# Patient Record
Sex: FEMALE | Race: WHITE | NOT HISPANIC OR LATINO | Employment: OTHER | URBAN - METROPOLITAN AREA
[De-identification: names, ages, dates, MRNs, and addresses within clinical notes are randomized per-mention and may not be internally consistent; named-entity substitution may affect disease eponyms.]

---

## 2017-08-02 ENCOUNTER — APPOINTMENT (EMERGENCY)
Dept: NON INVASIVE DIAGNOSTICS | Facility: HOSPITAL | Age: 72
DRG: 085 | End: 2017-08-02
Payer: COMMERCIAL

## 2017-08-02 ENCOUNTER — APPOINTMENT (EMERGENCY)
Dept: RADIOLOGY | Facility: HOSPITAL | Age: 72
DRG: 085 | End: 2017-08-02
Payer: COMMERCIAL

## 2017-08-02 ENCOUNTER — HOSPITAL ENCOUNTER (EMERGENCY)
Facility: HOSPITAL | Age: 72
DRG: 064 | End: 2017-08-02
Payer: COMMERCIAL

## 2017-08-02 ENCOUNTER — HOSPITAL ENCOUNTER (EMERGENCY)
Facility: HOSPITAL | Age: 72
Discharge: LEFT BEFORE DISCHARGE | DRG: 085 | End: 2017-08-02
Payer: COMMERCIAL

## 2017-08-02 ENCOUNTER — APPOINTMENT (EMERGENCY)
Dept: RADIOLOGY | Facility: HOSPITAL | Age: 72
DRG: 064 | End: 2017-08-02
Payer: COMMERCIAL

## 2017-08-02 VITALS
RESPIRATION RATE: 18 BRPM | OXYGEN SATURATION: 96 % | DIASTOLIC BLOOD PRESSURE: 75 MMHG | WEIGHT: 140 LBS | HEART RATE: 75 BPM | SYSTOLIC BLOOD PRESSURE: 140 MMHG | BODY MASS INDEX: 24.03 KG/M2 | TEMPERATURE: 98.2 F

## 2017-08-02 VITALS
BODY MASS INDEX: 23.9 KG/M2 | DIASTOLIC BLOOD PRESSURE: 78 MMHG | RESPIRATION RATE: 21 BRPM | WEIGHT: 140 LBS | HEART RATE: 87 BPM | SYSTOLIC BLOOD PRESSURE: 135 MMHG | TEMPERATURE: 98 F | HEIGHT: 64 IN | OXYGEN SATURATION: 99 %

## 2017-08-02 DIAGNOSIS — S14.109A INJURY OF CERVICAL SPINE, INITIAL ENCOUNTER (HCC): ICD-10-CM
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
## 2017-08-02 DIAGNOSIS — R53.1 LEFT-SIDED WEAKNESS: Primary | ICD-10-CM

## 2017-08-02 DIAGNOSIS — R29.90 STROKE-LIKE SYMPTOMS: Primary | ICD-10-CM

## 2017-08-02 DIAGNOSIS — S09.90XA HEAD INJURY, INITIAL ENCOUNTER: ICD-10-CM

## 2017-08-02 LAB
ANION GAP BLD CALC-SCNC: 15 MMOL/L (ref 4–13)
ANION GAP SERPL CALCULATED.3IONS-SCNC: 11 MMOL/L (ref 4–13)
APTT PPP: 28 SECONDS (ref 23–35)
BASE EXCESS BLDA CALC-SCNC: 2 MMOL/L (ref -2–3)
BUN BLD-MCNC: 10 MG/DL (ref 5–25)
BUN SERPL-MCNC: 12 MG/DL (ref 5–25)
CA-I BLD-SCNC: 1.14 MMOL/L (ref 1.12–1.32)
CA-I BLD-SCNC: 1.21 MMOL/L (ref 1.12–1.32)
CALCIUM SERPL-MCNC: 9.3 MG/DL (ref 8.3–10.1)
CHLORIDE BLD-SCNC: 102 MMOL/L (ref 100–108)
CHLORIDE SERPL-SCNC: 103 MMOL/L (ref 100–108)
CO2 SERPL-SCNC: 25 MMOL/L (ref 21–32)
CREAT BLD-MCNC: 0.5 MG/DL (ref 0.6–1.3)
CREAT SERPL-MCNC: 0.64 MG/DL (ref 0.6–1.3)
ERYTHROCYTE [DISTWIDTH] IN BLOOD BY AUTOMATED COUNT: 14 % (ref 11.6–15.1)
GFR SERPL CREATININE-BSD FRML MDRD: 89 ML/MIN/1.73SQ M
GFR SERPL CREATININE-BSD FRML MDRD: 96 ML/MIN/1.73SQ M
GLUCOSE SERPL-MCNC: 91 MG/DL (ref 65–140)
GLUCOSE SERPL-MCNC: 94 MG/DL (ref 65–140)
GLUCOSE SERPL-MCNC: 97 MG/DL (ref 65–140)
HCO3 BLDA-SCNC: 25.2 MMOL/L (ref 24–30)
HCT VFR BLD AUTO: 41.5 % (ref 34.8–46.1)
HCT VFR BLD CALC: 40 % (ref 34.8–46.1)
HCT VFR BLD CALC: 42 % (ref 34.8–46.1)
HGB BLD-MCNC: 14 G/DL (ref 11.5–15.4)
HGB BLDA-MCNC: 13.6 G/DL (ref 11.5–15.4)
HGB BLDA-MCNC: 14.3 G/DL (ref 11.5–15.4)
HOLD SPECIMEN: NORMAL
INR PPP: 0.97 (ref 0.86–1.16)
MCH RBC QN AUTO: 30.4 PG (ref 26.8–34.3)
MCHC RBC AUTO-ENTMCNC: 33.7 G/DL (ref 31.4–37.4)
MCV RBC AUTO: 90 FL (ref 82–98)
PCO2 BLD: 25 MMOL/L (ref 21–32)
PCO2 BLD: 26 MMOL/L (ref 21–32)
PCO2 BLD: 35.4 MM HG (ref 42–50)
PH BLD: 7.46 [PH] (ref 7.3–7.4)
PLATELET # BLD AUTO: 273 THOUSANDS/UL (ref 149–390)
PMV BLD AUTO: 9.1 FL (ref 8.9–12.7)
PO2 BLD: 50 MM HG (ref 35–45)
POTASSIUM BLD-SCNC: 3.5 MMOL/L (ref 3.5–5.3)
POTASSIUM BLD-SCNC: 3.5 MMOL/L (ref 3.5–5.3)
POTASSIUM SERPL-SCNC: 3.6 MMOL/L (ref 3.5–5.3)
PROTHROMBIN TIME: 12.7 SECONDS (ref 12.1–14.4)
RBC # BLD AUTO: 4.6 MILLION/UL (ref 3.81–5.12)
SAO2 % BLD FROM PO2: 87 % (ref 95–98)
SODIUM BLD-SCNC: 135 MMOL/L (ref 136–145)
SODIUM BLD-SCNC: 137 MMOL/L (ref 136–145)
SODIUM SERPL-SCNC: 139 MMOL/L (ref 136–145)
SPECIMEN SOURCE: ABNORMAL
SPECIMEN SOURCE: ABNORMAL
TROPONIN I SERPL-MCNC: 0.16 NG/ML
WBC # BLD AUTO: 8.77 THOUSAND/UL (ref 4.31–10.16)

## 2017-08-02 PROCEDURE — 72128 CT CHEST SPINE W/O DYE: CPT

## 2017-08-02 PROCEDURE — 86850 RBC ANTIBODY SCREEN: CPT | Performed by: EMERGENCY MEDICINE

## 2017-08-02 PROCEDURE — 82330 ASSAY OF CALCIUM: CPT

## 2017-08-02 PROCEDURE — 84484 ASSAY OF TROPONIN QUANT: CPT | Performed by: EMERGENCY MEDICINE

## 2017-08-02 PROCEDURE — 80047 BASIC METABLC PNL IONIZED CA: CPT

## 2017-08-02 PROCEDURE — 93005 ELECTROCARDIOGRAM TRACING: CPT | Performed by: EMERGENCY MEDICINE

## 2017-08-02 PROCEDURE — 84295 ASSAY OF SERUM SODIUM: CPT

## 2017-08-02 PROCEDURE — 82947 ASSAY GLUCOSE BLOOD QUANT: CPT

## 2017-08-02 PROCEDURE — 85610 PROTHROMBIN TIME: CPT | Performed by: EMERGENCY MEDICINE

## 2017-08-02 PROCEDURE — 86901 BLOOD TYPING SEROLOGIC RH(D): CPT | Performed by: EMERGENCY MEDICINE

## 2017-08-02 PROCEDURE — 99285 EMERGENCY DEPT VISIT HI MDM: CPT

## 2017-08-02 PROCEDURE — 93005 ELECTROCARDIOGRAM TRACING: CPT | Performed by: PHYSICIAN ASSISTANT

## 2017-08-02 PROCEDURE — 80048 BASIC METABOLIC PNL TOTAL CA: CPT | Performed by: EMERGENCY MEDICINE

## 2017-08-02 PROCEDURE — 82803 BLOOD GASES ANY COMBINATION: CPT

## 2017-08-02 PROCEDURE — 71010 HB CHEST X-RAY 1 VIEW FRONTAL (PORTABLE): CPT

## 2017-08-02 PROCEDURE — 36415 COLL VENOUS BLD VENIPUNCTURE: CPT | Performed by: EMERGENCY MEDICINE

## 2017-08-02 PROCEDURE — 85014 HEMATOCRIT: CPT

## 2017-08-02 PROCEDURE — 72131 CT LUMBAR SPINE W/O DYE: CPT

## 2017-08-02 PROCEDURE — 85027 COMPLETE CBC AUTOMATED: CPT | Performed by: EMERGENCY MEDICINE

## 2017-08-02 PROCEDURE — 85730 THROMBOPLASTIN TIME PARTIAL: CPT | Performed by: EMERGENCY MEDICINE

## 2017-08-02 PROCEDURE — 70450 CT HEAD/BRAIN W/O DYE: CPT

## 2017-08-02 PROCEDURE — 72125 CT NECK SPINE W/O DYE: CPT

## 2017-08-02 PROCEDURE — 84132 ASSAY OF SERUM POTASSIUM: CPT

## 2017-08-02 PROCEDURE — 86900 BLOOD TYPING SEROLOGIC ABO: CPT | Performed by: EMERGENCY MEDICINE

## 2017-08-02 RX ORDER — BACLOFEN 20 MG
TABLET ORAL
Status: ON HOLD | COMMUNITY
Start: 2015-07-06 | End: 2017-08-04 | Stop reason: CLARIF

## 2017-08-02 RX ORDER — DIPHENOXYLATE HYDROCHLORIDE AND ATROPINE SULFATE 2.5; .025 MG/1; MG/1
1 TABLET ORAL DAILY
Status: ON HOLD | COMMUNITY
End: 2017-08-23 | Stop reason: CLARIF
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
## 2017-08-02 NOTE — TRAUMA DOCUMENTATION
Attempted to take patient to CT Scan  When power went out CT Scan never came back up  Provider at bedside
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
## 2017-08-03 LAB
ABO GROUP BLD: NORMAL
ATRIAL RATE: 76 BPM
BLD GP AB SCN SERPL QL: NEGATIVE
P AXIS: 73 DEGREES
PR INTERVAL: 176 MS
QRS AXIS: 16 DEGREES
QRSD INTERVAL: 72 MS
QT INTERVAL: 374 MS
QTC INTERVAL: 420 MS
RH BLD: NEGATIVE
SPECIMEN EXPIRATION DATE: NORMAL
T WAVE AXIS: 68 DEGREES
VENTRICULAR RATE: 76 BPM

## 2017-08-04 ENCOUNTER — APPOINTMENT (EMERGENCY)
Dept: CT IMAGING | Facility: HOSPITAL | Age: 72
DRG: 064 | End: 2017-08-04
Payer: COMMERCIAL

## 2017-08-04 ENCOUNTER — HOSPITAL ENCOUNTER (INPATIENT)
Facility: HOSPITAL | Age: 72
LOS: 6 days | DRG: 085 | End: 2017-08-10
Attending: SURGERY
Payer: COMMERCIAL

## 2017-08-04 ENCOUNTER — APPOINTMENT (INPATIENT)
Dept: MRI IMAGING | Facility: HOSPITAL | Age: 72
DRG: 064 | End: 2017-08-04
Payer: COMMERCIAL

## 2017-08-04 ENCOUNTER — HOSPITAL ENCOUNTER (INPATIENT)
Facility: HOSPITAL | Age: 72
LOS: 1 days | DRG: 064 | End: 2017-08-04
Attending: EMERGENCY MEDICINE | Admitting: INTERNAL MEDICINE
Payer: COMMERCIAL

## 2017-08-04 VITALS
WEIGHT: 136.69 LBS | HEART RATE: 81 BPM | DIASTOLIC BLOOD PRESSURE: 74 MMHG | SYSTOLIC BLOOD PRESSURE: 120 MMHG | HEIGHT: 67 IN | TEMPERATURE: 97.4 F | BODY MASS INDEX: 21.45 KG/M2 | OXYGEN SATURATION: 93 % | RESPIRATION RATE: 18 BRPM

## 2017-08-04 DIAGNOSIS — F32.A DEPRESSION: ICD-10-CM

## 2017-08-04 DIAGNOSIS — R29.898 WEAKNESS OF EXTREMITY: ICD-10-CM

## 2017-08-04 DIAGNOSIS — R47.1 DYSARTHRIA: ICD-10-CM

## 2017-08-04 DIAGNOSIS — I60.9 SAH (SUBARACHNOID HEMORRHAGE) (HCC): ICD-10-CM

## 2017-08-04 DIAGNOSIS — I63.9 STROKE (HCC): Primary | ICD-10-CM

## 2017-08-04 DIAGNOSIS — R53.1 LEFT-SIDED WEAKNESS: ICD-10-CM

## 2017-08-04 PROBLEM — E87.1 HYPONATREMIA: Status: ACTIVE | Noted: 2017-08-04

## 2017-08-04 LAB
ANION GAP SERPL CALCULATED.3IONS-SCNC: 8 MMOL/L (ref 4–13)
ATRIAL RATE: 69 BPM
ATRIAL RATE: 93 BPM
BASOPHILS # BLD AUTO: 0.03 THOUSANDS/ΜL (ref 0–0.1)
BASOPHILS NFR BLD AUTO: 0 % (ref 0–1)
BUN SERPL-MCNC: 13 MG/DL (ref 5–25)
CALCIUM SERPL-MCNC: 9 MG/DL (ref 8.3–10.1)
CHLORIDE SERPL-SCNC: 97 MMOL/L (ref 100–108)
CO2 SERPL-SCNC: 27 MMOL/L (ref 21–32)
CREAT SERPL-MCNC: 0.61 MG/DL (ref 0.6–1.3)
EOSINOPHIL # BLD AUTO: 0.11 THOUSAND/ΜL (ref 0–0.61)
EOSINOPHIL NFR BLD AUTO: 1 % (ref 0–6)
ERYTHROCYTE [DISTWIDTH] IN BLOOD BY AUTOMATED COUNT: 13.5 % (ref 11.6–15.1)
GFR SERPL CREATININE-BSD FRML MDRD: 90 ML/MIN/1.73SQ M
GLUCOSE SERPL-MCNC: 94 MG/DL (ref 65–140)
HCT VFR BLD AUTO: 40.6 % (ref 34.8–46.1)
HGB BLD-MCNC: 13.8 G/DL (ref 11.5–15.4)
LYMPHOCYTES # BLD AUTO: 1.43 THOUSANDS/ΜL (ref 0.6–4.47)
LYMPHOCYTES NFR BLD AUTO: 18 % (ref 14–44)
MCH RBC QN AUTO: 30.3 PG (ref 26.8–34.3)
MCHC RBC AUTO-ENTMCNC: 34 G/DL (ref 31.4–37.4)
MCV RBC AUTO: 89 FL (ref 82–98)
MONOCYTES # BLD AUTO: 0.82 THOUSAND/ΜL (ref 0.17–1.22)
MONOCYTES NFR BLD AUTO: 10 % (ref 4–12)
NEUTROPHILS # BLD AUTO: 5.67 THOUSANDS/ΜL (ref 1.85–7.62)
NEUTS SEG NFR BLD AUTO: 71 % (ref 43–75)
P AXIS: 81 DEGREES
P AXIS: 84 DEGREES
PLATELET # BLD AUTO: 264 THOUSANDS/UL (ref 149–390)
PMV BLD AUTO: 8.8 FL (ref 8.9–12.7)
POTASSIUM SERPL-SCNC: 4.6 MMOL/L (ref 3.5–5.3)
PR INTERVAL: 154 MS
PR INTERVAL: 168 MS
QRS AXIS: -25 DEGREES
QRS AXIS: -45 DEGREES
QRSD INTERVAL: 70 MS
QRSD INTERVAL: 70 MS
QT INTERVAL: 364 MS
QT INTERVAL: 412 MS
QTC INTERVAL: 441 MS
QTC INTERVAL: 452 MS
RBC # BLD AUTO: 4.55 MILLION/UL (ref 3.81–5.12)
SODIUM SERPL-SCNC: 132 MMOL/L (ref 136–145)
T WAVE AXIS: 73 DEGREES
T WAVE AXIS: 87 DEGREES
TROPONIN I SERPL-MCNC: 0.02 NG/ML
VENTRICULAR RATE: 69 BPM
VENTRICULAR RATE: 93 BPM
WBC # BLD AUTO: 8.06 THOUSAND/UL (ref 4.31–10.16)

## 2017-08-04 PROCEDURE — 74177 CT ABD & PELVIS W/CONTRAST: CPT

## 2017-08-04 PROCEDURE — 71260 CT THORAX DX C+: CPT

## 2017-08-04 PROCEDURE — 84484 ASSAY OF TROPONIN QUANT: CPT | Performed by: EMERGENCY MEDICINE

## 2017-08-04 PROCEDURE — 99285 EMERGENCY DEPT VISIT HI MDM: CPT

## 2017-08-04 PROCEDURE — 93005 ELECTROCARDIOGRAM TRACING: CPT | Performed by: EMERGENCY MEDICINE

## 2017-08-04 PROCEDURE — 85025 COMPLETE CBC W/AUTO DIFF WBC: CPT | Performed by: EMERGENCY MEDICINE

## 2017-08-04 PROCEDURE — 70551 MRI BRAIN STEM W/O DYE: CPT

## 2017-08-04 PROCEDURE — 36415 COLL VENOUS BLD VENIPUNCTURE: CPT | Performed by: EMERGENCY MEDICINE

## 2017-08-04 PROCEDURE — 80048 BASIC METABOLIC PNL TOTAL CA: CPT | Performed by: EMERGENCY MEDICINE

## 2017-08-04 RX ORDER — SODIUM CHLORIDE 9 MG/ML
75 INJECTION, SOLUTION INTRAVENOUS CONTINUOUS
Status: CANCELLED | OUTPATIENT
Start: 2017-08-04

## 2017-08-04 RX ORDER — SODIUM CHLORIDE, SODIUM GLUCONATE, SODIUM ACETATE, POTASSIUM CHLORIDE, MAGNESIUM CHLORIDE, SODIUM PHOSPHATE, DIBASIC, AND POTASSIUM PHOSPHATE .53; .5; .37; .037; .03; .012; .00082 G/100ML; G/100ML; G/100ML; G/100ML; G/100ML; G/100ML; G/100ML
75 INJECTION, SOLUTION INTRAVENOUS CONTINUOUS
Status: DISCONTINUED | OUTPATIENT
Start: 2017-08-04 | End: 2017-08-05

## 2017-08-04 RX ORDER — MULTIVIT-MIN/IRON FUM/FOLIC AC 7.5 MG-4
1 TABLET ORAL DAILY
Status: ON HOLD | COMMUNITY
End: 2017-08-23 | Stop reason: CLARIF

## 2017-08-04 RX ORDER — METHYLDOPA 250 MG
1 TABLET ORAL DAILY
Status: ON HOLD | COMMUNITY
End: 2017-08-23 | Stop reason: CLARIF

## 2017-08-04 RX ORDER — PSEUDOEPHEDRINE HYDROCHLORIDE 30 MG/1
30 TABLET ORAL DAILY
Status: ON HOLD | COMMUNITY
End: 2017-08-23 | Stop reason: CLARIF

## 2017-08-04 RX ORDER — VITAMIN E 268 MG
400 CAPSULE ORAL DAILY
Status: ON HOLD | COMMUNITY
End: 2017-08-23 | Stop reason: CLARIF

## 2017-08-04 RX ORDER — SODIUM CHLORIDE 9 MG/ML
75 INJECTION, SOLUTION INTRAVENOUS CONTINUOUS
Status: DISCONTINUED | OUTPATIENT
Start: 2017-08-04 | End: 2017-08-04 | Stop reason: HOSPADM

## 2017-08-04 RX ORDER — ACETAMINOPHEN 325 MG/1
650 TABLET ORAL EVERY 6 HOURS PRN
Status: CANCELLED | OUTPATIENT
Start: 2017-08-04

## 2017-08-04 RX ORDER — ONDANSETRON 2 MG/ML
4 INJECTION INTRAMUSCULAR; INTRAVENOUS EVERY 6 HOURS PRN
Status: CANCELLED | OUTPATIENT
Start: 2017-08-04

## 2017-08-04 RX ORDER — IBUPROFEN 200 MG
1 CAPSULE ORAL DAILY
Status: ON HOLD | COMMUNITY
End: 2017-08-23 | Stop reason: CLARIF

## 2017-08-04 RX ORDER — HEPARIN SODIUM 5000 [USP'U]/ML
5000 INJECTION, SOLUTION INTRAVENOUS; SUBCUTANEOUS EVERY 8 HOURS SCHEDULED
Status: DISCONTINUED | OUTPATIENT
Start: 2017-08-04 | End: 2017-08-04

## 2017-08-04 RX ORDER — ONDANSETRON 2 MG/ML
4 INJECTION INTRAMUSCULAR; INTRAVENOUS EVERY 6 HOURS PRN
Status: DISCONTINUED | OUTPATIENT
Start: 2017-08-04 | End: 2017-08-04 | Stop reason: HOSPADM

## 2017-08-04 RX ORDER — NICOTINE 21 MG/24HR
1 PATCH, TRANSDERMAL 24 HOURS TRANSDERMAL DAILY
Status: CANCELLED | OUTPATIENT
Start: 2017-08-05

## 2017-08-04 RX ORDER — ASPIRIN 325 MG
325 TABLET ORAL DAILY
Status: DISCONTINUED | OUTPATIENT
Start: 2017-08-04 | End: 2017-08-04

## 2017-08-04 RX ORDER — ACETAMINOPHEN 325 MG/1
650 TABLET ORAL EVERY 6 HOURS PRN
Status: DISCONTINUED | OUTPATIENT
Start: 2017-08-04 | End: 2017-08-04 | Stop reason: HOSPADM

## 2017-08-04 RX ORDER — NICOTINE 21 MG/24HR
1 PATCH, TRANSDERMAL 24 HOURS TRANSDERMAL DAILY
Status: DISCONTINUED | OUTPATIENT
Start: 2017-08-05 | End: 2017-08-04 | Stop reason: HOSPADM

## 2017-08-04 RX ORDER — ATORVASTATIN CALCIUM 40 MG/1
40 TABLET, FILM COATED ORAL EVERY EVENING
Status: DISCONTINUED | OUTPATIENT
Start: 2017-08-04 | End: 2017-08-04

## 2017-08-04 RX ORDER — MORPHINE SULFATE 2 MG/ML
1 INJECTION, SOLUTION INTRAMUSCULAR; INTRAVENOUS EVERY 4 HOURS PRN
Status: DISCONTINUED | OUTPATIENT
Start: 2017-08-04 | End: 2017-08-07

## 2017-08-04 RX ORDER — ARGININE 500 MG
500 TABLET ORAL DAILY
Status: ON HOLD | COMMUNITY
End: 2017-08-23 | Stop reason: CLARIF

## 2017-08-04 RX ADMIN — IOHEXOL 85 ML: 350 INJECTION, SOLUTION INTRAVENOUS at 14:24

## 2017-08-04 RX ADMIN — MORPHINE SULFATE 1 MG: 2 INJECTION, SOLUTION INTRAMUSCULAR; INTRAVENOUS at 22:19

## 2017-08-04 NOTE — ED PROVIDER NOTES
History  Chief Complaint   Patient presents with    Extremity Weakness     Patient was seen here on 8/2 after falling  Pt was transfered to [facility]  Son brought her back in today because she has weakness on the L side, slurred speech, and decreasd appetite       History provided by:  Patient   used: No    Extremity Weakness   Associated symptoms: no abdominal pain, no chest pain, no congestion, no cough, no diarrhea, no fever, no headaches, no nausea, no rash, no shortness of breath, no sore throat, no vomiting and no wheezing      Pt is a 66 y/o female presenting to ED with L sided weakness, unable to ambulate  Pt was evaluated at [facility] 2 days ago for same complaints, work up was concerning for stroke, pt left ama  Returns today because she is unable to ambulate  Denies cp, sob, back pain  Does complaint of right sided pain over the back  No new headache, dizziness, lightheadedness  No new trauma  Not on blood thinners  mdm stroke from 4 days ago, will check for traumatic findings in c/a/p with ct scan  Admit patient for stroke workup in the hospital       Prior to Admission Medications   Prescriptions Last Dose Informant Patient Reported? Taking?    Coenzyme Q10-Levocarnitine  MG CAPS   Yes No   Sig: Take by mouth   Multiple Vitamins-Minerals (MULTIVITAMIN WITH MINERALS) tablet   Yes Yes   Sig: Take 1 tablet by mouth daily   Phosphatidylserine 100 MG CAPS   Yes No   Sig: Take by mouth   calcium citrate (CALCITRATE) 950 MG tablet   Yes Yes   Sig: Take 1 tablet by mouth daily   co-enzyme Q-10 30 MG capsule   Yes Yes   Sig: Take 30 mg by mouth 3 (three) times a day   cyanocobalamin 500 MCG tablet   Yes Yes   Sig: Take 500 mcg by mouth daily   multivitamin (THERAGRAN) TABS  Self Yes No   Sig: Take 1 tablet by mouth daily   vitamin E, tocopherol, 400 units capsule   Yes Yes   Sig: Take 400 Units by mouth daily      Facility-Administered Medications: None History reviewed  No pertinent past medical history  Past Surgical History:   Procedure Laterality Date    ABDOMINAL SURGERY      CLOSED REDUCTION DISTAL FEMUR FRACTURE      HYSTERECTOMY         History reviewed  No pertinent family history  I have reviewed and agree with the history as documented  Social History   Substance Use Topics    Smoking status: Current Every Day Smoker     Packs/day: 1 00     Types: Cigarettes    Smokeless tobacco: Not on file    Alcohol use No        Review of Systems   Constitutional: Negative for chills, diaphoresis and fever  HENT: Negative for congestion and sore throat  Eyes: Negative for photophobia and visual disturbance  Respiratory: Negative for cough, shortness of breath, wheezing and stridor  Cardiovascular: Negative for chest pain, palpitations and leg swelling  Gastrointestinal: Negative for abdominal pain, blood in stool, diarrhea, nausea and vomiting  Genitourinary: Negative for dysuria, frequency and urgency  Musculoskeletal: Positive for extremity weakness  Negative for neck pain and neck stiffness  Skin: Negative for pallor and rash  Neurological: Positive for weakness  Negative for dizziness, syncope, light-headedness and headaches  All other systems reviewed and are negative  Physical Exam  ED Triage Vitals   Temperature Pulse Respirations Blood Pressure SpO2   08/04/17 1156 08/04/17 1156 08/04/17 1156 08/04/17 1156 08/04/17 1156   (!) 97 4 °F (36 3 °C) 78 20 129/72 95 %      Temp Source Heart Rate Source Patient Position BP Location FiO2 (%)   08/04/17 1617 08/04/17 1300 08/04/17 1300 08/04/17 1300 --   Oral Monitor Lying Right arm       Pain Score       08/04/17 1156       4           Physical Exam   Constitutional: She is oriented to person, place, and time  She appears well-developed and well-nourished  HENT:   Head: Normocephalic and atraumatic  Eyes: EOM are normal  Pupils are equal, round, and reactive to light  Neck: Normal range of motion  Neck supple  Cardiovascular: Normal rate, regular rhythm, normal heart sounds and intact distal pulses  Pulmonary/Chest: Effort normal and breath sounds normal  No respiratory distress  Abdominal: Soft  Bowel sounds are normal  There is no tenderness  Musculoskeletal: Normal range of motion  She exhibits no edema or tenderness  Neurological: She is alert and oriented to person, place, and time  LUE and LLE weakness, slurred speech, sensation intact   Skin: Skin is warm and dry  Capillary refill takes less than 2 seconds  No rash noted  No erythema  Vitals reviewed        ED Medications  Medications   iohexol (OMNIPAQUE) 350 MG/ML injection (MULTI-DOSE) 85 mL (85 mL Intravenous Given 8/4/17 1424)       Diagnostic Studies  Labs Reviewed   CBC AND DIFFERENTIAL - Abnormal        Result Value Ref Range Status    MPV 8 8 (*) 8 9 - 12 7 fL Final    WBC 8 06  4 31 - 10 16 Thousand/uL Final    RBC 4 55  3 81 - 5 12 Million/uL Final    Hemoglobin 13 8  11 5 - 15 4 g/dL Final    Hematocrit 40 6  34 8 - 46 1 % Final    MCV 89  82 - 98 fL Final    MCH 30 3  26 8 - 34 3 pg Final    MCHC 34 0  31 4 - 37 4 g/dL Final    RDW 13 5  11 6 - 15 1 % Final    Platelets 489  766 - 390 Thousands/uL Final    Neutrophils Relative 71  43 - 75 % Final    Lymphocytes Relative 18  14 - 44 % Final    Monocytes Relative 10  4 - 12 % Final    Eosinophils Relative 1  0 - 6 % Final    Basophils Relative 0  0 - 1 % Final    Neutrophils Absolute 5 67  1 85 - 7 62 Thousands/µL Final    Lymphocytes Absolute 1 43  0 60 - 4 47 Thousands/µL Final    Monocytes Absolute 0 82  0 17 - 1 22 Thousand/µL Final    Eosinophils Absolute 0 11  0 00 - 0 61 Thousand/µL Final    Basophils Absolute 0 03  0 00 - 0 10 Thousands/µL Final   BASIC METABOLIC PANEL - Abnormal     Sodium 132 (*) 136 - 145 mmol/L Final    Chloride 97 (*) 100 - 108 mmol/L Final    Potassium 4 6  3 5 - 5 3 mmol/L Final    CO2 27  21 - 32 mmol/L Final Anion Gap 8  4 - 13 mmol/L Final    BUN 13  5 - 25 mg/dL Final    Creatinine 0 61  0 60 - 1 30 mg/dL Final    Comment: Standardized to IDMS reference method    Glucose 94  65 - 140 mg/dL Final    Comment: If the patient is fasting, the ADA then defines impaired fasting glucose as > 100 mg/dL and diabetes as > or equal to 123 mg/dL  Calcium 9 0  8 3 - 10 1 mg/dL Final    eGFR 90  ml/min/1 73sq m Final    Narrative:     National Kidney Disease Education Program recommendations are as follows:  GFR calculation is accurate only with a steady state creatinine  Chronic Kidney disease less than 60 ml/min/1 73 sq  meters  Kidney failure less than 15 ml/min/1 73 sq  meters  TROPONIN I - Normal    Troponin I 0 02  <=0 04 ng/mL Final    Comment: 3Autovalidation override    Narrative:     [employer] Chemistry analyzer 99% cutoff is > 0 04 ng/mL in network labs    o cTnI 99% cutoff is useful only when applied to patients in the clinical setting of myocardial ischemia  o cTnI 99% cutoff should be interpreted in the context of clinical history, ECG findings and possibly cardiac imaging to establish correct diagnosis  o cTnI 99% cutoff may be suggestive but clearly not indicative of a coronary event without the clinical setting of myocardial ischemia  MRI brain wo contrast   Final Result   1  Small amount of subarachnoid hemorrhage along the right parietal convexity, new from the prior study  2   Acute lacunar infarction right basal ganglia, extending superiorly into the periventricular white matter of the right parietal lobe  3   Cerebral atrophy with chronic small vessel ischemic change  4   Areas of blooming artifact, most compatible with hemosiderin deposition within small vascular malformations such as developmental venous anomalies        ##phoslh##phoslh         ##cfslh   I personally discussed this result with Dr Elias Mcclelland on 8/4/2017 6:48 PM    ##         Workstation performed: [id number]         CT chest abdomen pelvis w contrast   Final Result      No acute traumatic injuries are seen  Bullous emphysema with tiny lung nodule  One-year follow-up recommended  Renal cysts  Hysterectomy  Scattered small hepatic hypodensities which are too small to characterize but are likely cysts  Small hiatal hernia         Workstation performed: [id number]         VAS carotid complete study    (Results Pending)       Procedures  Procedures      Phone Contacts  ED Phone Contact    ED Course  ED Course   Haven Amaral's Documentation   Comment Time   Ecg shows normal sinus, no signifcant st or t wave changes  Left axis deviation  Normal qrs  Normal rate 08/04 1357             NIH Stroke Scale    Flowsheet Row Most Recent Value   Level of Consciousness (1a )  0 Filed at: 08/04/2017 1900   LOC Questions (1b )  0 Filed at: 08/04/2017 1900   LOC Commands (1c )  0 Filed at: 08/04/2017 1900   Best Gaze (2 )  0 [right eye chronic abnormal gaze] Filed at: 08/04/2017 1900   Visual (3 )  0 Filed at: 08/04/2017 1900   Facial Palsy (4 )  1 [right eyelid chronic paralysis] Filed at: 08/04/2017 1900   Motor Arm, Left (5a )  2 Filed at: 08/04/2017 1900   Motor Arm, Right (5b )  0 Filed at: 08/04/2017 1900   Motor Leg, Left (6a )  2 Filed at: 08/04/2017 1900   Motor Leg, Right (6b )  0 Filed at: 08/04/2017 1900   Limb Ataxia (7 )  1 Filed at: 08/04/2017 1900   Sensory (8 )  0 Filed at: 08/04/2017 1900   Best Language (9 )  1 Filed at: 08/04/2017 1900   Dysarthria (10 )  1 Filed at: 08/04/2017 1900   Extinction and Inattention (11 ) (Formerly Neglect)  0 Filed at: 08/04/2017 1900   Total  8 Filed at: 08/04/2017 1900                        [facility]  CritCare Time    Disposition  Final diagnoses:   Stroke     ED Disposition     ED Disposition Condition Comment    Admit  Case was discussed with RIAZ and the patient's admission status was agreed to be Admission Status: inpatient status to the service of Dr Sandhya Thacker MD Documentation    Flowsheet Row Most Recent Value   Patient Condition  The patient has been stabilized such that within reasonable medical probability, no material deterioration of the patient condition or the condition of the unborn child(woody) is likely to result from the transfer   Reason for Transfer  Level of Care needed not available at this facility   Benefits of Transfer  Specialized equipment and/or services available at the receiving facility (Include comment)________________________   Risks of Transfer  Potential for delay in receiving treatment, Potential deterioration of medical condition   Accepting Physician  Dr Saint Ali Name, [address] [facility]    (Name & Tel number)  PACS   Transported by (Company and Unit #)  [facility]   Sending MD [address]   Provider Certification  General risk, such as traffic hazards, adverse weather conditions, rough terrain or turbulence, possible failure of equipment (including vehicle or aircraft), or consequences of actions of persons outside the control of the transport personnel, Unanticipated needs of medical equipment and personnel during transport, Risk of worsening condition      RN Documentation    Flowsheet Row Most [facility] Name, [address] [facility]    (Name & Tel number)  PACS   Transported by (Company and Unit #)  [employer]      Follow-up Information    None       Discharge Medication List as of 8/4/2017  8:37 PM      CONTINUE these medications which have NOT CHANGED    Details   calcium citrate (CALCITRATE) 950 MG tablet Take 1 tablet by mouth daily, Historical Med      co-enzyme Q-10 30 MG capsule Take 30 mg by mouth 3 (three) times a day, Historical Med      Coenzyme Q10-Levocarnitine  MG CAPS Take by mouth, Starting Mon 7/6/2015, Historical Med      cyanocobalamin 500 MCG tablet Take 500 mcg by mouth daily, Historical Med      Multiple Vitamins-Minerals (MULTIVITAMIN WITH MINERALS) tablet Take 1 tablet by mouth daily, Historical Med      multivitamin (THERAGRAN) TABS Take 1 tablet by mouth daily, Historical Med      Phosphatidylserine 100 MG CAPS Take by mouth, Starting Mon 7/6/2015, Historical Med      vitamin E, tocopherol, 400 units capsule Take 400 Units by mouth daily, Historical Med           No discharge procedures on file      ED Provider  Electronically Signed by       Abdoul Smart MD  08/04/17 1292

## 2017-08-04 NOTE — PLAN OF CARE
Activity Intolerance/Impaired Mobility     Mobility/activity is maintained at optimum level for patient Progressing        Communication Impairment     Ability to express needs and understand communication Progressing        Neurological Deficit     Neurological status is stable or improving Progressing        Nutrition     Nutrition/Hydration status is improving Progressing        Potential for Aspiration     Non-ventilated patient's risk of aspiration is minimized Progressing        Potential for Falls     Patient will remain free of falls Progressing        Prexisting or High Potential for Compromised Skin Integrity     Skin integrity is maintained or improved Progressing

## 2017-08-04 NOTE — PROGRESS NOTES
Called by a radiologist regarding patient's MRI of brain which showed small amount of subarachnoid hemorrhage along the right parietal convexity and also acute lacunar infarction right basal ganglia extending superiorly to the periventricular white matter of the right parietal lobe  Discussed the findings with patient and family and they are agreeable to discussing with neurosurgeon and if needed possible transfer to [facility]  Spoke to Neurosurgery Dr Marta Franklin and Dr Messi Jerry and they accepted the patient to be transferred to ED where trauma will evaluate and subsequently admit the patient  Discussed with patient and family were agreeable to transfer for specialty care and for the management of subarachnoid hemorrhage  Her aspirin and heparin subcu were discontinued  Continue [facility] for DVT prophylaxis
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
## 2017-08-04 NOTE — EMTALA/ACUTE CARE TRANSFER
[facility] [address]  UNIT  [facility] [address] [address]  Dept: [phone]      ACUTE CARE TRANSFER CONSENT    NAME Paula Simons                                         1945                              MRN [id number]    I have been informed of my rights regarding examination, treatment, and transfer   by Dr Louann Scheuermann, MD    Benefits: Specialized equipment and/or services available at the receiving facility (Include comment)________________________ Neurosurgery    Risks: Potential for delay in receiving treatment, Potential deterioration of medical condition      Transfer Request:  I acknowledge that my medical condition has been evaluated and explained to me by the treating physician or other qualified medical person and/or my attending physician who has recommended and offered to me further medical examination and treatment  I understand the Hospital's obligation with respect to the treatment and stabilization of my medical condition  I nevertheless request to be transferred  I release the Hospital, the doctor, and any other persons caring for me from all responsibility or liability for any injury or ill effects that may result from my transfer and agree to accept all responsibility for the consequences of my choice to transfer, rather than receive stabilizing treatment at the Hospital  I understand that because the transfer is my request, my insurance may not provide reimbursement for the services  The Hospital will assist and direct me and my family in how to make arrangements for transfer, but the hospital is not liable for any fees charged by the transport service  In spite of this understanding, I refuse to consent to further medical examination and treatment which has been offered to me, and request transfer to  [facility] [address], [address] : [address]   I authorize the performance of emergency medical procedures and treatments upon me in both transit and upon arrival at the receiving facility  Additionally, I authorize the release of any and all medical records to the receiving facility and request they be transported with me, if possible  I authorize the performance of emergency medical procedures and treatments upon me in both transit and upon arrival at the receiving facility  Additionally, I authorize the release of any and all medical records to the receiving facility and request they be transported with me, if possible  I understand that the safest mode of transportation during a medical emergency is an ambulance and that the Hospital advocates the use of this mode of transport  Risks of traveling to the receiving facility by car, including absence of medical control, life sustaining equipment, such as oxygen, and medical personnel has been explained to me and I fully understand them  (JASMIN CORRECT BOX BELOW)  [  ]  I consent to the stated transfer and to be transported by ambulance/helicopter  [  ]  I consent to the stated transfer, but refuse transportation by ambulance and accept full responsibility for my transportation by car  I understand the risks of non-ambulance transfers and I exonerate the Hospital and its staff from any deterioration in my condition that results from this refusal     X___________________________________________    DATE  17  TIME________  Signature of patient or legally responsible individual signing on patient behalf           RELATIONSHIP TO PATIENT_________________________          Provider Certification    NAME Aung Gutierrez                                         1945                              MRN [id number]    A medical screening exam was performed on the above named patient    Based on the examination:    Condition Necessitating Transfer  Subarachnoid Hemorrhage    Patient Condition: The patient has been stabilized such that within reasonable medical probability, no material deterioration of the patient condition or the condition of the unborn child(woody) is likely to result from the transfer    Reason for Transfer: Level of Care needed not available at this facility    Transfer Requirements: [address]   · Space available and qualified personnel available for treatment as acknowledged by PACS  · Agreed to accept transfer and to provide appropriate medical treatment as acknowledged by       Dr Chastity Aguilar  · Appropriate medical records of the examination and treatment of the patient are provided at the time of transfer   [address], [address] _______  · Transfer will be performed by qualified personnel from [facility]  and appropriate transfer equipment as required, including the use of necessary and appropriate life support measures  Provider Certification: I have examined the patient and explained the following risks and benefits of being transferred/refusing transfer to the patient/family:  General risk, such as traffic hazards, adverse weather conditions, rough terrain or turbulence, possible failure of equipment (including vehicle or aircraft), or consequences of actions of persons outside the control of the transport personnel, Unanticipated needs of medical equipment and personnel during transport, Risk of worsening condition      Based on these reasonable risks and benefits to the patient and/or the unborn child(wodoy), and based upon the information available at the time of the patients examination, I certify that the medical benefits reasonably to be expected from the provision of appropriate medical treatments at another medical facility outweigh the increasing risks, if any, to the individuals medical condition, and in the case of labor to the unborn child, from effecting the transfer      X____________________________________________ DATE 08/04/17        TIME_______      ORIGINAL - SEND TO MEDICAL RECORDS   COPY - SEND WITH PATIENT DURING TRANSFER

## 2017-08-05 ENCOUNTER — APPOINTMENT (INPATIENT)
Dept: RADIOLOGY | Facility: HOSPITAL | Age: 72
DRG: 085 | End: 2017-08-05
Payer: COMMERCIAL

## 2017-08-05 PROBLEM — I63.81 LACUNAR INFARCT, ACUTE (HCC): Status: ACTIVE | Noted: 2017-08-05

## 2017-08-05 PROBLEM — I60.9 SAH (SUBARACHNOID HEMORRHAGE) (HCC): Status: ACTIVE | Noted: 2017-08-05

## 2017-08-05 PROBLEM — R53.1 LEFT-SIDED WEAKNESS: Status: ACTIVE | Noted: 2017-08-05

## 2017-08-05 PROBLEM — R47.1 DYSARTHRIA: Status: ACTIVE | Noted: 2017-08-05

## 2017-08-05 LAB
ALBUMIN SERPL BCP-MCNC: 3 G/DL (ref 3.5–5)
ALP SERPL-CCNC: 75 U/L (ref 46–116)
ALT SERPL W P-5'-P-CCNC: 25 U/L (ref 12–78)
ANION GAP SERPL CALCULATED.3IONS-SCNC: 8 MMOL/L (ref 4–13)
AST SERPL W P-5'-P-CCNC: 24 U/L (ref 5–45)
BILIRUB SERPL-MCNC: 0.64 MG/DL (ref 0.2–1)
BUN SERPL-MCNC: 11 MG/DL (ref 5–25)
CALCIUM SERPL-MCNC: 8.5 MG/DL (ref 8.3–10.1)
CHLORIDE SERPL-SCNC: 100 MMOL/L (ref 100–108)
CO2 SERPL-SCNC: 25 MMOL/L (ref 21–32)
CREAT SERPL-MCNC: 0.42 MG/DL (ref 0.6–1.3)
ERYTHROCYTE [DISTWIDTH] IN BLOOD BY AUTOMATED COUNT: 13.2 % (ref 11.6–15.1)
GFR SERPL CREATININE-BSD FRML MDRD: 102 ML/MIN/1.73SQ M
GLUCOSE SERPL-MCNC: 86 MG/DL (ref 65–140)
HCT VFR BLD AUTO: 38.3 % (ref 34.8–46.1)
HGB BLD-MCNC: 13.5 G/DL (ref 11.5–15.4)
INR PPP: 1.05 (ref 0.86–1.16)
MCH RBC QN AUTO: 31 PG (ref 26.8–34.3)
MCHC RBC AUTO-ENTMCNC: 35.2 G/DL (ref 31.4–37.4)
MCV RBC AUTO: 88 FL (ref 82–98)
PLATELET # BLD AUTO: 252 THOUSANDS/UL (ref 149–390)
PMV BLD AUTO: 9.7 FL (ref 8.9–12.7)
POTASSIUM SERPL-SCNC: 3.7 MMOL/L (ref 3.5–5.3)
PROT SERPL-MCNC: 6.3 G/DL (ref 6.4–8.2)
PROTHROMBIN TIME: 13.7 SECONDS (ref 12.1–14.4)
RBC # BLD AUTO: 4.35 MILLION/UL (ref 3.81–5.12)
SODIUM SERPL-SCNC: 133 MMOL/L (ref 136–145)
WBC # BLD AUTO: 9.35 THOUSAND/UL (ref 4.31–10.16)

## 2017-08-05 PROCEDURE — G8979 MOBILITY GOAL STATUS: HCPCS

## 2017-08-05 PROCEDURE — G8988 SELF CARE GOAL STATUS: HCPCS

## 2017-08-05 PROCEDURE — 80053 COMPREHEN METABOLIC PANEL: CPT | Performed by: EMERGENCY MEDICINE

## 2017-08-05 PROCEDURE — 85027 COMPLETE CBC AUTOMATED: CPT | Performed by: EMERGENCY MEDICINE

## 2017-08-05 PROCEDURE — G8978 MOBILITY CURRENT STATUS: HCPCS

## 2017-08-05 PROCEDURE — G8987 SELF CARE CURRENT STATUS: HCPCS

## 2017-08-05 PROCEDURE — 97163 PT EVAL HIGH COMPLEX 45 MIN: CPT

## 2017-08-05 PROCEDURE — 92610 EVALUATE SWALLOWING FUNCTION: CPT

## 2017-08-05 PROCEDURE — 85610 PROTHROMBIN TIME: CPT | Performed by: EMERGENCY MEDICINE

## 2017-08-05 PROCEDURE — 97167 OT EVAL HIGH COMPLEX 60 MIN: CPT

## 2017-08-05 PROCEDURE — 70450 CT HEAD/BRAIN W/O DYE: CPT

## 2017-08-05 RX ORDER — MORPHINE SULFATE 2 MG/ML
2 INJECTION, SOLUTION INTRAMUSCULAR; INTRAVENOUS EVERY 4 HOURS PRN
Status: DISCONTINUED | OUTPATIENT
Start: 2017-08-05 | End: 2017-08-07

## 2017-08-05 RX ORDER — SODIUM CHLORIDE 9 MG/ML
75 INJECTION, SOLUTION INTRAVENOUS CONTINUOUS
Status: DISCONTINUED | OUTPATIENT
Start: 2017-08-05 | End: 2017-08-05

## 2017-08-05 RX ORDER — MORPHINE SULFATE 2 MG/ML
2 INJECTION, SOLUTION INTRAMUSCULAR; INTRAVENOUS ONCE
Status: COMPLETED | OUTPATIENT
Start: 2017-08-05 | End: 2017-08-05

## 2017-08-05 RX ADMIN — MORPHINE SULFATE 2 MG: 2 INJECTION, SOLUTION INTRAMUSCULAR; INTRAVENOUS at 08:25

## 2017-08-05 RX ADMIN — SODIUM CHLORIDE, SODIUM GLUCONATE, SODIUM ACETATE, POTASSIUM CHLORIDE, MAGNESIUM CHLORIDE, SODIUM PHOSPHATE, DIBASIC, AND POTASSIUM PHOSPHATE 75 ML/HR: .53; .5; .37; .037; .03; .012; .00082 INJECTION, SOLUTION INTRAVENOUS at 01:20

## 2017-08-05 RX ADMIN — MORPHINE SULFATE 2 MG: 2 INJECTION, SOLUTION INTRAMUSCULAR; INTRAVENOUS at 00:55

## 2017-08-05 RX ADMIN — SODIUM CHLORIDE 75 ML/HR: 0.9 INJECTION, SOLUTION INTRAVENOUS at 08:26

## 2017-08-05 RX ADMIN — MORPHINE SULFATE 2 MG: 2 INJECTION, SOLUTION INTRAMUSCULAR; INTRAVENOUS at 19:03

## 2017-08-05 RX ADMIN — MORPHINE SULFATE 1 MG: 2 INJECTION, SOLUTION INTRAMUSCULAR; INTRAVENOUS at 04:22
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
## 2017-08-05 NOTE — PROGRESS NOTES
UA not obtained prior to transfer to [facility]  Report given to nurse David Oglesby at ext  [phone]
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
## 2017-08-06 RX ORDER — ATORVASTATIN CALCIUM 40 MG/1
40 TABLET, FILM COATED ORAL
Status: DISCONTINUED | OUTPATIENT
Start: 2017-08-06 | End: 2017-08-10 | Stop reason: HOSPADM

## 2017-08-06 RX ADMIN — ATORVASTATIN CALCIUM 40 MG: 40 TABLET, FILM COATED ORAL at 19:03

## 2017-08-06 RX ADMIN — MORPHINE SULFATE 2 MG: 2 INJECTION, SOLUTION INTRAMUSCULAR; INTRAVENOUS at 09:53

## 2017-08-06 RX ADMIN — MORPHINE SULFATE 2 MG: 2 INJECTION, SOLUTION INTRAMUSCULAR; INTRAVENOUS at 19:08

## 2017-08-06 RX ADMIN — MORPHINE SULFATE 1 MG: 2 INJECTION, SOLUTION INTRAMUSCULAR; INTRAVENOUS at 04:05

## 2017-08-07 ENCOUNTER — APPOINTMENT (INPATIENT)
Dept: NON INVASIVE DIAGNOSTICS | Facility: HOSPITAL | Age: 72
DRG: 085 | End: 2017-08-07
Payer: COMMERCIAL

## 2017-08-07 ENCOUNTER — APPOINTMENT (INPATIENT)
Dept: OCCUPATIONAL THERAPY | Facility: HOSPITAL | Age: 72
DRG: 085 | End: 2017-08-07
Payer: COMMERCIAL

## 2017-08-07 LAB
ANION GAP SERPL CALCULATED.3IONS-SCNC: 6 MMOL/L (ref 4–13)
BASOPHILS # BLD AUTO: 0.04 THOUSANDS/ΜL (ref 0–0.1)
BASOPHILS NFR BLD AUTO: 1 % (ref 0–1)
BUN SERPL-MCNC: 15 MG/DL (ref 5–25)
CALCIUM SERPL-MCNC: 8.6 MG/DL (ref 8.3–10.1)
CHLORIDE SERPL-SCNC: 106 MMOL/L (ref 100–108)
CO2 SERPL-SCNC: 26 MMOL/L (ref 21–32)
CREAT SERPL-MCNC: 0.57 MG/DL (ref 0.6–1.3)
EOSINOPHIL # BLD AUTO: 0.26 THOUSAND/ΜL (ref 0–0.61)
EOSINOPHIL NFR BLD AUTO: 3 % (ref 0–6)
ERYTHROCYTE [DISTWIDTH] IN BLOOD BY AUTOMATED COUNT: 13.9 % (ref 11.6–15.1)
GFR SERPL CREATININE-BSD FRML MDRD: 92 ML/MIN/1.73SQ M
GLUCOSE SERPL-MCNC: 79 MG/DL (ref 65–140)
HCT VFR BLD AUTO: 37.8 % (ref 34.8–46.1)
HGB BLD-MCNC: 13 G/DL (ref 11.5–15.4)
LYMPHOCYTES # BLD AUTO: 2.69 THOUSANDS/ΜL (ref 0.6–4.47)
LYMPHOCYTES NFR BLD AUTO: 31 % (ref 14–44)
MCH RBC QN AUTO: 30.9 PG (ref 26.8–34.3)
MCHC RBC AUTO-ENTMCNC: 34.4 G/DL (ref 31.4–37.4)
MCV RBC AUTO: 90 FL (ref 82–98)
MONOCYTES # BLD AUTO: 0.87 THOUSAND/ΜL (ref 0.17–1.22)
MONOCYTES NFR BLD AUTO: 10 % (ref 4–12)
NEUTROPHILS # BLD AUTO: 4.87 THOUSANDS/ΜL (ref 1.85–7.62)
NEUTS SEG NFR BLD AUTO: 55 % (ref 43–75)
NRBC BLD AUTO-RTO: 0 /100 WBCS
PLATELET # BLD AUTO: 263 THOUSANDS/UL (ref 149–390)
PMV BLD AUTO: 9.2 FL (ref 8.9–12.7)
POTASSIUM SERPL-SCNC: 4.3 MMOL/L (ref 3.5–5.3)
RBC # BLD AUTO: 4.21 MILLION/UL (ref 3.81–5.12)
SODIUM SERPL-SCNC: 138 MMOL/L (ref 136–145)
WBC # BLD AUTO: 8.75 THOUSAND/UL (ref 4.31–10.16)

## 2017-08-07 PROCEDURE — 80048 BASIC METABOLIC PNL TOTAL CA: CPT | Performed by: EMERGENCY MEDICINE

## 2017-08-07 PROCEDURE — 97530 THERAPEUTIC ACTIVITIES: CPT

## 2017-08-07 PROCEDURE — 85025 COMPLETE CBC W/AUTO DIFF WBC: CPT | Performed by: EMERGENCY MEDICINE

## 2017-08-07 PROCEDURE — 92526 ORAL FUNCTION THERAPY: CPT

## 2017-08-07 PROCEDURE — 97535 SELF CARE MNGMENT TRAINING: CPT

## 2017-08-07 PROCEDURE — 97116 GAIT TRAINING THERAPY: CPT

## 2017-08-07 PROCEDURE — 93799 UNLISTED CV SVC/PROCEDURE: CPT

## 2017-08-07 PROCEDURE — 93880 EXTRACRANIAL BILAT STUDY: CPT

## 2017-08-07 PROCEDURE — 93306 TTE W/DOPPLER COMPLETE: CPT

## 2017-08-07 RX ORDER — OXYCODONE HYDROCHLORIDE 5 MG/1
2.5 TABLET ORAL EVERY 4 HOURS PRN
Status: DISCONTINUED | OUTPATIENT
Start: 2017-08-07 | End: 2017-08-10 | Stop reason: HOSPADM

## 2017-08-07 RX ORDER — CALCIUM CARBONATE 500(1250)
1 TABLET ORAL 2 TIMES DAILY WITH MEALS
Status: DISCONTINUED | OUTPATIENT
Start: 2017-08-07 | End: 2017-08-10 | Stop reason: HOSPADM

## 2017-08-07 RX ORDER — ACETAMINOPHEN 325 MG/1
650 TABLET ORAL EVERY 6 HOURS PRN
Status: DISCONTINUED | OUTPATIENT
Start: 2017-08-07 | End: 2017-08-08

## 2017-08-07 RX ORDER — MELATONIN
1000 DAILY
Status: DISCONTINUED | OUTPATIENT
Start: 2017-08-07 | End: 2017-08-10 | Stop reason: HOSPADM

## 2017-08-07 RX ORDER — SENNOSIDES 8.6 MG
1 TABLET ORAL
Status: DISCONTINUED | OUTPATIENT
Start: 2017-08-07 | End: 2017-08-10 | Stop reason: HOSPADM

## 2017-08-07 RX ORDER — DOCUSATE SODIUM 100 MG/1
100 CAPSULE, LIQUID FILLED ORAL 2 TIMES DAILY
Status: DISCONTINUED | OUTPATIENT
Start: 2017-08-07 | End: 2017-08-10 | Stop reason: HOSPADM

## 2017-08-07 RX ADMIN — MORPHINE SULFATE 2 MG: 2 INJECTION, SOLUTION INTRAMUSCULAR; INTRAVENOUS at 09:04

## 2017-08-07 RX ADMIN — Medication 1 TABLET: at 15:45

## 2017-08-07 RX ADMIN — OXYCODONE HYDROCHLORIDE 2.5 MG: 5 TABLET ORAL at 23:39

## 2017-08-07 RX ADMIN — MORPHINE SULFATE 2 MG: 2 INJECTION, SOLUTION INTRAMUSCULAR; INTRAVENOUS at 04:47

## 2017-08-07 RX ADMIN — OXYCODONE HYDROCHLORIDE 2.5 MG: 5 TABLET ORAL at 15:45

## 2017-08-07 RX ADMIN — ATORVASTATIN CALCIUM 40 MG: 40 TABLET, FILM COATED ORAL at 15:45

## 2017-08-07 RX ADMIN — VITAMIN D, TAB 1000IU (100/BT) 1000 UNITS: 25 TAB at 11:57

## 2017-08-07 RX ADMIN — MORPHINE SULFATE 2 MG: 2 INJECTION, SOLUTION INTRAMUSCULAR; INTRAVENOUS at 00:19

## 2017-08-08 ENCOUNTER — APPOINTMENT (INPATIENT)
Dept: RADIOLOGY | Facility: HOSPITAL | Age: 72
DRG: 085 | End: 2017-08-08
Payer: COMMERCIAL

## 2017-08-08 ENCOUNTER — APPOINTMENT (INPATIENT)
Dept: OCCUPATIONAL THERAPY | Facility: HOSPITAL | Age: 72
DRG: 085 | End: 2017-08-08
Payer: COMMERCIAL

## 2017-08-08 LAB
FOLATE SERPL-MCNC: 15.3 NG/ML (ref 3.1–17.5)
TSH SERPL DL<=0.05 MIU/L-ACNC: 1.65 UIU/ML (ref 0.36–3.74)
VIT B12 SERPL-MCNC: 2915 PG/ML (ref 100–900)

## 2017-08-08 PROCEDURE — 97535 SELF CARE MNGMENT TRAINING: CPT

## 2017-08-08 PROCEDURE — 84443 ASSAY THYROID STIM HORMONE: CPT | Performed by: FAMILY MEDICINE

## 2017-08-08 PROCEDURE — 82746 ASSAY OF FOLIC ACID SERUM: CPT | Performed by: FAMILY MEDICINE

## 2017-08-08 PROCEDURE — 70450 CT HEAD/BRAIN W/O DYE: CPT

## 2017-08-08 PROCEDURE — 82607 VITAMIN B-12: CPT | Performed by: FAMILY MEDICINE

## 2017-08-08 RX ORDER — ACETAMINOPHEN 325 MG/1
975 TABLET ORAL EVERY 8 HOURS SCHEDULED
Status: DISCONTINUED | OUTPATIENT
Start: 2017-08-08 | End: 2017-08-10 | Stop reason: HOSPADM

## 2017-08-08 RX ADMIN — OXYCODONE HYDROCHLORIDE 2.5 MG: 5 TABLET ORAL at 08:35

## 2017-08-08 RX ADMIN — OXYCODONE HYDROCHLORIDE 2.5 MG: 5 TABLET ORAL at 14:26

## 2017-08-08 RX ADMIN — ACETAMINOPHEN 975 MG: 325 TABLET, FILM COATED ORAL at 22:33

## 2017-08-08 RX ADMIN — DOCUSATE SODIUM 100 MG: 100 CAPSULE, LIQUID FILLED ORAL at 18:48

## 2017-08-08 RX ADMIN — VITAMIN D, TAB 1000IU (100/BT) 1000 UNITS: 25 TAB at 08:35

## 2017-08-08 RX ADMIN — Medication 1 TABLET: at 08:39

## 2017-08-08 RX ADMIN — Medication 1 TABLET: at 18:48

## 2017-08-08 RX ADMIN — ACETAMINOPHEN 975 MG: 325 TABLET, FILM COATED ORAL at 14:20

## 2017-08-08 RX ADMIN — OXYCODONE HYDROCHLORIDE 2.5 MG: 5 TABLET ORAL at 20:20

## 2017-08-08 RX ADMIN — DOCUSATE SODIUM 100 MG: 100 CAPSULE, LIQUID FILLED ORAL at 08:36

## 2017-08-08 RX ADMIN — ATORVASTATIN CALCIUM 40 MG: 40 TABLET, FILM COATED ORAL at 18:47

## 2017-08-09 ENCOUNTER — APPOINTMENT (INPATIENT)
Dept: PHYSICAL THERAPY | Facility: HOSPITAL | Age: 72
DRG: 085 | End: 2017-08-09
Payer: COMMERCIAL

## 2017-08-09 ENCOUNTER — GENERIC CONVERSION - ENCOUNTER (OUTPATIENT)
Dept: OTHER | Facility: OTHER | Age: 72
End: 2017-08-09

## 2017-08-09 PROBLEM — R53.81 DEBILITY: Status: ACTIVE | Noted: 2017-08-09

## 2017-08-09 PROCEDURE — 97116 GAIT TRAINING THERAPY: CPT

## 2017-08-09 PROCEDURE — 97530 THERAPEUTIC ACTIVITIES: CPT

## 2017-08-09 PROCEDURE — 97110 THERAPEUTIC EXERCISES: CPT

## 2017-08-09 PROCEDURE — 97535 SELF CARE MNGMENT TRAINING: CPT

## 2017-08-09 PROCEDURE — 97112 NEUROMUSCULAR REEDUCATION: CPT

## 2017-08-09 RX ORDER — ASPIRIN 81 MG/1
81 TABLET ORAL DAILY
Status: DISCONTINUED | OUTPATIENT
Start: 2017-08-09 | End: 2017-08-10 | Stop reason: HOSPADM

## 2017-08-09 RX ADMIN — Medication 1 TABLET: at 08:31

## 2017-08-09 RX ADMIN — ACETAMINOPHEN 975 MG: 325 TABLET, FILM COATED ORAL at 22:39

## 2017-08-09 RX ADMIN — Medication 1 TABLET: at 17:24

## 2017-08-09 RX ADMIN — VITAMIN D, TAB 1000IU (100/BT) 1000 UNITS: 25 TAB at 08:31

## 2017-08-09 RX ADMIN — ACETAMINOPHEN 975 MG: 325 TABLET, FILM COATED ORAL at 05:13

## 2017-08-09 RX ADMIN — DOCUSATE SODIUM 100 MG: 100 CAPSULE, LIQUID FILLED ORAL at 08:31

## 2017-08-09 RX ADMIN — ACETAMINOPHEN 975 MG: 325 TABLET, FILM COATED ORAL at 13:45

## 2017-08-09 RX ADMIN — ATORVASTATIN CALCIUM 40 MG: 40 TABLET, FILM COATED ORAL at 17:24

## 2017-08-09 RX ADMIN — ASPIRIN 81 MG: 81 TABLET, COATED ORAL at 13:45

## 2017-08-10 ENCOUNTER — HOSPITAL ENCOUNTER (INPATIENT)
Facility: HOSPITAL | Age: 72
LOS: 14 days | Discharge: HOME HEALTH | DRG: 057 | End: 2017-08-24
Attending: PHYSICAL MEDICINE & REHABILITATION | Admitting: PHYSICAL MEDICINE & REHABILITATION
Payer: COMMERCIAL

## 2017-08-10 ENCOUNTER — APPOINTMENT (INPATIENT)
Dept: OCCUPATIONAL THERAPY | Facility: HOSPITAL | Age: 72
DRG: 085 | End: 2017-08-10
Payer: COMMERCIAL

## 2017-08-10 VITALS
TEMPERATURE: 98.9 F | BODY MASS INDEX: 23.07 KG/M2 | HEIGHT: 66 IN | HEART RATE: 70 BPM | RESPIRATION RATE: 18 BRPM | SYSTOLIC BLOOD PRESSURE: 126 MMHG | WEIGHT: 143.52 LBS | DIASTOLIC BLOOD PRESSURE: 63 MMHG | OXYGEN SATURATION: 97 %

## 2017-08-10 DIAGNOSIS — E87.1 HYPONATREMIA: ICD-10-CM

## 2017-08-10 DIAGNOSIS — D18.1 HYGROMA: ICD-10-CM

## 2017-08-10 DIAGNOSIS — I63.9 CVA (CEREBRAL VASCULAR ACCIDENT) (HCC): Primary | ICD-10-CM

## 2017-08-10 DIAGNOSIS — L60.9 NAIL ABNORMALITY: ICD-10-CM

## 2017-08-10 DIAGNOSIS — Z98.811 DENTAL FILLING STATUS: ICD-10-CM

## 2017-08-10 DIAGNOSIS — R53.81 DEBILITY: ICD-10-CM

## 2017-08-10 RX ORDER — ACETAMINOPHEN 325 MG/1
975 TABLET ORAL EVERY 8 HOURS SCHEDULED
Status: DISCONTINUED | OUTPATIENT
Start: 2017-08-10 | End: 2017-08-10

## 2017-08-10 RX ORDER — SENNOSIDES 8.6 MG
1 TABLET ORAL
Status: CANCELLED | OUTPATIENT
Start: 2017-08-10

## 2017-08-10 RX ORDER — OXYCODONE HYDROCHLORIDE 5 MG/1
2.5 TABLET ORAL EVERY 4 HOURS PRN
Status: CANCELLED | OUTPATIENT
Start: 2017-08-10

## 2017-08-10 RX ORDER — POLYETHYLENE GLYCOL 3350 17 G/17G
17 POWDER, FOR SOLUTION ORAL DAILY PRN
Status: DISCONTINUED | OUTPATIENT
Start: 2017-08-10 | End: 2017-08-23

## 2017-08-10 RX ORDER — HEPARIN SODIUM 5000 [USP'U]/ML
5000 INJECTION, SOLUTION INTRAVENOUS; SUBCUTANEOUS EVERY 8 HOURS SCHEDULED
Status: DISCONTINUED | OUTPATIENT
Start: 2017-08-10 | End: 2017-08-11

## 2017-08-10 RX ORDER — ATORVASTATIN CALCIUM 40 MG/1
40 TABLET, FILM COATED ORAL
Status: DISCONTINUED | OUTPATIENT
Start: 2017-08-10 | End: 2017-08-11

## 2017-08-10 RX ORDER — CALCIUM CARBONATE 500(1250)
1 TABLET ORAL 2 TIMES DAILY WITH MEALS
Status: CANCELLED | OUTPATIENT
Start: 2017-08-10

## 2017-08-10 RX ORDER — MELATONIN
1000 DAILY
Status: DISCONTINUED | OUTPATIENT
Start: 2017-08-11 | End: 2017-08-24 | Stop reason: HOSPADM

## 2017-08-10 RX ORDER — ACETAMINOPHEN 325 MG/1
975 TABLET ORAL EVERY 8 HOURS SCHEDULED
Status: CANCELLED | OUTPATIENT
Start: 2017-08-10

## 2017-08-10 RX ORDER — ATORVASTATIN CALCIUM 40 MG/1
40 TABLET, FILM COATED ORAL
Status: CANCELLED | OUTPATIENT
Start: 2017-08-10

## 2017-08-10 RX ORDER — ACETAMINOPHEN 325 MG/1
650 TABLET ORAL EVERY 6 HOURS PRN
Status: DISCONTINUED | OUTPATIENT
Start: 2017-08-10 | End: 2017-08-10

## 2017-08-10 RX ORDER — MELATONIN
1000 DAILY
Status: CANCELLED | OUTPATIENT
Start: 2017-08-11

## 2017-08-10 RX ORDER — SENNOSIDES 8.6 MG
1 TABLET ORAL
Status: DISCONTINUED | OUTPATIENT
Start: 2017-08-10 | End: 2017-08-23

## 2017-08-10 RX ORDER — BISACODYL 10 MG
10 SUPPOSITORY, RECTAL RECTAL DAILY PRN
Status: DISCONTINUED | OUTPATIENT
Start: 2017-08-10 | End: 2017-08-23

## 2017-08-10 RX ORDER — ACETAMINOPHEN 325 MG/1
650 TABLET ORAL EVERY 6 HOURS PRN
Status: DISCONTINUED | OUTPATIENT
Start: 2017-08-10 | End: 2017-08-23

## 2017-08-10 RX ORDER — DOCUSATE SODIUM 100 MG/1
100 CAPSULE, LIQUID FILLED ORAL 2 TIMES DAILY
Status: DISCONTINUED | OUTPATIENT
Start: 2017-08-10 | End: 2017-08-23

## 2017-08-10 RX ORDER — DOCUSATE SODIUM 100 MG/1
100 CAPSULE, LIQUID FILLED ORAL 2 TIMES DAILY
Status: CANCELLED | OUTPATIENT
Start: 2017-08-10

## 2017-08-10 RX ORDER — OXYCODONE HYDROCHLORIDE 5 MG/1
2.5 TABLET ORAL EVERY 4 HOURS PRN
Status: DISCONTINUED | OUTPATIENT
Start: 2017-08-10 | End: 2017-08-24 | Stop reason: HOSPADM

## 2017-08-10 RX ORDER — ONDANSETRON 4 MG/1
4 TABLET, ORALLY DISINTEGRATING ORAL EVERY 6 HOURS PRN
Status: DISCONTINUED | OUTPATIENT
Start: 2017-08-10 | End: 2017-08-23

## 2017-08-10 RX ORDER — CALCIUM CARBONATE 500(1250)
1 TABLET ORAL 2 TIMES DAILY WITH MEALS
Status: DISCONTINUED | OUTPATIENT
Start: 2017-08-10 | End: 2017-08-24 | Stop reason: HOSPADM

## 2017-08-10 RX ORDER — ASPIRIN 81 MG/1
81 TABLET ORAL DAILY
Status: CANCELLED | OUTPATIENT
Start: 2017-08-11

## 2017-08-10 RX ORDER — ASPIRIN 81 MG/1
81 TABLET ORAL DAILY
Status: DISCONTINUED | OUTPATIENT
Start: 2017-08-11 | End: 2017-08-24 | Stop reason: HOSPADM

## 2017-08-10 RX ORDER — ECHINACEA PURPUREA EXTRACT 125 MG
1 TABLET ORAL
Status: DISCONTINUED | OUTPATIENT
Start: 2017-08-10 | End: 2017-08-23

## 2017-08-10 RX ADMIN — HEPARIN SODIUM 5000 UNITS: 5000 INJECTION, SOLUTION INTRAVENOUS; SUBCUTANEOUS at 22:08

## 2017-08-10 RX ADMIN — OXYCODONE HYDROCHLORIDE 2.5 MG: 5 TABLET ORAL at 22:07

## 2017-08-10 RX ADMIN — Medication 1 TABLET: at 17:22

## 2017-08-10 RX ADMIN — DOCUSATE SODIUM 100 MG: 100 CAPSULE, LIQUID FILLED ORAL at 17:22

## 2017-08-10 RX ADMIN — HEPARIN SODIUM 5000 UNITS: 5000 INJECTION, SOLUTION INTRAVENOUS; SUBCUTANEOUS at 15:54

## 2017-08-10 RX ADMIN — ACETAMINOPHEN 975 MG: 325 TABLET, FILM COATED ORAL at 05:09

## 2017-08-10 RX ADMIN — ATORVASTATIN CALCIUM 40 MG: 40 TABLET, FILM COATED ORAL at 17:22

## 2017-08-10 RX ADMIN — ACETAMINOPHEN 975 MG: 325 TABLET, FILM COATED ORAL at 13:08

## 2017-08-10 RX ADMIN — ASPIRIN 81 MG: 81 TABLET, COATED ORAL at 09:12

## 2017-08-10 RX ADMIN — VITAMIN D, TAB 1000IU (100/BT) 1000 UNITS: 25 TAB at 09:12

## 2017-08-10 RX ADMIN — Medication 1 TABLET: at 07:35

## 2017-08-11 LAB
CHOLEST SERPL-MCNC: 96 MG/DL (ref 50–200)
EST. AVERAGE GLUCOSE BLD GHB EST-MCNC: 114 MG/DL
HBA1C MFR BLD: 5.6 % (ref 4.2–6.3)
HDLC SERPL-MCNC: 43 MG/DL (ref 40–60)
LDLC SERPL CALC-MCNC: 28 MG/DL (ref 0–100)
TRIGL SERPL-MCNC: 124 MG/DL

## 2017-08-11 PROCEDURE — 97162 PT EVAL MOD COMPLEX 30 MIN: CPT

## 2017-08-11 PROCEDURE — 97166 OT EVAL MOD COMPLEX 45 MIN: CPT

## 2017-08-11 PROCEDURE — 92610 EVALUATE SWALLOWING FUNCTION: CPT

## 2017-08-11 PROCEDURE — 97530 THERAPEUTIC ACTIVITIES: CPT

## 2017-08-11 PROCEDURE — 83036 HEMOGLOBIN GLYCOSYLATED A1C: CPT | Performed by: PHYSICIAN ASSISTANT

## 2017-08-11 PROCEDURE — 97535 SELF CARE MNGMENT TRAINING: CPT

## 2017-08-11 PROCEDURE — 97532 HB COGNITIVE SKILLS DEVELOPMENT: CPT

## 2017-08-11 PROCEDURE — 80061 LIPID PANEL: CPT | Performed by: PHYSICIAN ASSISTANT

## 2017-08-11 PROCEDURE — 92523 SPEECH SOUND LANG COMPREHEN: CPT

## 2017-08-11 PROCEDURE — 97110 THERAPEUTIC EXERCISES: CPT

## 2017-08-11 RX ORDER — ACETAMINOPHEN 325 MG/1
650 TABLET ORAL EVERY 6 HOURS SCHEDULED
Status: DISCONTINUED | OUTPATIENT
Start: 2017-08-11 | End: 2017-08-24 | Stop reason: HOSPADM

## 2017-08-11 RX ORDER — HEPARIN SODIUM 5000 [USP'U]/ML
5000 INJECTION, SOLUTION INTRAVENOUS; SUBCUTANEOUS EVERY 8 HOURS SCHEDULED
Status: DISCONTINUED | OUTPATIENT
Start: 2017-08-11 | End: 2017-08-21

## 2017-08-11 RX ORDER — ATORVASTATIN CALCIUM 10 MG/1
10 TABLET, FILM COATED ORAL
Status: DISCONTINUED | OUTPATIENT
Start: 2017-08-11 | End: 2017-08-24 | Stop reason: HOSPADM

## 2017-08-11 RX ADMIN — VITAMIN D, TAB 1000IU (100/BT) 1000 UNITS: 25 TAB at 09:51

## 2017-08-11 RX ADMIN — ASPIRIN 81 MG: 81 TABLET, COATED ORAL at 09:51

## 2017-08-11 RX ADMIN — ACETAMINOPHEN 650 MG: 325 TABLET ORAL at 12:30

## 2017-08-11 RX ADMIN — Medication 1 TABLET: at 09:52

## 2017-08-11 RX ADMIN — OXYCODONE HYDROCHLORIDE 2.5 MG: 5 TABLET ORAL at 03:54

## 2017-08-11 RX ADMIN — HEPARIN SODIUM 5000 UNITS: 5000 INJECTION, SOLUTION INTRAVENOUS; SUBCUTANEOUS at 15:40

## 2017-08-11 RX ADMIN — DOCUSATE SODIUM 100 MG: 100 CAPSULE, LIQUID FILLED ORAL at 17:58

## 2017-08-11 RX ADMIN — ATORVASTATIN CALCIUM 10 MG: 10 TABLET, FILM COATED ORAL at 15:41

## 2017-08-11 RX ADMIN — HEPARIN SODIUM 5000 UNITS: 5000 INJECTION, SOLUTION INTRAVENOUS; SUBCUTANEOUS at 22:21

## 2017-08-11 RX ADMIN — Medication 1 TABLET: at 15:40

## 2017-08-11 RX ADMIN — DOCUSATE SODIUM 100 MG: 100 CAPSULE, LIQUID FILLED ORAL at 09:52

## 2017-08-11 RX ADMIN — OXYCODONE HYDROCHLORIDE 2.5 MG: 5 TABLET ORAL at 10:10

## 2017-08-11 RX ADMIN — ACETAMINOPHEN 650 MG: 325 TABLET ORAL at 17:57

## 2017-08-11 RX ADMIN — HEPARIN SODIUM 5000 UNITS: 5000 INJECTION, SOLUTION INTRAVENOUS; SUBCUTANEOUS at 09:51

## 2017-08-11 RX ADMIN — OXYCODONE HYDROCHLORIDE 2.5 MG: 5 TABLET ORAL at 20:03

## 2017-08-12 PROCEDURE — 97116 GAIT TRAINING THERAPY: CPT

## 2017-08-12 PROCEDURE — 97530 THERAPEUTIC ACTIVITIES: CPT

## 2017-08-12 PROCEDURE — 92526 ORAL FUNCTION THERAPY: CPT

## 2017-08-12 RX ADMIN — DOCUSATE SODIUM 100 MG: 100 CAPSULE, LIQUID FILLED ORAL at 18:07

## 2017-08-12 RX ADMIN — Medication 1 TABLET: at 08:59

## 2017-08-12 RX ADMIN — Medication 1 TABLET: at 18:08

## 2017-08-12 RX ADMIN — OXYCODONE HYDROCHLORIDE 2.5 MG: 5 TABLET ORAL at 21:00

## 2017-08-12 RX ADMIN — DOCUSATE SODIUM 100 MG: 100 CAPSULE, LIQUID FILLED ORAL at 08:58

## 2017-08-12 RX ADMIN — ACETAMINOPHEN 650 MG: 325 TABLET ORAL at 18:07

## 2017-08-12 RX ADMIN — HEPARIN SODIUM 5000 UNITS: 5000 INJECTION, SOLUTION INTRAVENOUS; SUBCUTANEOUS at 13:53

## 2017-08-12 RX ADMIN — Medication 1 SPRAY: at 09:01

## 2017-08-12 RX ADMIN — VITAMIN D, TAB 1000IU (100/BT) 1000 UNITS: 25 TAB at 08:58

## 2017-08-12 RX ADMIN — ACETAMINOPHEN 650 MG: 325 TABLET ORAL at 05:23

## 2017-08-12 RX ADMIN — ACETAMINOPHEN 650 MG: 325 TABLET ORAL at 11:15

## 2017-08-12 RX ADMIN — ASPIRIN 81 MG: 81 TABLET, COATED ORAL at 08:58

## 2017-08-12 RX ADMIN — HEPARIN SODIUM 5000 UNITS: 5000 INJECTION, SOLUTION INTRAVENOUS; SUBCUTANEOUS at 21:07

## 2017-08-12 RX ADMIN — ATORVASTATIN CALCIUM 10 MG: 10 TABLET, FILM COATED ORAL at 18:07

## 2017-08-12 RX ADMIN — HEPARIN SODIUM 5000 UNITS: 5000 INJECTION, SOLUTION INTRAVENOUS; SUBCUTANEOUS at 05:23

## 2017-08-12 RX ADMIN — ACETAMINOPHEN 650 MG: 325 TABLET ORAL at 00:09

## 2017-08-12 RX ADMIN — OXYCODONE HYDROCHLORIDE 2.5 MG: 5 TABLET ORAL at 09:06

## 2017-08-13 PROCEDURE — 97530 THERAPEUTIC ACTIVITIES: CPT

## 2017-08-13 PROCEDURE — 97116 GAIT TRAINING THERAPY: CPT

## 2017-08-13 RX ADMIN — ACETAMINOPHEN 650 MG: 325 TABLET ORAL at 17:34

## 2017-08-13 RX ADMIN — Medication 1 SPRAY: at 17:35

## 2017-08-13 RX ADMIN — ASPIRIN 81 MG: 81 TABLET, COATED ORAL at 08:55

## 2017-08-13 RX ADMIN — Medication 1 TABLET: at 08:57

## 2017-08-13 RX ADMIN — ACETAMINOPHEN 650 MG: 325 TABLET ORAL at 23:21

## 2017-08-13 RX ADMIN — DOCUSATE SODIUM 100 MG: 100 CAPSULE, LIQUID FILLED ORAL at 17:34

## 2017-08-13 RX ADMIN — ACETAMINOPHEN 650 MG: 325 TABLET ORAL at 05:40

## 2017-08-13 RX ADMIN — ACETAMINOPHEN 650 MG: 325 TABLET ORAL at 11:44

## 2017-08-13 RX ADMIN — OXYCODONE HYDROCHLORIDE 2.5 MG: 5 TABLET ORAL at 08:55

## 2017-08-13 RX ADMIN — HEPARIN SODIUM 5000 UNITS: 5000 INJECTION, SOLUTION INTRAVENOUS; SUBCUTANEOUS at 13:10

## 2017-08-13 RX ADMIN — ACETAMINOPHEN 650 MG: 325 TABLET ORAL at 00:34

## 2017-08-13 RX ADMIN — HEPARIN SODIUM 5000 UNITS: 5000 INJECTION, SOLUTION INTRAVENOUS; SUBCUTANEOUS at 05:39

## 2017-08-13 RX ADMIN — DOCUSATE SODIUM 100 MG: 100 CAPSULE, LIQUID FILLED ORAL at 08:55

## 2017-08-13 RX ADMIN — ATORVASTATIN CALCIUM 10 MG: 10 TABLET, FILM COATED ORAL at 17:34

## 2017-08-13 RX ADMIN — VITAMIN D, TAB 1000IU (100/BT) 1000 UNITS: 25 TAB at 08:55

## 2017-08-13 RX ADMIN — OXYCODONE HYDROCHLORIDE 2.5 MG: 5 TABLET ORAL at 20:56

## 2017-08-13 RX ADMIN — Medication 1 TABLET: at 17:35

## 2017-08-13 RX ADMIN — HEPARIN SODIUM 5000 UNITS: 5000 INJECTION, SOLUTION INTRAVENOUS; SUBCUTANEOUS at 23:21

## 2017-08-14 LAB
ANION GAP SERPL CALCULATED.3IONS-SCNC: 7 MMOL/L (ref 4–13)
BASOPHILS # BLD AUTO: 0.03 THOUSANDS/ΜL (ref 0–0.1)
BASOPHILS NFR BLD AUTO: 0 % (ref 0–1)
BUN SERPL-MCNC: 15 MG/DL (ref 5–25)
CALCIUM SERPL-MCNC: 9 MG/DL (ref 8.3–10.1)
CHLORIDE SERPL-SCNC: 102 MMOL/L (ref 100–108)
CO2 SERPL-SCNC: 26 MMOL/L (ref 21–32)
CREAT SERPL-MCNC: 0.56 MG/DL (ref 0.6–1.3)
EOSINOPHIL # BLD AUTO: 0.16 THOUSAND/ΜL (ref 0–0.61)
EOSINOPHIL NFR BLD AUTO: 2 % (ref 0–6)
ERYTHROCYTE [DISTWIDTH] IN BLOOD BY AUTOMATED COUNT: 13.7 % (ref 11.6–15.1)
GFR SERPL CREATININE-BSD FRML MDRD: 93 ML/MIN/1.73SQ M
GLUCOSE P FAST SERPL-MCNC: 71 MG/DL (ref 65–99)
GLUCOSE SERPL-MCNC: 71 MG/DL (ref 65–140)
HCT VFR BLD AUTO: 35 % (ref 34.8–46.1)
HGB BLD-MCNC: 11.8 G/DL (ref 11.5–15.4)
LYMPHOCYTES # BLD AUTO: 2.61 THOUSANDS/ΜL (ref 0.6–4.47)
LYMPHOCYTES NFR BLD AUTO: 37 % (ref 14–44)
MCH RBC QN AUTO: 30.7 PG (ref 26.8–34.3)
MCHC RBC AUTO-ENTMCNC: 33.7 G/DL (ref 31.4–37.4)
MCV RBC AUTO: 91 FL (ref 82–98)
MONOCYTES # BLD AUTO: 0.7 THOUSAND/ΜL (ref 0.17–1.22)
MONOCYTES NFR BLD AUTO: 10 % (ref 4–12)
NEUTROPHILS # BLD AUTO: 3.54 THOUSANDS/ΜL (ref 1.85–7.62)
NEUTS SEG NFR BLD AUTO: 51 % (ref 43–75)
NRBC BLD AUTO-RTO: 0 /100 WBCS
PLATELET # BLD AUTO: 332 THOUSANDS/UL (ref 149–390)
PMV BLD AUTO: 9.1 FL (ref 8.9–12.7)
POTASSIUM SERPL-SCNC: 4.2 MMOL/L (ref 3.5–5.3)
RBC # BLD AUTO: 3.84 MILLION/UL (ref 3.81–5.12)
SODIUM SERPL-SCNC: 135 MMOL/L (ref 136–145)
WBC # BLD AUTO: 7.07 THOUSAND/UL (ref 4.31–10.16)

## 2017-08-14 PROCEDURE — 85025 COMPLETE CBC W/AUTO DIFF WBC: CPT | Performed by: PHYSICIAN ASSISTANT

## 2017-08-14 PROCEDURE — 97112 NEUROMUSCULAR REEDUCATION: CPT

## 2017-08-14 PROCEDURE — 97110 THERAPEUTIC EXERCISES: CPT

## 2017-08-14 PROCEDURE — 80048 BASIC METABOLIC PNL TOTAL CA: CPT | Performed by: PHYSICIAN ASSISTANT

## 2017-08-14 PROCEDURE — 97530 THERAPEUTIC ACTIVITIES: CPT

## 2017-08-14 PROCEDURE — 97535 SELF CARE MNGMENT TRAINING: CPT

## 2017-08-14 PROCEDURE — 92508 TX SP LANG VOICE COMM GROUP: CPT

## 2017-08-14 PROCEDURE — 97116 GAIT TRAINING THERAPY: CPT

## 2017-08-14 RX ADMIN — HEPARIN SODIUM 5000 UNITS: 5000 INJECTION, SOLUTION INTRAVENOUS; SUBCUTANEOUS at 14:31

## 2017-08-14 RX ADMIN — HEPARIN SODIUM 5000 UNITS: 5000 INJECTION, SOLUTION INTRAVENOUS; SUBCUTANEOUS at 05:14

## 2017-08-14 RX ADMIN — ATORVASTATIN CALCIUM 10 MG: 10 TABLET, FILM COATED ORAL at 17:55

## 2017-08-14 RX ADMIN — Medication 1 TABLET: at 17:57

## 2017-08-14 RX ADMIN — OXYCODONE HYDROCHLORIDE 2.5 MG: 5 TABLET ORAL at 10:28

## 2017-08-14 RX ADMIN — VITAMIN D, TAB 1000IU (100/BT) 1000 UNITS: 25 TAB at 10:16

## 2017-08-14 RX ADMIN — HEPARIN SODIUM 5000 UNITS: 5000 INJECTION, SOLUTION INTRAVENOUS; SUBCUTANEOUS at 21:23

## 2017-08-14 RX ADMIN — ACETAMINOPHEN 650 MG: 325 TABLET ORAL at 11:57

## 2017-08-14 RX ADMIN — DOCUSATE SODIUM 100 MG: 100 CAPSULE, LIQUID FILLED ORAL at 17:55

## 2017-08-14 RX ADMIN — Medication 1 TABLET: at 10:17

## 2017-08-14 RX ADMIN — DOCUSATE SODIUM 100 MG: 100 CAPSULE, LIQUID FILLED ORAL at 10:16

## 2017-08-14 RX ADMIN — ASPIRIN 81 MG: 81 TABLET, COATED ORAL at 10:16

## 2017-08-14 RX ADMIN — ACETAMINOPHEN 650 MG: 325 TABLET ORAL at 17:55

## 2017-08-14 RX ADMIN — ACETAMINOPHEN 650 MG: 325 TABLET ORAL at 05:14

## 2017-08-15 PROCEDURE — 97116 GAIT TRAINING THERAPY: CPT

## 2017-08-15 PROCEDURE — 97535 SELF CARE MNGMENT TRAINING: CPT | Performed by: STUDENT IN AN ORGANIZED HEALTH CARE EDUCATION/TRAINING PROGRAM

## 2017-08-15 PROCEDURE — 97530 THERAPEUTIC ACTIVITIES: CPT

## 2017-08-15 PROCEDURE — 97535 SELF CARE MNGMENT TRAINING: CPT

## 2017-08-15 PROCEDURE — 92526 ORAL FUNCTION THERAPY: CPT

## 2017-08-15 RX ADMIN — ATORVASTATIN CALCIUM 10 MG: 10 TABLET, FILM COATED ORAL at 17:25

## 2017-08-15 RX ADMIN — HEPARIN SODIUM 5000 UNITS: 5000 INJECTION, SOLUTION INTRAVENOUS; SUBCUTANEOUS at 15:11

## 2017-08-15 RX ADMIN — ASPIRIN 81 MG: 81 TABLET, COATED ORAL at 09:21

## 2017-08-15 RX ADMIN — ACETAMINOPHEN 650 MG: 325 TABLET ORAL at 23:20

## 2017-08-15 RX ADMIN — HEPARIN SODIUM 5000 UNITS: 5000 INJECTION, SOLUTION INTRAVENOUS; SUBCUTANEOUS at 06:00

## 2017-08-15 RX ADMIN — DOCUSATE SODIUM 100 MG: 100 CAPSULE, LIQUID FILLED ORAL at 09:21

## 2017-08-15 RX ADMIN — VITAMIN D, TAB 1000IU (100/BT) 1000 UNITS: 25 TAB at 09:21

## 2017-08-15 RX ADMIN — DOCUSATE SODIUM 100 MG: 100 CAPSULE, LIQUID FILLED ORAL at 17:26

## 2017-08-15 RX ADMIN — Medication 1 TABLET: at 09:21

## 2017-08-15 RX ADMIN — ACETAMINOPHEN 650 MG: 325 TABLET ORAL at 00:04

## 2017-08-15 RX ADMIN — ACETAMINOPHEN 650 MG: 325 TABLET ORAL at 12:29

## 2017-08-15 RX ADMIN — OXYCODONE HYDROCHLORIDE 2.5 MG: 5 TABLET ORAL at 20:08

## 2017-08-15 RX ADMIN — ACETAMINOPHEN 650 MG: 325 TABLET ORAL at 17:25

## 2017-08-15 RX ADMIN — HEPARIN SODIUM 5000 UNITS: 5000 INJECTION, SOLUTION INTRAVENOUS; SUBCUTANEOUS at 21:40

## 2017-08-15 RX ADMIN — ACETAMINOPHEN 650 MG: 325 TABLET ORAL at 06:00

## 2017-08-15 RX ADMIN — OXYCODONE HYDROCHLORIDE 2.5 MG: 5 TABLET ORAL at 09:27

## 2017-08-16 PROCEDURE — 97116 GAIT TRAINING THERAPY: CPT

## 2017-08-16 PROCEDURE — 97110 THERAPEUTIC EXERCISES: CPT

## 2017-08-16 PROCEDURE — 92526 ORAL FUNCTION THERAPY: CPT

## 2017-08-16 PROCEDURE — 97535 SELF CARE MNGMENT TRAINING: CPT

## 2017-08-16 PROCEDURE — 97530 THERAPEUTIC ACTIVITIES: CPT

## 2017-08-16 PROCEDURE — 92508 TX SP LANG VOICE COMM GROUP: CPT

## 2017-08-16 RX ORDER — LIDOCAINE 50 MG/G
2 PATCH TOPICAL DAILY
Status: DISCONTINUED | OUTPATIENT
Start: 2017-08-16 | End: 2017-08-17

## 2017-08-16 RX ADMIN — DOCUSATE SODIUM 100 MG: 100 CAPSULE, LIQUID FILLED ORAL at 08:10

## 2017-08-16 RX ADMIN — ATORVASTATIN CALCIUM 10 MG: 10 TABLET, FILM COATED ORAL at 16:19

## 2017-08-16 RX ADMIN — ACETAMINOPHEN 650 MG: 325 TABLET ORAL at 12:34

## 2017-08-16 RX ADMIN — ASPIRIN 81 MG: 81 TABLET, COATED ORAL at 08:10

## 2017-08-16 RX ADMIN — HEPARIN SODIUM 5000 UNITS: 5000 INJECTION, SOLUTION INTRAVENOUS; SUBCUTANEOUS at 21:29

## 2017-08-16 RX ADMIN — HEPARIN SODIUM 5000 UNITS: 5000 INJECTION, SOLUTION INTRAVENOUS; SUBCUTANEOUS at 15:30

## 2017-08-16 RX ADMIN — POLYETHYLENE GLYCOL 3350 17 G: 17 POWDER, FOR SOLUTION ORAL at 08:08

## 2017-08-16 RX ADMIN — Medication 1 TABLET: at 08:10

## 2017-08-16 RX ADMIN — ACETAMINOPHEN 650 MG: 325 TABLET ORAL at 05:05

## 2017-08-16 RX ADMIN — ACETAMINOPHEN 650 MG: 325 TABLET ORAL at 18:23

## 2017-08-16 RX ADMIN — Medication 1 TABLET: at 16:20

## 2017-08-16 RX ADMIN — VITAMIN D, TAB 1000IU (100/BT) 1000 UNITS: 25 TAB at 08:10

## 2017-08-16 RX ADMIN — LIDOCAINE 2 PATCH: 50 PATCH CUTANEOUS at 12:32

## 2017-08-16 RX ADMIN — HEPARIN SODIUM 5000 UNITS: 5000 INJECTION, SOLUTION INTRAVENOUS; SUBCUTANEOUS at 05:05

## 2017-08-16 RX ADMIN — DOCUSATE SODIUM 100 MG: 100 CAPSULE, LIQUID FILLED ORAL at 18:23

## 2017-08-17 LAB
ANION GAP SERPL CALCULATED.3IONS-SCNC: 6 MMOL/L (ref 4–13)
BASOPHILS # BLD AUTO: 0.05 THOUSANDS/ΜL (ref 0–0.1)
BASOPHILS NFR BLD AUTO: 1 % (ref 0–1)
BUN SERPL-MCNC: 13 MG/DL (ref 5–25)
CALCIUM SERPL-MCNC: 8.8 MG/DL (ref 8.3–10.1)
CHLORIDE SERPL-SCNC: 102 MMOL/L (ref 100–108)
CO2 SERPL-SCNC: 27 MMOL/L (ref 21–32)
CREAT SERPL-MCNC: 0.55 MG/DL (ref 0.6–1.3)
EOSINOPHIL # BLD AUTO: 0.17 THOUSAND/ΜL (ref 0–0.61)
EOSINOPHIL NFR BLD AUTO: 2 % (ref 0–6)
ERYTHROCYTE [DISTWIDTH] IN BLOOD BY AUTOMATED COUNT: 13.9 % (ref 11.6–15.1)
GFR SERPL CREATININE-BSD FRML MDRD: 93 ML/MIN/1.73SQ M
GLUCOSE SERPL-MCNC: 82 MG/DL (ref 65–140)
HCT VFR BLD AUTO: 36.4 % (ref 34.8–46.1)
HGB BLD-MCNC: 12.4 G/DL (ref 11.5–15.4)
LYMPHOCYTES # BLD AUTO: 2.87 THOUSANDS/ΜL (ref 0.6–4.47)
LYMPHOCYTES NFR BLD AUTO: 33 % (ref 14–44)
MCH RBC QN AUTO: 30.8 PG (ref 26.8–34.3)
MCHC RBC AUTO-ENTMCNC: 34.1 G/DL (ref 31.4–37.4)
MCV RBC AUTO: 90 FL (ref 82–98)
MONOCYTES # BLD AUTO: 0.79 THOUSAND/ΜL (ref 0.17–1.22)
MONOCYTES NFR BLD AUTO: 9 % (ref 4–12)
NEUTROPHILS # BLD AUTO: 4.7 THOUSANDS/ΜL (ref 1.85–7.62)
NEUTS SEG NFR BLD AUTO: 55 % (ref 43–75)
NRBC BLD AUTO-RTO: 0 /100 WBCS
PLATELET # BLD AUTO: 366 THOUSANDS/UL (ref 149–390)
PMV BLD AUTO: 8.7 FL (ref 8.9–12.7)
POTASSIUM SERPL-SCNC: 4.1 MMOL/L (ref 3.5–5.3)
RBC # BLD AUTO: 4.03 MILLION/UL (ref 3.81–5.12)
SODIUM SERPL-SCNC: 135 MMOL/L (ref 136–145)
WBC # BLD AUTO: 8.61 THOUSAND/UL (ref 4.31–10.16)

## 2017-08-17 PROCEDURE — 92526 ORAL FUNCTION THERAPY: CPT

## 2017-08-17 PROCEDURE — 97116 GAIT TRAINING THERAPY: CPT

## 2017-08-17 PROCEDURE — 80048 BASIC METABOLIC PNL TOTAL CA: CPT | Performed by: PHYSICIAN ASSISTANT

## 2017-08-17 PROCEDURE — 97112 NEUROMUSCULAR REEDUCATION: CPT

## 2017-08-17 PROCEDURE — 97530 THERAPEUTIC ACTIVITIES: CPT

## 2017-08-17 PROCEDURE — 97110 THERAPEUTIC EXERCISES: CPT

## 2017-08-17 PROCEDURE — 0HBRXZZ EXCISION OF TOE NAIL, EXTERNAL APPROACH: ICD-10-PCS | Performed by: PODIATRIST

## 2017-08-17 PROCEDURE — 85025 COMPLETE CBC W/AUTO DIFF WBC: CPT | Performed by: PHYSICIAN ASSISTANT

## 2017-08-17 PROCEDURE — 97535 SELF CARE MNGMENT TRAINING: CPT

## 2017-08-17 RX ORDER — LIDOCAINE 50 MG/G
2 PATCH TOPICAL DAILY
Status: DISCONTINUED | OUTPATIENT
Start: 2017-08-17 | End: 2017-08-19

## 2017-08-17 RX ADMIN — ACETAMINOPHEN 650 MG: 325 TABLET ORAL at 05:45

## 2017-08-17 RX ADMIN — DOCUSATE SODIUM 100 MG: 100 CAPSULE, LIQUID FILLED ORAL at 17:58

## 2017-08-17 RX ADMIN — HEPARIN SODIUM 5000 UNITS: 5000 INJECTION, SOLUTION INTRAVENOUS; SUBCUTANEOUS at 21:37

## 2017-08-17 RX ADMIN — Medication 1 TABLET: at 16:36

## 2017-08-17 RX ADMIN — DOCUSATE SODIUM 100 MG: 100 CAPSULE, LIQUID FILLED ORAL at 09:46

## 2017-08-17 RX ADMIN — LIDOCAINE 2 PATCH: 50 PATCH CUTANEOUS at 19:36

## 2017-08-17 RX ADMIN — HEPARIN SODIUM 5000 UNITS: 5000 INJECTION, SOLUTION INTRAVENOUS; SUBCUTANEOUS at 13:37

## 2017-08-17 RX ADMIN — ACETAMINOPHEN 650 MG: 325 TABLET ORAL at 00:32

## 2017-08-17 RX ADMIN — Medication 1 TABLET: at 09:46

## 2017-08-17 RX ADMIN — ATORVASTATIN CALCIUM 10 MG: 10 TABLET, FILM COATED ORAL at 16:36

## 2017-08-17 RX ADMIN — ACETAMINOPHEN 650 MG: 325 TABLET ORAL at 11:59

## 2017-08-17 RX ADMIN — HEPARIN SODIUM 5000 UNITS: 5000 INJECTION, SOLUTION INTRAVENOUS; SUBCUTANEOUS at 05:45

## 2017-08-17 RX ADMIN — ACETAMINOPHEN 650 MG: 325 TABLET ORAL at 17:58

## 2017-08-17 RX ADMIN — ASPIRIN 81 MG: 81 TABLET, COATED ORAL at 09:46

## 2017-08-17 RX ADMIN — VITAMIN D, TAB 1000IU (100/BT) 1000 UNITS: 25 TAB at 09:46

## 2017-08-18 PROCEDURE — 97530 THERAPEUTIC ACTIVITIES: CPT

## 2017-08-18 PROCEDURE — 97535 SELF CARE MNGMENT TRAINING: CPT

## 2017-08-18 PROCEDURE — 97112 NEUROMUSCULAR REEDUCATION: CPT

## 2017-08-18 RX ADMIN — ASPIRIN 81 MG: 81 TABLET, COATED ORAL at 08:47

## 2017-08-18 RX ADMIN — LIDOCAINE 2 PATCH: 50 PATCH CUTANEOUS at 20:31

## 2017-08-18 RX ADMIN — ACETAMINOPHEN 650 MG: 325 TABLET ORAL at 18:07

## 2017-08-18 RX ADMIN — ATORVASTATIN CALCIUM 10 MG: 10 TABLET, FILM COATED ORAL at 16:37

## 2017-08-18 RX ADMIN — ACETAMINOPHEN 650 MG: 325 TABLET ORAL at 12:07

## 2017-08-18 RX ADMIN — Medication 1 TABLET: at 16:37

## 2017-08-18 RX ADMIN — HEPARIN SODIUM 5000 UNITS: 5000 INJECTION, SOLUTION INTRAVENOUS; SUBCUTANEOUS at 13:59

## 2017-08-18 RX ADMIN — Medication 1 TABLET: at 08:46

## 2017-08-18 RX ADMIN — VITAMIN D, TAB 1000IU (100/BT) 1000 UNITS: 25 TAB at 08:46

## 2017-08-18 RX ADMIN — DOCUSATE SODIUM 100 MG: 100 CAPSULE, LIQUID FILLED ORAL at 18:07

## 2017-08-18 RX ADMIN — ACETAMINOPHEN 650 MG: 325 TABLET ORAL at 00:16

## 2017-08-18 RX ADMIN — DOCUSATE SODIUM 100 MG: 100 CAPSULE, LIQUID FILLED ORAL at 08:47

## 2017-08-18 RX ADMIN — ACETAMINOPHEN 650 MG: 325 TABLET ORAL at 23:13

## 2017-08-18 RX ADMIN — ACETAMINOPHEN 650 MG: 325 TABLET ORAL at 05:54

## 2017-08-18 RX ADMIN — HEPARIN SODIUM 5000 UNITS: 5000 INJECTION, SOLUTION INTRAVENOUS; SUBCUTANEOUS at 21:26

## 2017-08-18 RX ADMIN — HEPARIN SODIUM 5000 UNITS: 5000 INJECTION, SOLUTION INTRAVENOUS; SUBCUTANEOUS at 05:53

## 2017-08-19 PROCEDURE — 97112 NEUROMUSCULAR REEDUCATION: CPT

## 2017-08-19 PROCEDURE — 97110 THERAPEUTIC EXERCISES: CPT

## 2017-08-19 PROCEDURE — 97530 THERAPEUTIC ACTIVITIES: CPT

## 2017-08-19 RX ADMIN — HEPARIN SODIUM 5000 UNITS: 5000 INJECTION, SOLUTION INTRAVENOUS; SUBCUTANEOUS at 05:39

## 2017-08-19 RX ADMIN — ACETAMINOPHEN 650 MG: 325 TABLET ORAL at 05:39

## 2017-08-19 RX ADMIN — Medication 1 TABLET: at 17:07

## 2017-08-19 RX ADMIN — ACETAMINOPHEN 650 MG: 325 TABLET ORAL at 11:13

## 2017-08-19 RX ADMIN — DOCUSATE SODIUM 100 MG: 100 CAPSULE, LIQUID FILLED ORAL at 08:07

## 2017-08-19 RX ADMIN — ACETAMINOPHEN 650 MG: 325 TABLET ORAL at 17:07

## 2017-08-19 RX ADMIN — Medication 1 TABLET: at 07:54

## 2017-08-19 RX ADMIN — VITAMIN D, TAB 1000IU (100/BT) 1000 UNITS: 25 TAB at 08:07

## 2017-08-19 RX ADMIN — ASPIRIN 81 MG: 81 TABLET, COATED ORAL at 08:07

## 2017-08-19 RX ADMIN — ATORVASTATIN CALCIUM 10 MG: 10 TABLET, FILM COATED ORAL at 17:07

## 2017-08-19 RX ADMIN — OXYCODONE HYDROCHLORIDE 2.5 MG: 5 TABLET ORAL at 07:54

## 2017-08-19 RX ADMIN — DOCUSATE SODIUM 100 MG: 100 CAPSULE, LIQUID FILLED ORAL at 17:07

## 2017-08-19 RX ADMIN — HEPARIN SODIUM 5000 UNITS: 5000 INJECTION, SOLUTION INTRAVENOUS; SUBCUTANEOUS at 21:29

## 2017-08-19 RX ADMIN — HEPARIN SODIUM 5000 UNITS: 5000 INJECTION, SOLUTION INTRAVENOUS; SUBCUTANEOUS at 13:43

## 2017-08-20 PROCEDURE — 97110 THERAPEUTIC EXERCISES: CPT

## 2017-08-20 PROCEDURE — 97112 NEUROMUSCULAR REEDUCATION: CPT

## 2017-08-20 PROCEDURE — 97535 SELF CARE MNGMENT TRAINING: CPT

## 2017-08-20 PROCEDURE — 97530 THERAPEUTIC ACTIVITIES: CPT

## 2017-08-20 PROCEDURE — 97532 HB COGNITIVE SKILLS DEVELOPMENT: CPT

## 2017-08-20 RX ADMIN — Medication 1 TABLET: at 16:54

## 2017-08-20 RX ADMIN — OXYCODONE HYDROCHLORIDE 2.5 MG: 5 TABLET ORAL at 22:24

## 2017-08-20 RX ADMIN — ACETAMINOPHEN 650 MG: 325 TABLET ORAL at 17:59

## 2017-08-20 RX ADMIN — DOCUSATE SODIUM 100 MG: 100 CAPSULE, LIQUID FILLED ORAL at 17:58

## 2017-08-20 RX ADMIN — VITAMIN D, TAB 1000IU (100/BT) 1000 UNITS: 25 TAB at 08:16

## 2017-08-20 RX ADMIN — ASPIRIN 81 MG: 81 TABLET, COATED ORAL at 08:16

## 2017-08-20 RX ADMIN — HEPARIN SODIUM 5000 UNITS: 5000 INJECTION, SOLUTION INTRAVENOUS; SUBCUTANEOUS at 21:42

## 2017-08-20 RX ADMIN — ACETAMINOPHEN 650 MG: 325 TABLET ORAL at 00:53

## 2017-08-20 RX ADMIN — Medication 1 TABLET: at 08:16

## 2017-08-20 RX ADMIN — HEPARIN SODIUM 5000 UNITS: 5000 INJECTION, SOLUTION INTRAVENOUS; SUBCUTANEOUS at 05:13

## 2017-08-20 RX ADMIN — HEPARIN SODIUM 5000 UNITS: 5000 INJECTION, SOLUTION INTRAVENOUS; SUBCUTANEOUS at 13:36

## 2017-08-20 RX ADMIN — ACETAMINOPHEN 650 MG: 325 TABLET ORAL at 05:14

## 2017-08-20 RX ADMIN — ATORVASTATIN CALCIUM 10 MG: 10 TABLET, FILM COATED ORAL at 16:54

## 2017-08-20 RX ADMIN — ACETAMINOPHEN 650 MG: 325 TABLET ORAL at 11:40

## 2017-08-21 LAB
ANION GAP SERPL CALCULATED.3IONS-SCNC: 8 MMOL/L (ref 4–13)
BASOPHILS # BLD AUTO: 0.05 THOUSANDS/ΜL (ref 0–0.1)
BASOPHILS NFR BLD AUTO: 1 % (ref 0–1)
BUN SERPL-MCNC: 18 MG/DL (ref 5–25)
CALCIUM SERPL-MCNC: 8.8 MG/DL (ref 8.3–10.1)
CHLORIDE SERPL-SCNC: 102 MMOL/L (ref 100–108)
CO2 SERPL-SCNC: 25 MMOL/L (ref 21–32)
CREAT SERPL-MCNC: 0.56 MG/DL (ref 0.6–1.3)
EOSINOPHIL # BLD AUTO: 0.18 THOUSAND/ΜL (ref 0–0.61)
EOSINOPHIL NFR BLD AUTO: 2 % (ref 0–6)
ERYTHROCYTE [DISTWIDTH] IN BLOOD BY AUTOMATED COUNT: 14.3 % (ref 11.6–15.1)
GFR SERPL CREATININE-BSD FRML MDRD: 93 ML/MIN/1.73SQ M
GLUCOSE P FAST SERPL-MCNC: 84 MG/DL (ref 65–99)
GLUCOSE SERPL-MCNC: 84 MG/DL (ref 65–140)
HCT VFR BLD AUTO: 34.6 % (ref 34.8–46.1)
HGB BLD-MCNC: 11.7 G/DL (ref 11.5–15.4)
LYMPHOCYTES # BLD AUTO: 2.33 THOUSANDS/ΜL (ref 0.6–4.47)
LYMPHOCYTES NFR BLD AUTO: 30 % (ref 14–44)
MCH RBC QN AUTO: 30.6 PG (ref 26.8–34.3)
MCHC RBC AUTO-ENTMCNC: 33.8 G/DL (ref 31.4–37.4)
MCV RBC AUTO: 91 FL (ref 82–98)
MONOCYTES # BLD AUTO: 0.74 THOUSAND/ΜL (ref 0.17–1.22)
MONOCYTES NFR BLD AUTO: 9 % (ref 4–12)
NEUTROPHILS # BLD AUTO: 4.57 THOUSANDS/ΜL (ref 1.85–7.62)
NEUTS SEG NFR BLD AUTO: 58 % (ref 43–75)
NRBC BLD AUTO-RTO: 0 /100 WBCS
PLATELET # BLD AUTO: 352 THOUSANDS/UL (ref 149–390)
PMV BLD AUTO: 8.4 FL (ref 8.9–12.7)
POTASSIUM SERPL-SCNC: 4.1 MMOL/L (ref 3.5–5.3)
RBC # BLD AUTO: 3.82 MILLION/UL (ref 3.81–5.12)
SODIUM SERPL-SCNC: 135 MMOL/L (ref 136–145)
WBC # BLD AUTO: 7.9 THOUSAND/UL (ref 4.31–10.16)

## 2017-08-21 PROCEDURE — 85025 COMPLETE CBC W/AUTO DIFF WBC: CPT | Performed by: PHYSICIAN ASSISTANT

## 2017-08-21 PROCEDURE — 97530 THERAPEUTIC ACTIVITIES: CPT

## 2017-08-21 PROCEDURE — 97535 SELF CARE MNGMENT TRAINING: CPT

## 2017-08-21 PROCEDURE — 97532 HB COGNITIVE SKILLS DEVELOPMENT: CPT

## 2017-08-21 PROCEDURE — 80048 BASIC METABOLIC PNL TOTAL CA: CPT | Performed by: PHYSICIAN ASSISTANT

## 2017-08-21 RX ORDER — PANTOPRAZOLE SODIUM 40 MG/1
40 TABLET, DELAYED RELEASE ORAL
Status: DISCONTINUED | OUTPATIENT
Start: 2017-08-21 | End: 2017-08-24 | Stop reason: HOSPADM

## 2017-08-21 RX ADMIN — ACETAMINOPHEN 650 MG: 325 TABLET ORAL at 18:00

## 2017-08-21 RX ADMIN — ASPIRIN 81 MG: 81 TABLET, COATED ORAL at 08:25

## 2017-08-21 RX ADMIN — ACETAMINOPHEN 650 MG: 325 TABLET ORAL at 11:33

## 2017-08-21 RX ADMIN — HEPARIN SODIUM 5000 UNITS: 5000 INJECTION, SOLUTION INTRAVENOUS; SUBCUTANEOUS at 05:14

## 2017-08-21 RX ADMIN — ACETAMINOPHEN 650 MG: 325 TABLET ORAL at 05:14

## 2017-08-21 RX ADMIN — DOCUSATE SODIUM 100 MG: 100 CAPSULE, LIQUID FILLED ORAL at 18:00

## 2017-08-21 RX ADMIN — VITAMIN D, TAB 1000IU (100/BT) 1000 UNITS: 25 TAB at 08:25

## 2017-08-21 RX ADMIN — Medication 1 TABLET: at 17:42

## 2017-08-21 RX ADMIN — ATORVASTATIN CALCIUM 10 MG: 10 TABLET, FILM COATED ORAL at 17:42

## 2017-08-21 RX ADMIN — HEPARIN SODIUM 5000 UNITS: 5000 INJECTION, SOLUTION INTRAVENOUS; SUBCUTANEOUS at 13:09

## 2017-08-21 RX ADMIN — PANTOPRAZOLE SODIUM 40 MG: 40 TABLET, DELAYED RELEASE ORAL at 11:33

## 2017-08-21 RX ADMIN — Medication 1 TABLET: at 08:25

## 2017-08-22 PROCEDURE — 97535 SELF CARE MNGMENT TRAINING: CPT

## 2017-08-22 PROCEDURE — 97112 NEUROMUSCULAR REEDUCATION: CPT

## 2017-08-22 PROCEDURE — 97530 THERAPEUTIC ACTIVITIES: CPT

## 2017-08-22 RX ADMIN — ACETAMINOPHEN 650 MG: 325 TABLET ORAL at 19:35

## 2017-08-22 RX ADMIN — OXYCODONE HYDROCHLORIDE 2.5 MG: 5 TABLET ORAL at 08:37

## 2017-08-22 RX ADMIN — ACETAMINOPHEN 650 MG: 325 TABLET ORAL at 00:47

## 2017-08-22 RX ADMIN — OXYCODONE HYDROCHLORIDE 2.5 MG: 5 TABLET ORAL at 02:35

## 2017-08-22 RX ADMIN — ASPIRIN 81 MG: 81 TABLET, COATED ORAL at 08:27

## 2017-08-22 RX ADMIN — Medication 1 TABLET: at 18:21

## 2017-08-22 RX ADMIN — ACETAMINOPHEN 650 MG: 325 TABLET ORAL at 05:37

## 2017-08-22 RX ADMIN — PANTOPRAZOLE SODIUM 40 MG: 40 TABLET, DELAYED RELEASE ORAL at 05:33

## 2017-08-22 RX ADMIN — VITAMIN D, TAB 1000IU (100/BT) 1000 UNITS: 25 TAB at 08:27

## 2017-08-22 RX ADMIN — ATORVASTATIN CALCIUM 10 MG: 10 TABLET, FILM COATED ORAL at 18:22

## 2017-08-22 RX ADMIN — Medication 1 TABLET: at 08:27

## 2017-08-22 RX ADMIN — ACETAMINOPHEN 650 MG: 325 TABLET ORAL at 14:18

## 2017-08-23 ENCOUNTER — APPOINTMENT (INPATIENT)
Dept: RADIOLOGY | Facility: HOSPITAL | Age: 72
DRG: 057 | End: 2017-08-23
Payer: COMMERCIAL

## 2017-08-23 PROCEDURE — 97110 THERAPEUTIC EXERCISES: CPT

## 2017-08-23 PROCEDURE — 97535 SELF CARE MNGMENT TRAINING: CPT

## 2017-08-23 PROCEDURE — 97116 GAIT TRAINING THERAPY: CPT

## 2017-08-23 PROCEDURE — 70450 CT HEAD/BRAIN W/O DYE: CPT

## 2017-08-23 PROCEDURE — 97530 THERAPEUTIC ACTIVITIES: CPT

## 2017-08-23 RX ORDER — ACETAMINOPHEN 325 MG/1
TABLET ORAL
Status: DISPENSED
Start: 2017-08-23 | End: 2017-08-23

## 2017-08-23 RX ADMIN — OXYCODONE HYDROCHLORIDE 2.5 MG: 5 TABLET ORAL at 19:20

## 2017-08-23 RX ADMIN — ACETAMINOPHEN 650 MG: 325 TABLET ORAL at 17:22

## 2017-08-23 RX ADMIN — ACETAMINOPHEN 650 MG: 325 TABLET ORAL at 11:41

## 2017-08-23 RX ADMIN — VITAMIN D, TAB 1000IU (100/BT) 1000 UNITS: 25 TAB at 08:33

## 2017-08-23 RX ADMIN — DOCUSATE SODIUM 100 MG: 100 CAPSULE, LIQUID FILLED ORAL at 08:33

## 2017-08-23 RX ADMIN — ATORVASTATIN CALCIUM 10 MG: 10 TABLET, FILM COATED ORAL at 16:46

## 2017-08-23 RX ADMIN — PANTOPRAZOLE SODIUM 40 MG: 40 TABLET, DELAYED RELEASE ORAL at 05:34

## 2017-08-23 RX ADMIN — ASPIRIN 81 MG: 81 TABLET, COATED ORAL at 08:33

## 2017-08-23 RX ADMIN — ACETAMINOPHEN 650 MG: 325 TABLET ORAL at 05:33

## 2017-08-23 RX ADMIN — ACETAMINOPHEN 650 MG: 325 TABLET ORAL at 00:10

## 2017-08-23 RX ADMIN — Medication 1 TABLET: at 16:46

## 2017-08-23 RX ADMIN — Medication 1 TABLET: at 08:33

## 2017-08-24 VITALS
HEIGHT: 66 IN | OXYGEN SATURATION: 98 % | DIASTOLIC BLOOD PRESSURE: 56 MMHG | SYSTOLIC BLOOD PRESSURE: 114 MMHG | RESPIRATION RATE: 18 BRPM | WEIGHT: 135.8 LBS | HEART RATE: 70 BPM | BODY MASS INDEX: 21.83 KG/M2 | TEMPERATURE: 97.7 F

## 2017-08-24 RX ORDER — CALCIUM CARBONATE 500(1250)
1 TABLET ORAL 2 TIMES DAILY WITH MEALS
Qty: 60 TABLET | Refills: 0 | Status: SHIPPED | OUTPATIENT
Start: 2017-08-24 | End: 2017-12-14

## 2017-08-24 RX ORDER — PANTOPRAZOLE SODIUM 40 MG/1
40 TABLET, DELAYED RELEASE ORAL
Qty: 30 TABLET | Refills: 0 | Status: SHIPPED | OUTPATIENT
Start: 2017-08-25 | End: 2017-12-14

## 2017-08-24 RX ORDER — ASPIRIN 81 MG/1
81 TABLET ORAL DAILY
Qty: 30 TABLET | Refills: 0 | Status: SHIPPED | OUTPATIENT
Start: 2017-08-25 | End: 2017-12-14

## 2017-08-24 RX ORDER — ATORVASTATIN CALCIUM 10 MG/1
10 TABLET, FILM COATED ORAL
Qty: 30 TABLET | Refills: 0 | Status: SHIPPED | OUTPATIENT
Start: 2017-08-24 | End: 2017-12-14

## 2017-08-24 RX ADMIN — ACETAMINOPHEN 650 MG: 325 TABLET ORAL at 11:53

## 2017-08-24 RX ADMIN — Medication 1 TABLET: at 10:12

## 2017-08-24 RX ADMIN — VITAMIN D, TAB 1000IU (100/BT) 1000 UNITS: 25 TAB at 10:12

## 2017-08-24 RX ADMIN — ASPIRIN 81 MG: 81 TABLET, COATED ORAL at 10:12

## 2017-08-24 RX ADMIN — ACETAMINOPHEN 650 MG: 325 TABLET ORAL at 00:08

## 2017-08-24 RX ADMIN — ACETAMINOPHEN 650 MG: 325 TABLET ORAL at 05:56

## 2017-08-24 RX ADMIN — PANTOPRAZOLE SODIUM 40 MG: 40 TABLET, DELAYED RELEASE ORAL at 05:56

## 2017-08-28 ENCOUNTER — LAB REQUISITION (OUTPATIENT)
Dept: LAB | Facility: HOSPITAL | Age: 72
End: 2017-08-28
Payer: COMMERCIAL

## 2017-08-28 DIAGNOSIS — E87.1 HYPO-OSMOLALITY AND HYPONATREMIA: ICD-10-CM

## 2017-08-28 DIAGNOSIS — R53.81 OTHER MALAISE: ICD-10-CM

## 2017-08-28 LAB
25(OH)D3 SERPL-MCNC: 31.5 NG/ML (ref 30–100)
ANION GAP SERPL CALCULATED.3IONS-SCNC: 6 MMOL/L (ref 4–13)
BUN SERPL-MCNC: 11 MG/DL (ref 5–25)
CALCIUM SERPL-MCNC: 8.7 MG/DL (ref 8.3–10.1)
CHLORIDE SERPL-SCNC: 101 MMOL/L (ref 100–108)
CO2 SERPL-SCNC: 25 MMOL/L (ref 21–32)
CREAT SERPL-MCNC: 0.52 MG/DL (ref 0.6–1.3)
GFR SERPL CREATININE-BSD FRML MDRD: 95 ML/MIN/1.73SQ M
GLUCOSE P FAST SERPL-MCNC: 105 MG/DL (ref 65–99)
POTASSIUM SERPL-SCNC: 3.8 MMOL/L (ref 3.5–5.3)
SODIUM SERPL-SCNC: 132 MMOL/L (ref 136–145)

## 2017-08-28 PROCEDURE — 80048 BASIC METABOLIC PNL TOTAL CA: CPT | Performed by: PHYSICAL MEDICINE & REHABILITATION

## 2017-08-28 PROCEDURE — 82306 VITAMIN D 25 HYDROXY: CPT | Performed by: PHYSICAL MEDICINE & REHABILITATION

## 2017-08-31 ENCOUNTER — GENERIC CONVERSION - ENCOUNTER (OUTPATIENT)
Dept: OTHER | Facility: OTHER | Age: 72
End: 2017-08-31

## 2017-09-26 ENCOUNTER — APPOINTMENT (EMERGENCY)
Dept: CT IMAGING | Facility: HOSPITAL | Age: 72
End: 2017-09-26
Payer: COMMERCIAL

## 2017-09-26 ENCOUNTER — APPOINTMENT (EMERGENCY)
Dept: RADIOLOGY | Facility: HOSPITAL | Age: 72
End: 2017-09-26
Payer: COMMERCIAL

## 2017-09-26 ENCOUNTER — HOSPITAL ENCOUNTER (EMERGENCY)
Facility: HOSPITAL | Age: 72
Discharge: LEFT BEFORE DISCHARGE | End: 2017-09-26
Attending: EMERGENCY MEDICINE
Payer: COMMERCIAL

## 2017-09-26 VITALS
BODY MASS INDEX: 21.68 KG/M2 | SYSTOLIC BLOOD PRESSURE: 133 MMHG | HEART RATE: 83 BPM | DIASTOLIC BLOOD PRESSURE: 96 MMHG | WEIGHT: 134.92 LBS | OXYGEN SATURATION: 96 % | HEIGHT: 66 IN | RESPIRATION RATE: 21 BRPM | TEMPERATURE: 98.6 F

## 2017-09-26 DIAGNOSIS — G96.08 SUBDURAL HYGROMA: Primary | ICD-10-CM

## 2017-09-26 DIAGNOSIS — Z53.29 LEFT AGAINST MEDICAL ADVICE: ICD-10-CM

## 2017-09-26 PROCEDURE — 73502 X-RAY EXAM HIP UNI 2-3 VIEWS: CPT

## 2017-09-26 PROCEDURE — 99284 EMERGENCY DEPT VISIT MOD MDM: CPT

## 2017-09-26 PROCEDURE — 93005 ELECTROCARDIOGRAM TRACING: CPT | Performed by: EMERGENCY MEDICINE

## 2017-09-26 PROCEDURE — 72125 CT NECK SPINE W/O DYE: CPT

## 2017-09-26 PROCEDURE — 72131 CT LUMBAR SPINE W/O DYE: CPT

## 2017-09-26 PROCEDURE — 72170 X-RAY EXAM OF PELVIS: CPT

## 2017-09-26 PROCEDURE — 70450 CT HEAD/BRAIN W/O DYE: CPT

## 2017-09-26 PROCEDURE — 96374 THER/PROPH/DIAG INJ IV PUSH: CPT

## 2017-09-26 PROCEDURE — 72128 CT CHEST SPINE W/O DYE: CPT

## 2017-09-26 RX ORDER — KETOROLAC TROMETHAMINE 30 MG/ML
15 INJECTION, SOLUTION INTRAMUSCULAR; INTRAVENOUS ONCE
Status: COMPLETED | OUTPATIENT
Start: 2017-09-26 | End: 2017-09-26

## 2017-09-26 RX ADMIN — KETOROLAC TROMETHAMINE 15 MG: 30 INJECTION, SOLUTION INTRAMUSCULAR at 15:28

## 2017-09-28 LAB
ATRIAL RATE: 93 BPM
P AXIS: 84 DEGREES
PR INTERVAL: 158 MS
QRS AXIS: -19 DEGREES
QRSD INTERVAL: 82 MS
QT INTERVAL: 358 MS
QTC INTERVAL: 445 MS
T WAVE AXIS: 72 DEGREES
VENTRICULAR RATE: 93 BPM

## 2017-12-14 ENCOUNTER — HOSPITAL ENCOUNTER (INPATIENT)
Facility: HOSPITAL | Age: 72
LOS: 5 days | Discharge: NURSING, REHAB OR LONG-TERM CARE | DRG: 025 | End: 2017-12-20
Attending: SURGERY | Admitting: SURGERY
Payer: COMMERCIAL

## 2017-12-14 ENCOUNTER — APPOINTMENT (EMERGENCY)
Dept: CT IMAGING | Facility: HOSPITAL | Age: 72
End: 2017-12-14
Payer: COMMERCIAL

## 2017-12-14 ENCOUNTER — HOSPITAL ENCOUNTER (EMERGENCY)
Facility: HOSPITAL | Age: 72
End: 2017-12-14
Attending: EMERGENCY MEDICINE | Admitting: EMERGENCY MEDICINE
Payer: COMMERCIAL

## 2017-12-14 ENCOUNTER — APPOINTMENT (EMERGENCY)
Dept: RADIOLOGY | Facility: HOSPITAL | Age: 72
End: 2017-12-14
Payer: COMMERCIAL

## 2017-12-14 VITALS
DIASTOLIC BLOOD PRESSURE: 72 MMHG | RESPIRATION RATE: 18 BRPM | TEMPERATURE: 97.7 F | BODY MASS INDEX: 20.98 KG/M2 | OXYGEN SATURATION: 96 % | SYSTOLIC BLOOD PRESSURE: 154 MMHG | HEART RATE: 83 BPM | WEIGHT: 130 LBS

## 2017-12-14 DIAGNOSIS — I62.01 ACUTE ON CHRONIC INTRACRANIAL SUBDURAL HEMATOMA (HCC): Primary | ICD-10-CM

## 2017-12-14 DIAGNOSIS — I62.03 ACUTE ON CHRONIC INTRACRANIAL SUBDURAL HEMATOMA (HCC): Primary | ICD-10-CM

## 2017-12-14 DIAGNOSIS — R29.898 WEAKNESS OF EXTREMITY: ICD-10-CM

## 2017-12-14 DIAGNOSIS — I60.9 SAH (SUBARACHNOID HEMORRHAGE) (HCC): Primary | ICD-10-CM

## 2017-12-14 DIAGNOSIS — S06.5X9A SUBDURAL HEMATOMA (HCC): ICD-10-CM
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
## 2017-12-14 DIAGNOSIS — M79.18 ACUTE BUTTOCK PAIN: ICD-10-CM

## 2017-12-14 DIAGNOSIS — R47.1 DYSARTHRIA: ICD-10-CM

## 2017-12-14 DIAGNOSIS — R41.0 DELIRIUM: ICD-10-CM

## 2017-12-14 LAB
ABO GROUP BLD: NORMAL
ANION GAP BLD CALC-SCNC: 17 MMOL/L (ref 4–13)
ANION GAP SERPL CALCULATED.3IONS-SCNC: 8 MMOL/L (ref 4–13)
APTT PPP: 29 SECONDS (ref 23–35)
BILIRUB UR QL STRIP: NEGATIVE
BLD GP AB SCN SERPL QL: NEGATIVE
BUN BLD-MCNC: 12 MG/DL (ref 5–25)
BUN SERPL-MCNC: 12 MG/DL (ref 5–25)
CA-I BLD-SCNC: 1.18 MMOL/L (ref 1.12–1.32)
CALCIUM SERPL-MCNC: 8.9 MG/DL (ref 8.3–10.1)
CHLORIDE BLD-SCNC: 101 MMOL/L (ref 100–108)
CHLORIDE SERPL-SCNC: 103 MMOL/L (ref 100–108)
CLARITY UR: CLEAR
CO2 SERPL-SCNC: 28 MMOL/L (ref 21–32)
COLOR UR: YELLOW
CREAT BLD-MCNC: 0.6 MG/DL (ref 0.6–1.3)
CREAT SERPL-MCNC: 0.72 MG/DL (ref 0.6–1.3)
ERYTHROCYTE [DISTWIDTH] IN BLOOD BY AUTOMATED COUNT: 13.1 % (ref 11.6–15.1)
GFR SERPL CREATININE-BSD FRML MDRD: 83 ML/MIN/1.73SQ M
GFR SERPL CREATININE-BSD FRML MDRD: 91 ML/MIN/1.73SQ M
GLUCOSE SERPL-MCNC: 86 MG/DL (ref 65–140)
GLUCOSE SERPL-MCNC: 90 MG/DL (ref 65–140)
GLUCOSE UR STRIP-MCNC: NEGATIVE MG/DL
HCT VFR BLD AUTO: 38.2 % (ref 34.8–46.1)
HCT VFR BLD CALC: 37 % (ref 34.8–46.1)
HGB BLD-MCNC: 12.9 G/DL (ref 11.5–15.4)
HGB BLDA-MCNC: 12.6 G/DL (ref 11.5–15.4)
HGB UR QL STRIP.AUTO: NEGATIVE
INR PPP: 1.01 (ref 0.86–1.16)
KETONES UR STRIP-MCNC: ABNORMAL MG/DL
LEUKOCYTE ESTERASE UR QL STRIP: NEGATIVE
MCH RBC QN AUTO: 30.4 PG (ref 26.8–34.3)
MCHC RBC AUTO-ENTMCNC: 33.8 G/DL (ref 31.4–37.4)
MCV RBC AUTO: 90 FL (ref 82–98)
NITRITE UR QL STRIP: NEGATIVE
PCO2 BLD: 25 MMOL/L (ref 21–32)
PH UR STRIP.AUTO: 6 [PH] (ref 4.5–8)
PLATELET # BLD AUTO: 383 THOUSANDS/UL (ref 149–390)
PMV BLD AUTO: 9 FL (ref 8.9–12.7)
POTASSIUM BLD-SCNC: 3.9 MMOL/L (ref 3.5–5.3)
POTASSIUM SERPL-SCNC: 3.9 MMOL/L (ref 3.5–5.3)
PROT UR STRIP-MCNC: NEGATIVE MG/DL
PROTHROMBIN TIME: 13.1 SECONDS (ref 12.1–14.4)
RBC # BLD AUTO: 4.24 MILLION/UL (ref 3.81–5.12)
RH BLD: NEGATIVE
SODIUM BLD-SCNC: 138 MMOL/L (ref 136–145)
SODIUM SERPL-SCNC: 139 MMOL/L (ref 136–145)
SP GR UR STRIP.AUTO: 1.01 (ref 1–1.03)
SPECIMEN EXPIRATION DATE: NORMAL
SPECIMEN SOURCE: ABNORMAL
TROPONIN I SERPL-MCNC: <0.02 NG/ML
UROBILINOGEN UR QL STRIP.AUTO: 0.2 E.U./DL
WBC # BLD AUTO: 7.63 THOUSAND/UL (ref 4.31–10.16)

## 2017-12-14 PROCEDURE — 96360 HYDRATION IV INFUSION INIT: CPT

## 2017-12-14 PROCEDURE — 86900 BLOOD TYPING SEROLOGIC ABO: CPT | Performed by: EMERGENCY MEDICINE

## 2017-12-14 PROCEDURE — 36415 COLL VENOUS BLD VENIPUNCTURE: CPT | Performed by: EMERGENCY MEDICINE

## 2017-12-14 PROCEDURE — 81003 URINALYSIS AUTO W/O SCOPE: CPT | Performed by: EMERGENCY MEDICINE

## 2017-12-14 PROCEDURE — 86850 RBC ANTIBODY SCREEN: CPT | Performed by: EMERGENCY MEDICINE

## 2017-12-14 PROCEDURE — 84484 ASSAY OF TROPONIN QUANT: CPT | Performed by: EMERGENCY MEDICINE

## 2017-12-14 PROCEDURE — 80048 BASIC METABOLIC PNL TOTAL CA: CPT | Performed by: EMERGENCY MEDICINE

## 2017-12-14 PROCEDURE — 70450 CT HEAD/BRAIN W/O DYE: CPT

## 2017-12-14 PROCEDURE — 93005 ELECTROCARDIOGRAM TRACING: CPT

## 2017-12-14 PROCEDURE — 93005 ELECTROCARDIOGRAM TRACING: CPT | Performed by: EMERGENCY MEDICINE

## 2017-12-14 PROCEDURE — 71010 HB CHEST X-RAY 1 VIEW FRONTAL (PORTABLE): CPT

## 2017-12-14 PROCEDURE — 99285 EMERGENCY DEPT VISIT HI MDM: CPT

## 2017-12-14 PROCEDURE — 86901 BLOOD TYPING SEROLOGIC RH(D): CPT | Performed by: EMERGENCY MEDICINE

## 2017-12-14 PROCEDURE — 85014 HEMATOCRIT: CPT

## 2017-12-14 PROCEDURE — 85610 PROTHROMBIN TIME: CPT | Performed by: EMERGENCY MEDICINE

## 2017-12-14 PROCEDURE — 72131 CT LUMBAR SPINE W/O DYE: CPT

## 2017-12-14 PROCEDURE — 85730 THROMBOPLASTIN TIME PARTIAL: CPT | Performed by: EMERGENCY MEDICINE

## 2017-12-14 PROCEDURE — 80047 BASIC METABLC PNL IONIZED CA: CPT

## 2017-12-14 PROCEDURE — 85027 COMPLETE CBC AUTOMATED: CPT | Performed by: EMERGENCY MEDICINE

## 2017-12-14 RX ORDER — ASCORBIC ACID 125 MG
TABLET,CHEWABLE ORAL DAILY
COMMUNITY
End: 2017-12-20 | Stop reason: HOSPADM

## 2017-12-14 RX ORDER — PSEUDOEPHEDRINE HYDROCHLORIDE 30 MG/1
60 TABLET ORAL EVERY 4 HOURS PRN
COMMUNITY
End: 2017-12-20 | Stop reason: HOSPADM

## 2017-12-14 RX ADMIN — SODIUM CHLORIDE 1000 ML: 0.9 INJECTION, SOLUTION INTRAVENOUS at 21:19

## 2017-12-15 ENCOUNTER — ANESTHESIA EVENT (INPATIENT)
Dept: PERIOP | Facility: HOSPITAL | Age: 72
DRG: 025 | End: 2017-12-15
Payer: COMMERCIAL

## 2017-12-15 ENCOUNTER — APPOINTMENT (INPATIENT)
Dept: RADIOLOGY | Facility: HOSPITAL | Age: 72
DRG: 025 | End: 2017-12-15
Payer: COMMERCIAL

## 2017-12-15 ENCOUNTER — ANESTHESIA (INPATIENT)
Dept: PERIOP | Facility: HOSPITAL | Age: 72
DRG: 025 | End: 2017-12-15
Payer: COMMERCIAL

## 2017-12-15 PROBLEM — S06.5X9A SUBDURAL HEMATOMA (HCC): Status: ACTIVE | Noted: 2017-12-15

## 2017-12-15 PROBLEM — K21.9 GERD (GASTROESOPHAGEAL REFLUX DISEASE): Chronic | Status: ACTIVE | Noted: 2017-12-15

## 2017-12-15 LAB
ABO GROUP BLD: NORMAL
ANION GAP SERPL CALCULATED.3IONS-SCNC: 7 MMOL/L (ref 4–13)
APTT PPP: 29 SECONDS (ref 23–35)
ATRIAL RATE: 76 BPM
BASOPHILS # BLD AUTO: 0.04 THOUSANDS/ΜL (ref 0–0.1)
BASOPHILS NFR BLD AUTO: 1 % (ref 0–1)
BLD GP AB SCN SERPL QL: NEGATIVE
BUN SERPL-MCNC: 9 MG/DL (ref 5–25)
CALCIUM SERPL-MCNC: 6.7 MG/DL (ref 8.3–10.1)
CHLORIDE SERPL-SCNC: 116 MMOL/L (ref 100–108)
CO2 SERPL-SCNC: 21 MMOL/L (ref 21–32)
CREAT SERPL-MCNC: 0.31 MG/DL (ref 0.6–1.3)
EOSINOPHIL # BLD AUTO: 0.16 THOUSAND/ΜL (ref 0–0.61)
EOSINOPHIL NFR BLD AUTO: 2 % (ref 0–6)
ERYTHROCYTE [DISTWIDTH] IN BLOOD BY AUTOMATED COUNT: 13.1 % (ref 11.6–15.1)
EST. AVERAGE GLUCOSE BLD GHB EST-MCNC: 108 MG/DL
GFR SERPL CREATININE-BSD FRML MDRD: 113 ML/MIN/1.73SQ M
GLUCOSE SERPL-MCNC: 103 MG/DL (ref 65–140)
GLUCOSE SERPL-MCNC: 68 MG/DL (ref 65–140)
GLUCOSE SERPL-MCNC: 75 MG/DL (ref 65–140)
HBA1C MFR BLD: 5.4 % (ref 4.2–6.3)
HCT VFR BLD AUTO: 30.8 % (ref 34.8–46.1)
HCT VFR BLD AUTO: 35.8 % (ref 34.8–46.1)
HGB BLD-MCNC: 10.6 G/DL (ref 11.5–15.4)
HGB BLD-MCNC: 12.4 G/DL (ref 11.5–15.4)
INR PPP: 1.05 (ref 0.86–1.16)
INR PPP: 1.2 (ref 0.86–1.16)
LYMPHOCYTES # BLD AUTO: 1.83 THOUSANDS/ΜL (ref 0.6–4.47)
LYMPHOCYTES NFR BLD AUTO: 27 % (ref 14–44)
MCH RBC QN AUTO: 31 PG (ref 26.8–34.3)
MCHC RBC AUTO-ENTMCNC: 34.4 G/DL (ref 31.4–37.4)
MCV RBC AUTO: 90 FL (ref 82–98)
MONOCYTES # BLD AUTO: 0.6 THOUSAND/ΜL (ref 0.17–1.22)
MONOCYTES NFR BLD AUTO: 9 % (ref 4–12)
NEUTROPHILS # BLD AUTO: 4.18 THOUSANDS/ΜL (ref 1.85–7.62)
NEUTS SEG NFR BLD AUTO: 61 % (ref 43–75)
NRBC BLD AUTO-RTO: 0 /100 WBCS
P AXIS: 81 DEGREES
PLATELET # BLD AUTO: 284 THOUSANDS/UL (ref 149–390)
PMV BLD AUTO: 8.7 FL (ref 8.9–12.7)
POTASSIUM SERPL-SCNC: 3.5 MMOL/L (ref 3.5–5.3)
PR INTERVAL: 154 MS
PROTHROMBIN TIME: 13.7 SECONDS (ref 12.1–14.4)
PROTHROMBIN TIME: 15.3 SECONDS (ref 12.1–14.4)
QRS AXIS: -39 DEGREES
QRSD INTERVAL: 76 MS
QT INTERVAL: 394 MS
QTC INTERVAL: 443 MS
RBC # BLD AUTO: 3.42 MILLION/UL (ref 3.81–5.12)
RH BLD: NEGATIVE
SODIUM SERPL-SCNC: 144 MMOL/L (ref 136–145)
SPECIMEN EXPIRATION DATE: NORMAL
T WAVE AXIS: 72 DEGREES
VENTRICULAR RATE: 76 BPM
WBC # BLD AUTO: 6.83 THOUSAND/UL (ref 4.31–10.16)

## 2017-12-15 PROCEDURE — 86900 BLOOD TYPING SEROLOGIC ABO: CPT | Performed by: NEUROLOGICAL SURGERY

## 2017-12-15 PROCEDURE — 86923 COMPATIBILITY TEST ELECTRIC: CPT

## 2017-12-15 PROCEDURE — 30233K1 TRANSFUSION OF NONAUTOLOGOUS FROZEN PLASMA INTO PERIPHERAL VEIN, PERCUTANEOUS APPROACH: ICD-10-PCS | Performed by: EMERGENCY MEDICINE

## 2017-12-15 PROCEDURE — C1713 ANCHOR/SCREW BN/BN,TIS/BN: HCPCS | Performed by: NEUROLOGICAL SURGERY

## 2017-12-15 PROCEDURE — 99285 EMERGENCY DEPT VISIT HI MDM: CPT

## 2017-12-15 PROCEDURE — 87081 CULTURE SCREEN ONLY: CPT | Performed by: PHYSICIAN ASSISTANT

## 2017-12-15 PROCEDURE — 80048 BASIC METABOLIC PNL TOTAL CA: CPT | Performed by: EMERGENCY MEDICINE

## 2017-12-15 PROCEDURE — 80048 BASIC METABOLIC PNL TOTAL CA: CPT | Performed by: NEUROLOGICAL SURGERY

## 2017-12-15 PROCEDURE — 85610 PROTHROMBIN TIME: CPT | Performed by: EMERGENCY MEDICINE

## 2017-12-15 PROCEDURE — P9017 PLASMA 1 DONOR FRZ W/IN 8 HR: HCPCS

## 2017-12-15 PROCEDURE — 85610 PROTHROMBIN TIME: CPT | Performed by: PHYSICIAN ASSISTANT

## 2017-12-15 PROCEDURE — 82948 REAGENT STRIP/BLOOD GLUCOSE: CPT

## 2017-12-15 PROCEDURE — 00C40ZZ EXTIRPATION OF MATTER FROM INTRACRANIAL SUBDURAL SPACE, OPEN APPROACH: ICD-10-PCS | Performed by: NEUROLOGICAL SURGERY

## 2017-12-15 PROCEDURE — 00D10ZZ EXTRACTION OF CEREBRAL MENINGES, OPEN APPROACH: ICD-10-PCS | Performed by: NEUROLOGICAL SURGERY

## 2017-12-15 PROCEDURE — 88304 TISSUE EXAM BY PATHOLOGIST: CPT | Performed by: NEUROLOGICAL SURGERY

## 2017-12-15 PROCEDURE — 86901 BLOOD TYPING SEROLOGIC RH(D): CPT | Performed by: NEUROLOGICAL SURGERY

## 2017-12-15 PROCEDURE — C1765 ADHESION BARRIER: HCPCS | Performed by: NEUROLOGICAL SURGERY

## 2017-12-15 PROCEDURE — 70450 CT HEAD/BRAIN W/O DYE: CPT

## 2017-12-15 PROCEDURE — 85014 HEMATOCRIT: CPT | Performed by: PHYSICIAN ASSISTANT

## 2017-12-15 PROCEDURE — 85025 COMPLETE CBC W/AUTO DIFF WBC: CPT | Performed by: EMERGENCY MEDICINE

## 2017-12-15 PROCEDURE — 83036 HEMOGLOBIN GLYCOSYLATED A1C: CPT | Performed by: PHYSICIAN ASSISTANT

## 2017-12-15 PROCEDURE — 85018 HEMOGLOBIN: CPT | Performed by: PHYSICIAN ASSISTANT

## 2017-12-15 PROCEDURE — 85730 THROMBOPLASTIN TIME PARTIAL: CPT | Performed by: PHYSICIAN ASSISTANT

## 2017-12-15 PROCEDURE — 86850 RBC ANTIBODY SCREEN: CPT | Performed by: NEUROLOGICAL SURGERY

## 2017-12-15 PROCEDURE — 86927 PLASMA FRESH FROZEN: CPT

## 2017-12-15 DEVICE — IMPLANTABLE DEVICE
Type: IMPLANTABLE DEVICE | Site: FRONTAL LOBE | Status: FUNCTIONAL
Brand: THINFLAP

## 2017-12-15 DEVICE — ICP CATH BACTISEAL EVD LRG 1.9MM X 35CM: Type: IMPLANTABLE DEVICE | Site: FRONTAL LOBE | Status: FUNCTIONAL

## 2017-12-15 DEVICE — IMPLANTABLE DEVICE
Type: IMPLANTABLE DEVICE | Site: FRONTAL LOBE | Status: NON-FUNCTIONAL
Brand: THINFLAP SYSTEM
Removed: 2018-01-24

## 2017-12-15 RX ORDER — SODIUM CHLORIDE 9 MG/ML
75 INJECTION, SOLUTION INTRAVENOUS CONTINUOUS
Status: DISCONTINUED | OUTPATIENT
Start: 2017-12-15 | End: 2017-12-16

## 2017-12-15 RX ORDER — ALBUTEROL SULFATE 2.5 MG/3ML
2.5 SOLUTION RESPIRATORY (INHALATION) ONCE AS NEEDED
Status: COMPLETED | OUTPATIENT
Start: 2017-12-15 | End: 2017-12-15

## 2017-12-15 RX ORDER — BISACODYL 10 MG
10 SUPPOSITORY, RECTAL RECTAL DAILY PRN
Status: DISCONTINUED | OUTPATIENT
Start: 2017-12-15 | End: 2017-12-20 | Stop reason: HOSPADM

## 2017-12-15 RX ORDER — MAGNESIUM HYDROXIDE 1200 MG/15ML
LIQUID ORAL AS NEEDED
Status: DISCONTINUED | OUTPATIENT
Start: 2017-12-15 | End: 2017-12-15 | Stop reason: HOSPADM

## 2017-12-15 RX ORDER — LEVOTHYROXINE SODIUM 0.03 MG/1
25 TABLET ORAL
Status: DISCONTINUED | OUTPATIENT
Start: 2017-12-16 | End: 2017-12-20 | Stop reason: HOSPADM

## 2017-12-15 RX ORDER — FENTANYL CITRATE/PF 50 MCG/ML
12.5 SYRINGE (ML) INJECTION
Status: DISCONTINUED | OUTPATIENT
Start: 2017-12-15 | End: 2017-12-15 | Stop reason: HOSPADM

## 2017-12-15 RX ORDER — HALOPERIDOL 5 MG/ML
2 INJECTION INTRAMUSCULAR ONCE
Status: COMPLETED | OUTPATIENT
Start: 2017-12-15 | End: 2017-12-15

## 2017-12-15 RX ORDER — GLYCOPYRROLATE 0.2 MG/ML
INJECTION INTRAMUSCULAR; INTRAVENOUS AS NEEDED
Status: DISCONTINUED | OUTPATIENT
Start: 2017-12-15 | End: 2017-12-15 | Stop reason: SURG

## 2017-12-15 RX ORDER — ONDANSETRON 2 MG/ML
4 INJECTION INTRAMUSCULAR; INTRAVENOUS EVERY 4 HOURS PRN
Status: DISCONTINUED | OUTPATIENT
Start: 2017-12-15 | End: 2017-12-18

## 2017-12-15 RX ORDER — SUCCINYLCHOLINE CHLORIDE 20 MG/ML
INJECTION INTRAMUSCULAR; INTRAVENOUS AS NEEDED
Status: DISCONTINUED | OUTPATIENT
Start: 2017-12-15 | End: 2017-12-15 | Stop reason: SURG

## 2017-12-15 RX ORDER — LEVETIRACETAM 500 MG/1
500 TABLET ORAL EVERY 12 HOURS SCHEDULED
Status: DISCONTINUED | OUTPATIENT
Start: 2017-12-15 | End: 2017-12-15

## 2017-12-15 RX ORDER — ONDANSETRON 2 MG/ML
4 INJECTION INTRAMUSCULAR; INTRAVENOUS ONCE AS NEEDED
Status: DISCONTINUED | OUTPATIENT
Start: 2017-12-15 | End: 2017-12-15 | Stop reason: HOSPADM

## 2017-12-15 RX ORDER — EPHEDRINE SULFATE 50 MG/ML
INJECTION, SOLUTION INTRAVENOUS AS NEEDED
Status: DISCONTINUED | OUTPATIENT
Start: 2017-12-15 | End: 2017-12-15 | Stop reason: SURG

## 2017-12-15 RX ORDER — CHLORHEXIDINE GLUCONATE 0.12 MG/ML
15 RINSE ORAL EVERY 12 HOURS SCHEDULED
Status: DISCONTINUED | OUTPATIENT
Start: 2017-12-15 | End: 2017-12-16

## 2017-12-15 RX ORDER — PROPOFOL 10 MG/ML
INJECTION, EMULSION INTRAVENOUS AS NEEDED
Status: DISCONTINUED | OUTPATIENT
Start: 2017-12-15 | End: 2017-12-15 | Stop reason: SURG

## 2017-12-15 RX ORDER — ACETAMINOPHEN 325 MG/1
650 TABLET ORAL EVERY 4 HOURS PRN
Status: DISCONTINUED | OUTPATIENT
Start: 2017-12-15 | End: 2017-12-16

## 2017-12-15 RX ORDER — OXYCODONE HYDROCHLORIDE 10 MG/1
10 TABLET ORAL EVERY 4 HOURS PRN
Status: DISCONTINUED | OUTPATIENT
Start: 2017-12-15 | End: 2017-12-16

## 2017-12-15 RX ORDER — PROPOFOL 10 MG/ML
INJECTION, EMULSION INTRAVENOUS CONTINUOUS PRN
Status: DISCONTINUED | OUTPATIENT
Start: 2017-12-15 | End: 2017-12-15 | Stop reason: SURG

## 2017-12-15 RX ORDER — ONDANSETRON 2 MG/ML
INJECTION INTRAMUSCULAR; INTRAVENOUS AS NEEDED
Status: DISCONTINUED | OUTPATIENT
Start: 2017-12-15 | End: 2017-12-15 | Stop reason: SURG

## 2017-12-15 RX ORDER — SODIUM CHLORIDE 9 MG/ML
125 INJECTION, SOLUTION INTRAVENOUS CONTINUOUS
Status: DISCONTINUED | OUTPATIENT
Start: 2017-12-15 | End: 2017-12-16

## 2017-12-15 RX ORDER — FENTANYL CITRATE 50 UG/ML
INJECTION, SOLUTION INTRAMUSCULAR; INTRAVENOUS AS NEEDED
Status: DISCONTINUED | OUTPATIENT
Start: 2017-12-15 | End: 2017-12-15 | Stop reason: SURG

## 2017-12-15 RX ORDER — POTASSIUM CHLORIDE 14.9 MG/ML
INJECTION INTRAVENOUS CONTINUOUS PRN
Status: DISCONTINUED | OUTPATIENT
Start: 2017-12-15 | End: 2017-12-15 | Stop reason: SURG

## 2017-12-15 RX ORDER — VANCOMYCIN HYDROCHLORIDE 1 G/200ML
1000 INJECTION, SOLUTION INTRAVENOUS ONCE
Status: COMPLETED | OUTPATIENT
Start: 2017-12-15 | End: 2017-12-15

## 2017-12-15 RX ORDER — BUPIVACAINE HYDROCHLORIDE AND EPINEPHRINE 5; 5 MG/ML; UG/ML
INJECTION, SOLUTION EPIDURAL; INTRACAUDAL; PERINEURAL AS NEEDED
Status: DISCONTINUED | OUTPATIENT
Start: 2017-12-15 | End: 2017-12-15 | Stop reason: HOSPADM

## 2017-12-15 RX ORDER — VANCOMYCIN HYDROCHLORIDE 1 G/200ML
1000 INJECTION, SOLUTION INTRAVENOUS EVERY 12 HOURS
Status: COMPLETED | OUTPATIENT
Start: 2017-12-16 | End: 2017-12-17

## 2017-12-15 RX ORDER — DOCUSATE SODIUM 100 MG/1
100 CAPSULE, LIQUID FILLED ORAL 2 TIMES DAILY
Status: DISCONTINUED | OUTPATIENT
Start: 2017-12-15 | End: 2017-12-20 | Stop reason: HOSPADM

## 2017-12-15 RX ORDER — FENTANYL CITRATE 50 UG/ML
25 INJECTION, SOLUTION INTRAMUSCULAR; INTRAVENOUS
Status: DISCONTINUED | OUTPATIENT
Start: 2017-12-15 | End: 2017-12-16

## 2017-12-15 RX ORDER — SODIUM CHLORIDE 9 MG/ML
INJECTION, SOLUTION INTRAVENOUS CONTINUOUS PRN
Status: DISCONTINUED | OUTPATIENT
Start: 2017-12-15 | End: 2017-12-15 | Stop reason: SURG

## 2017-12-15 RX ORDER — PHYTONADIONE 5 MG/1
10 TABLET ORAL ONCE
Status: DISCONTINUED | OUTPATIENT
Start: 2017-12-15 | End: 2017-12-15

## 2017-12-15 RX ORDER — LABETALOL HYDROCHLORIDE 5 MG/ML
5 INJECTION, SOLUTION INTRAVENOUS AS NEEDED
Status: DISCONTINUED | OUTPATIENT
Start: 2017-12-15 | End: 2017-12-15 | Stop reason: HOSPADM

## 2017-12-15 RX ORDER — LIDOCAINE HYDROCHLORIDE AND EPINEPHRINE 10; 10 MG/ML; UG/ML
INJECTION, SOLUTION INFILTRATION; PERINEURAL AS NEEDED
Status: DISCONTINUED | OUTPATIENT
Start: 2017-12-15 | End: 2017-12-15 | Stop reason: HOSPADM

## 2017-12-15 RX ORDER — NICOTINE 21 MG/24HR
1 PATCH, TRANSDERMAL 24 HOURS TRANSDERMAL DAILY
Status: DISCONTINUED | OUTPATIENT
Start: 2017-12-15 | End: 2017-12-20 | Stop reason: HOSPADM

## 2017-12-15 RX ORDER — SODIUM CHLORIDE, SODIUM GLUCONATE, SODIUM ACETATE, POTASSIUM CHLORIDE, MAGNESIUM CHLORIDE, SODIUM PHOSPHATE, DIBASIC, AND POTASSIUM PHOSPHATE .53; .5; .37; .037; .03; .012; .00082 G/100ML; G/100ML; G/100ML; G/100ML; G/100ML; G/100ML; G/100ML
75 INJECTION, SOLUTION INTRAVENOUS CONTINUOUS
Status: DISCONTINUED | OUTPATIENT
Start: 2017-12-15 | End: 2017-12-16

## 2017-12-15 RX ADMIN — PROPOFOL 40 MCG/KG/MIN: 10 INJECTION, EMULSION INTRAVENOUS at 16:50

## 2017-12-15 RX ADMIN — SODIUM CHLORIDE, SODIUM GLUCONATE, SODIUM ACETATE, POTASSIUM CHLORIDE, MAGNESIUM CHLORIDE, SODIUM PHOSPHATE, DIBASIC, AND POTASSIUM PHOSPHATE 100 ML: .53; .5; .37; .037; .03; .012; .00082 INJECTION, SOLUTION INTRAVENOUS at 18:15

## 2017-12-15 RX ADMIN — SODIUM CHLORIDE, SODIUM GLUCONATE, SODIUM ACETATE, POTASSIUM CHLORIDE, MAGNESIUM CHLORIDE, SODIUM PHOSPHATE, DIBASIC, AND POTASSIUM PHOSPHATE 100 ML: .53; .5; .37; .037; .03; .012; .00082 INJECTION, SOLUTION INTRAVENOUS at 17:34

## 2017-12-15 RX ADMIN — DEXAMETHASONE SODIUM PHOSPHATE 10 MG: 10 INJECTION INTRAMUSCULAR; INTRAVENOUS at 17:00

## 2017-12-15 RX ADMIN — Medication 500 MG: at 17:00

## 2017-12-15 RX ADMIN — VANCOMYCIN HYDROCHLORIDE 1000 MG: 1 INJECTION, SOLUTION INTRAVENOUS at 16:52

## 2017-12-15 RX ADMIN — FAMOTIDINE 20 MG: 10 INJECTION, SOLUTION INTRAVENOUS at 08:33

## 2017-12-15 RX ADMIN — HALOPERIDOL LACTATE 2 MG: 5 INJECTION, SOLUTION INTRAMUSCULAR at 13:29

## 2017-12-15 RX ADMIN — ALBUTEROL SULFATE 2.5 MG: 2.5 SOLUTION RESPIRATORY (INHALATION) at 18:30

## 2017-12-15 RX ADMIN — PROPOFOL 100 MG: 10 INJECTION, EMULSION INTRAVENOUS at 16:22

## 2017-12-15 RX ADMIN — SODIUM CHLORIDE, SODIUM GLUCONATE, SODIUM ACETATE, POTASSIUM CHLORIDE, MAGNESIUM CHLORIDE, SODIUM PHOSPHATE, DIBASIC, AND POTASSIUM PHOSPHATE 75 ML/HR: .53; .5; .37; .037; .03; .012; .00082 INJECTION, SOLUTION INTRAVENOUS at 07:36

## 2017-12-15 RX ADMIN — SODIUM CHLORIDE: 0.9 INJECTION, SOLUTION INTRAVENOUS at 16:31

## 2017-12-15 RX ADMIN — SODIUM CHLORIDE, SODIUM GLUCONATE, SODIUM ACETATE, POTASSIUM CHLORIDE, MAGNESIUM CHLORIDE, SODIUM PHOSPHATE, DIBASIC, AND POTASSIUM PHOSPHATE 200 ML: .53; .5; .37; .037; .03; .012; .00082 INJECTION, SOLUTION INTRAVENOUS at 16:30

## 2017-12-15 RX ADMIN — PHYTONADIONE 10 MG: 10 INJECTION, EMULSION INTRAMUSCULAR; INTRAVENOUS; SUBCUTANEOUS at 14:17

## 2017-12-15 RX ADMIN — SODIUM CHLORIDE, SODIUM GLUCONATE, SODIUM ACETATE, POTASSIUM CHLORIDE, MAGNESIUM CHLORIDE, SODIUM PHOSPHATE, DIBASIC, AND POTASSIUM PHOSPHATE 100 ML: .53; .5; .37; .037; .03; .012; .00082 INJECTION, SOLUTION INTRAVENOUS at 17:00

## 2017-12-15 RX ADMIN — PHENYLEPHRINE HYDROCHLORIDE 40 MCG/MIN: 10 INJECTION INTRAVENOUS at 16:52

## 2017-12-15 RX ADMIN — SODIUM CHLORIDE, SODIUM GLUCONATE, SODIUM ACETATE, POTASSIUM CHLORIDE, MAGNESIUM CHLORIDE, SODIUM PHOSPHATE, DIBASIC, AND POTASSIUM PHOSPHATE 100 ML: .53; .5; .37; .037; .03; .012; .00082 INJECTION, SOLUTION INTRAVENOUS at 17:40

## 2017-12-15 RX ADMIN — SODIUM CHLORIDE, SODIUM GLUCONATE, SODIUM ACETATE, POTASSIUM CHLORIDE, MAGNESIUM CHLORIDE, SODIUM PHOSPHATE, DIBASIC, AND POTASSIUM PHOSPHATE 100 ML: .53; .5; .37; .037; .03; .012; .00082 INJECTION, SOLUTION INTRAVENOUS at 17:15

## 2017-12-15 RX ADMIN — SODIUM CHLORIDE, SODIUM GLUCONATE, SODIUM ACETATE, POTASSIUM CHLORIDE, MAGNESIUM CHLORIDE, SODIUM PHOSPHATE, DIBASIC, AND POTASSIUM PHOSPHATE 100 ML: .53; .5; .37; .037; .03; .012; .00082 INJECTION, SOLUTION INTRAVENOUS at 16:11

## 2017-12-15 RX ADMIN — FENTANYL CITRATE 100 MCG: 50 INJECTION, SOLUTION INTRAMUSCULAR; INTRAVENOUS at 16:22

## 2017-12-15 RX ADMIN — Medication 0.15 MCG/KG/MIN: at 16:38

## 2017-12-15 RX ADMIN — ONDANSETRON 4 MG: 2 INJECTION INTRAMUSCULAR; INTRAVENOUS at 18:04

## 2017-12-15 RX ADMIN — GLYCOPYRROLATE 0.2 MG: 0.2 INJECTION, SOLUTION INTRAMUSCULAR; INTRAVENOUS at 16:43

## 2017-12-15 RX ADMIN — NICOTINE 1 PATCH: 14 PATCH, EXTENDED RELEASE TRANSDERMAL at 08:32

## 2017-12-15 RX ADMIN — POTASSIUM CHLORIDE: 200 INJECTION, SOLUTION INTRAVENOUS at 17:23

## 2017-12-15 RX ADMIN — CALCIUM GLUCONATE 2 G: 94 INJECTION, SOLUTION INTRAVENOUS at 10:10

## 2017-12-15 RX ADMIN — SUCCINYLCHOLINE CHLORIDE 80 MG: 20 INJECTION, SOLUTION INTRAMUSCULAR; INTRAVENOUS at 16:22

## 2017-12-15 RX ADMIN — EPHEDRINE SULFATE 10 MG: 50 INJECTION, SOLUTION INTRAMUSCULAR; INTRAVENOUS; SUBCUTANEOUS at 16:34

## 2017-12-15 NOTE — ED PROVIDER NOTES
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 Taking? Bioflavonoid Products (ASTRID C PO)   Yes Yes   Sig: Take 500 mg by mouth   Cyanocobalamin (B-12) 5000 MCG CAPS   Yes Yes   Sig: Take by mouth daily   Thyroid (LEVOTHYROXINE-LIOTHYRONINE) 30 MG TABS   Yes Yes   Sig: Take 30 mg by mouth 2 (two) times a day   calcium-vitamin D 250-100 MG-UNIT per tablet   Yes Yes   Sig: Take 1 tablet by mouth 2 (two) times a day   pseudoephedrine (SUDAFED) 30 mg tablet   Yes Yes   Sig: Take 60 mg by mouth every 4 (four) hours as needed for congestion      Facility-Administered Medications: None       Past Medical History:   Diagnosis Date    Chronic sinusitis     GERD (gastroesophageal reflux disease)     Hypotension     Seizures (HCC)        Past Surgical History:   Procedure Laterality Date    ABDOMINAL SURGERY      CLOSED REDUCTION DISTAL FEMUR FRACTURE      CRANIOTOMY      HYSTERECTOMY      HYSTERECTOMY      LUNG LOBECTOMY Right     OOPHORECTOMY      SPLENECTOMY         Family History   Problem Relation Age of Onset    Heart disease Mother     Diverticulitis Father      I have reviewed and agree with the history as documented      Social History   Substance Use Topics    Smoking status: Current Every Day Smoker     Packs/day: 1 00     Types: Cigarettes    Smokeless tobacco: Never Used    Alcohol use No        Review of Systems   Unable to perform ROS: Acuity of condition       Physical Exam  ED Triage Vitals [12/14/17 1920]   Temperature Pulse Respirations Blood Pressure SpO2   97 7 °F (36 5 °C) 73 18 121/88 96 %      Temp Source Heart Rate Source Patient Position - Orthostatic VS BP Location FiO2 (%)   Tympanic Monitor Lying Left arm --      Pain Score       No Pain           Orthostatic Vital Signs  Vitals:    12/14/17 2045 12/14/17 2115 12/14/17 2130 12/14/17 2145   BP: 150/68 (!) 171/79  154/72   Pulse: 69 71 70 83   Patient Position - Orthostatic VS:           Physical Exam   Constitutional: She is oriented to person, place, and time  She appears well-developed and well-nourished  HENT:   Mouth/Throat: Oropharynx is clear and moist    Eyes: Conjunctivae are normal  Pupils are equal, round, and reactive to light  Right eye exhibits abnormal extraocular motion  Left eye exhibits normal extraocular motion  Right eye lateral deviation   Neck: Normal range of motion  Neck supple  No spinous process tenderness present  Cardiovascular: Normal rate, regular rhythm, normal heart sounds and intact distal pulses  Pulmonary/Chest: Effort normal and breath sounds normal  No respiratory distress  She has no wheezes  Abdominal: Soft  Bowel sounds are normal  She exhibits no distension  There is no tenderness  Musculoskeletal: Normal range of motion  Lumbar back: She exhibits tenderness, bony tenderness and pain  Back:    Neurological: She is alert and oriented to person, place, and time  She has normal strength  No cranial nerve deficit or sensory deficit  Coordination abnormal  GCS eye subscore is 4  GCS verbal subscore is 4  GCS motor subscore is 6  Patient has poor control of right arm and leg    She is unable to perform cerebella exam of bilateral lower extremities    Cerebellar intact finger-to-nose bilateral    Gait not tested    Slurred speech and expressive aphasia   Skin: Skin is warm and dry  Psychiatric: Her mood appears anxious  Nursing note and vitals reviewed  ED Medications  Medications   sodium chloride 0 9 % bolus 1,000 mL (1,000 mL Intravenous New Bag 12/14/17 2119)       Diagnostic Studies  Results Reviewed     Procedure Component Value Units Date/Time    UA w Reflex to Microscopic w Reflex to Culture [[id number]]  (Abnormal) Collected:  12/14/17 2031    Lab Status:  Final result Specimen:  Urine from Urine, Straight Cath Updated:  12/14/17 2042     Color, UA Yellow     Clarity, UA Clear     Specific Gravity, UA 1 015     pH, UA 6 0     Leukocytes, UA Negative     Nitrite, UA Negative     Protein, UA Negative mg/dl      Glucose, UA Negative mg/dl      Ketones, UA 15 (1+) (A) mg/dl      Urobilinogen, UA 0 2 E U /dl      Bilirubin, UA Negative     Blood, UA Negative    Troponin I [[id number]]  (Normal) Collected:  12/14/17 1954    Lab Status:  Final result Specimen:  Blood from Arm, Right Updated:  12/14/17 2024     Troponin I <0 02 ng/mL     Narrative:         [employer] Chemistry analyzer 99% cutoff is > 0 04 ng/mL in network labs    o cTnI 99% cutoff is useful only when applied to patients in the clinical setting of myocardial ischemia  o cTnI 99% cutoff should be interpreted in the context of clinical history, ECG findings and possibly cardiac imaging to establish correct diagnosis  o cTnI 99% cutoff may be suggestive but clearly not indicative of a coronary event without the clinical setting of myocardial ischemia      Basic metabolic panel [[id number]] Collected:  12/14/17 1954    Lab Status:  Final result Specimen:  Blood from Arm, Right Updated:  12/14/17 2021     Sodium 139 mmol/L      Potassium 3 9 mmol/L      Chloride 103 mmol/L      CO2 28 mmol/L      Anion Gap 8 mmol/L      BUN 12 mg/dL      Creatinine 0 72 mg/dL      Glucose 90 mg/dL Calcium 8 9 mg/dL      eGFR 83 ml/min/1 73sq m     Narrative:         National Kidney Disease Education Program recommendations are as follows:  GFR calculation is accurate only with a steady state creatinine  Chronic Kidney disease less than 60 ml/min/1 73 sq  meters  Kidney failure less than 15 ml/min/1 73 sq  meters  APTT [[id number]]  (Normal) Collected:  12/14/17 1954    Lab Status:  Final result Specimen:  Blood from Arm, Right Updated:  12/14/17 2017     PTT 29 seconds     Narrative: Therapeutic Heparin Range = 60-90 seconds    Protime-INR [[id number]]  (Normal) Collected:  12/14/17 1954    Lab Status:  Final result Specimen:  Blood from Arm, Right Updated:  12/14/17 2017     Protime 13 1 seconds      INR 1 01    POCT Chem 8+ [[id number]]  (Abnormal) Collected:  12/14/17 2009    Lab Status:  Final result Updated:  12/14/17 2014     SODIUM, I-STAT 138 mmol/l      Potassium, i-STAT 3 9 mmol/L      Chloride, istat 101 mmol/L      CO2, i-STAT 25 mmol/L      Anion Gap, Istat 17 (H) mmol/L      Calcium, Ionized i-STAT 1 18 mmol/L      BUN, I-STAT 12 mg/dl      Creatinine, i-STAT 0 6 mg/dl      eGFR 91 ml/min/1 73sq m      Glucose, i-STAT 86 mg/dl      Hct, i-STAT 37 %      Hgb, i-STAT 12 6 g/dl      Specimen Type VENOUS    CBC [[id number]]  (Normal) Collected:  12/14/17 1954    Lab Status:  Final result Specimen:  Blood from Arm, Right Updated:  12/14/17 2006     WBC 7 63 Thousand/uL      RBC 4 24 Million/uL      Hemoglobin 12 9 g/dL      Hematocrit 38 2 %      MCV 90 fL      MCH 30 4 pg      MCHC 33 8 g/dL      RDW 13 1 %      Platelets 497 Thousands/uL      MPV 9 0 fL                  X-ray chest 1 view portable   ED Interpretation by Tam Mccarthy DO (12/14 2053)   No acute abnl, ribs missing on right      CT spine lumbar without contrast   Final Result by Jackson Fairbanks MD (12/14 2035)         1  No evidence of fracture or paraspinal mass     2  Mild, chronic disc and facet degenerative change in the lower lumbar spine            Workstation performed: [id number]         CT head without contrast   Final Result by Everardo Moore MD (12/14 2026)         1  Large left hemispheric mixed attenuation subdural hematoma with predominantly isodense elements and strandy hyperdense elements indicative of acute on subacute subdural hematoma  Local mass effect  There was a large chronic subdural hygroma in the    left hemisphere previously  Findings indicate interval hemorrhage into the previously present hygroma/chronic subdural hematoma  2   Mild, chronic microangiopathy  I personally discussed this study with Bharat Rosenbaum on 12/14/2017 8:26 PM          Workstation performed: [id number]                    Procedures  ECG 12 Lead Documentation  Date/Time: 12/14/2017 7:20 PM  Performed by: Betty Rene by: Vitaly Diaz     ECG reviewed by me, the ED Provider: yes    Patient location:  ED  Previous ECG:     Previous ECG:  Unavailable  Interpretation:     Interpretation: normal    Rate:     ECG rate:  76    ECG rate assessment: normal    Rhythm:     Rhythm: sinus rhythm    Ectopy:     Ectopy: none    QRS:     QRS axis:  Left    QRS intervals:  Normal  Conduction:     Conduction: normal    ST segments:     ST segments:  Normal  T waves:     T waves: normal               Phone Contacts  ED Phone Contact    ED Course  ED Course      After reviewing the CT scans with patient's  he proceeded to explain that patient fell around 2 pm yesterday at home, landing on her buttocks  She was able to get up on her own  She did not hit her head or pass out                            MDM  Number of Diagnoses or Management Options  Acute buttock pain: new and requires workup  Acute on chronic intracranial subdural hematoma ([address] ): new and requires workup  Fall, initial encounter: new and requires workup     Amount and/or Complexity of Data Reviewed  Clinical lab tests: ordered and reviewed  Tests in the radiology section of CPT®: ordered and reviewed  Obtain history from someone other than the patient: yes  Discuss the patient with other providers: yes    Patient Progress  Patient progress: improved    CritCare Time    Disposition  Final diagnoses:   Acute on chronic intracranial subdural hematoma ([facility] )   Fall, initial encounter   Acute buttock pain - on right     Time reflects when diagnosis was documented in both MDM as applicable and the Disposition within this note     Time User Action Codes Description Comment    12/14/2017  9:10 PM Odalys Anthonye Add [I62 01,  I62 03] Acute on chronic intracranial subdural hematoma ([facility] [address] )     12/14/2017 11:37 PM Odalys Wells Add Wesly Steiner  XXXA] Fall, initial encounter     12/14/2017 11:37 PM Odalys Wells Add [M79 1] Acute buttock pain     12/14/2017 11:37 PM Odalys Wells Modify [M79 1] Acute buttock pain on right      ED Disposition     ED Disposition Condition Comment    Transfer to Another [address] should be transferred out to *B**       MD Documentation    Flowsheet Row Most Recent Value   Patient Condition  The patient has been stabilized such that within reasonable medical probability, no material deterioration of the patient condition or the condition of the unborn child(woody) is likely to result from the transfer   Reason for Transfer  Level of Care needed not available at this facility [trauma critical care]   Benefits of Transfer  Specialized equipment and/or services available at the receiving facility (Include comment)________________________ [neurosurgery]   Risks of Transfer  Potential for delay in receiving treatment, Potential deterioration of medical condition, Loss of IV, Increased discomfort during transfer, Possible worsening of condition or death during transfer   Accepting Physician  [address], [place], [place]    (Name & Tel number)  Shellie Mccord   Sending MD Lynette Price DO     Provider Certification  General risk, such as traffic hazards, adverse weather conditions, rough terrain or turbulence, possible failure of equipment (including vehicle or aircraft), or consequences of actions of persons outside the control of the transport personnel      RN Documentation    Flowsheet Row Most [address] [facility], [place], [place]   Bed Assignment  ER    (Name & Tel number)  Ishaan Soliz [phone]   Report Given to  laureano    Medications Reviewed with Next Provider of Service  Yes   Transport Mode  Ambulance   Level of Care  Advanced life support   Copies of Medical Records Sent  History and Physical, Transfer form   Patient Belongings Disposition  Sent with family      Follow-up Information    None       Discharge Medication List as of 12/14/2017 10:02 PM      CONTINUE these medications which have NOT CHANGED    Details   Bioflavonoid Products (ASTRID C PO) Take 500 mg by mouth, Historical Med      calcium-vitamin D 250-100 MG-UNIT per tablet Take 1 tablet by mouth 2 (two) times a day, Historical Med      Cyanocobalamin (B-12) 5000 MCG CAPS Take by mouth daily, Historical Med      pseudoephedrine (SUDAFED) 30 mg tablet Take 60 mg by mouth every 4 (four) hours as needed for congestion, Historical Med      Thyroid (LEVOTHYROXINE-LIOTHYRONINE) 30 MG TABS Take 30 mg by mouth 2 (two) times a day, Historical Med           No discharge procedures on file      ED Provider  Electronically Signed by           Ravi Street DO  12/14/17 0521

## 2017-12-15 NOTE — OP NOTE
OPERATIVE REPORT  PATIENT NAME: Jessie Meza    :  1945  MRN: [id number]  Pt Location: [facility] OR ROOM 17    SURGERY DATE: 12/15/2017    Surgeon(s) and Role:     * Indira Mccord MD - Primary    Preop Diagnosis:  Subdural hematoma ([address] ) [I62 00]  Dysarthria [R47 1]  Weakness of extremity [R29 898]    Post-Op Diagnosis Codes:     * Subdural hematoma ([address] ) [I62 00]     * Dysarthria [R47 1]     * Weakness of extremity [R29 898]    Procedure(s) (LRB):  Left fronto-temporal craniotomy for evacuation of subdural hematoma and membrane stripping (Left)    Specimen(s):  ID Type Source Tests Collected by Time Destination   1 : Subdural membrane Tissue Other TISSUE EXAM Indira Mccord MD 12/15/2017 6357        Estimated Blood Loss:   Minimal    Drains:   subdural Bactoseal  10 F subgaleal drain    Anesthesia Type:   General    Operative Indications:  Subdural hematoma ([address] ) [I62 00]  Dysarthria [R47 1]  Weakness of extremity [R29 898]      Operative Findings:  Large mixed acute on subacute SDH with multiple membranes    Complications:   None    Procedure and Technique:  After adequate general endotracheal anesthesia the patient was placed supine on the operating table the head was turned to the right the hair was clipped and the scalp was prepped with Betadine soap then DuraPrep  Double layer drapes were placed in normal fashion a sticky drape was placed over these  A time-out was called and all parameters a time-out were followed    The skin was infiltrated with 1% lidocaine with 1 100,000 epinephrine  15   Blade was used to incise the skin  Bovie and bipolar were used throughout the procedure to maintain hemostasis  Bovie and a cutting setting was used to divide the tissues to through the galea  Bryanna clips were placed on the scalp edges  The temporalis fascia and muscle were incised  A cerebellar style within retractor was used to maintain operative exposure    Single bur hole was made inferiorly and superiorly and a full craniotomy flap was turned  The dura was opened in a cruciate fashion  The subdural contents were under high pressure  Part of this was gelatinous formed clot and the majority was a thick motor oil appearing fluid  This egress under high pressure  Multiple membranes were identified these were widely opened  A membrane directly adherent to the brain was easily removed and this was widely opened also  Copious quantities of irrigation were used to cleanse out the region  Bleeding points on the membrane edges were carefully coagulated  A bactoseal catheter was placed  This was tunneled inferiorly  The dura was approximated with interrupted 4 0 Nurolon suture  Piece of Gel-Foam was placed over this  The bone flap was replaced with the Blaze DFMet bone fixation system  A 10 Indonesian drain was placed in the subgaleal space  The temporalis muscle was approximated with interrupted 2 0 Vicryl suture  The galea was closed with interrupted inverted to 0 Vicryl suture  The epidermis was closed with a running 4 0 Monocryl  0 5% bupivacaine with 1 to 294151 epinephrine was infiltrated into the surrounding tissues         I was present for the entire procedure    Patient Disposition:  PACU     SIGNATURE: Irish Ramsey MD  DATE: December 15, 2017  TIME: 6:23 PM

## 2017-12-15 NOTE — PROGRESS NOTES
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  She sustained multiple falls and developed a expanding subdural hematoma  This is now quite large with brain compression and midline shift  There acute and chronic components with multiple membranes present  Plan:  Left frontotemporal craniotomy for evacuation of subdural hematoma and membrane stripping    Consent was obtained from the  because the patient at the moment is delirious  She does speak  She moves all 4s but has a earnestine-neglect  This appears to be a worsened examined comparison to her previous exam performed by Dr Lois Isbell  The risks, benefits and complications of surgery were explained in detail to her  Mr Cherelle Roblero  including hemorrhage, infection, CSF leakage, wound problems, pain, weakness, numbness, dysesthesiae, paralysis, coma, and death  Also, the possibility  of further surgery being required was emphasized  Other potential medical complications were outlined, including deep venous thrombosis, pulmonary embolism, pneumonia, urinary tract infection, myocardial infarction,  and stroke  The need for physical therapy, occupational therapy, and rehabilitation was also mentioned  The alternatives to surgery were discussed  Mr Cherelle Roblero asked relevant questions and asked that we proceed with arrangements for surgery

## 2017-12-15 NOTE — ANESTHESIA PROCEDURE NOTES
Arterial Line Insertion  Date/Time: 12/15/2017 4:50 PM  Performed by: Navjot Helms by: Renuka Paula   Consent: Verbal consent obtained  Written consent obtained  Risks and benefits: risks, benefits and alternatives were discussed  Consent given by: spouse  Preparation: Patient was prepped and draped in the usual sterile fashion  Indications: multiple ABGs and hemodynamic monitoring  Orientation:  LeftLocation: radial artery    Sedation:  Patient sedated: procedure performed with patient under GA    Bear's test normal: yes  Needle gauge: 20  Seldinger technique: Seldinger technique used  Number of attempts: 1 (x2 attempt by SRNA on right)  Post-procedure: dressing applied  Post-procedure CNS: normal  Patient tolerance: Patient tolerated the procedure well with no immediate complications

## 2017-12-15 NOTE — SOCIAL WORK
CM attempted to meet with pt  Pt is confused  CM phone pt  Michael Lucas  CM reviewed role with dcp  Pt [place]  [place]  Pt has [address] and flight of stairs to their bedroom  Pt bathroom on first floor  Pt requires some assistance for ADLs and uses a RW for safe ambulation  Pt also owns a SPC and BSC  Pt is able to do minimum cooking with the assistance from her   He reports she no longer pays the bills and he takes care of the finances  Pt  reports he is home to assist pt but she is home a lone when he needs to run errands  He reports pt has had at minimum 6 falls since returning home from [facility]  When pt d/c home from [facility] she received [facility]  He reports pt has also been at [facility] rehab back in the '70s  Pt does not drive and  provides transportation  Pt and  both retired  Pharmacy - [facility]  CM to follow  CM reviewed d/c planning process including the following: identifying help at home, patient preference for d/c planning needs, Discharge Lounge, [facility] Meds to Bed program, availability of treatment team to discuss questions or concerns patient and/or family may have regarding understanding medications and recognizing signs and symptoms once discharged  CM also encouraged patient to follow up with all recommended appointments after discharge  Patient advised of importance for patient and family to participate in managing patients medical well being

## 2017-12-15 NOTE — CASE MANAGEMENT
]  Initial Clinical Review    Admission: Date/Time/Statement: 12/15/17 @ 0010     Orders Placed This Encounter   Procedures    Inpatient Admission     Standing Status:   Standing     Number of Occurrences:   1     Order Specific Question:   Admitting Physician     Answer:   Lilly Luque     Order Specific Question:   Level of Care     Answer:   Critical Care [15]     Order Specific Question:   Bed Type     Answer:   Trauma [7]     Order Specific Question:   Estimated length of stay     Answer:   More than 2 Midnights     Order Specific Question:   Certification     Answer:   I certify that inpatient services are medically necessary for this patient for a duration of greater than two midnights  See H&P and MD Progress Notes for additional information about the patient's course of treatment  12/14/2017 (2 hours)  [facility] Emergency Department   Acute on chronic intracranial subdural hematoma ([address] )   Fall, initial encounter   Acute buttock pain   TRANSFER TO [facility] [place]  IVF:  9% NS 1L BOLUS     ED: Date/Time/Mode of Arrival:   ED Arrival Information     Expected Arrival Acuity Means of Arrival Escorted By Service Admission Type    12/14/2017 22:20 12/14/2017 22:37 [facility]gent [facility]) Trauma Emergency    Arrival Complaint    subdural, fall          Chief Complaint:   Chief Complaint   Patient presents with    Fall     pt fell in august       History of Illness:    Jessy Berry is a 67 y o  female who presents with dysarthria  Per [facility] ED, the  states that this started this morning  The patient fell yesterday onto her backside at the food store yesterday  The patient's history includes a fall on 8/1 leading to left sided weakness and dysarthria  She came in as a trauma evaluation  CT head showed small pontine lacunar infract  It was recommended that she stay for further evaluation but she left AMA   She then returned 8/4 at insistence of family  Imaging at this time showed 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 and SAH  She was hospitalized and discharged to rehab  CT done on 8/23 showed b/l stable subdural hygromas  On 9/26 she presented for trauma evaluation following recurrent falls on ASA  CT head done then showed "increased chronic left hemispheric subdural hygroma and decreased R subdural hygroma"  Once again, patient and  insisted on leaving  They were instructed to follow up outpatient with neurosurgery       CT head was performed at [facility] showing large L acute on chronic subdural hematoma with local mass effect where her left chronic hygroma was   denies use of aspirin to [facility]         ED Vital Signs:   ED Triage Vitals   Temperature Pulse Respirations Blood Pressure SpO2   12/14/17 2253 12/14/17 2253 12/14/17 2253 12/14/17 2253 12/14/17 2305   98 8 °F (37 1 °C) 76 18 132/79 98 %      Temp Source Heart Rate Source Patient Position - Orthostatic VS BP Location FiO2 (%)   12/14/17 2253 12/14/17 2253 12/14/17 2253 12/15/17 0100 --   Oral Monitor Lying Right arm       Pain Score       12/15/17 0350       No Pain        Wt Readings from Last 1 Encounters:   12/15/17 57 kg (125 lb 10 6 oz)     Date and Time Trauma Responsiveness Trauma Length of LOC (Seconds) R Pupil Size (mm) L Pupil Size (mm) R Pupil Reaction L Pupil Reaction   12/15/17 1200 -- -- 5 5 Nonreactive Nonreactive   12/15/17 1100 -- -- 5 5 Nonreactive Nonreactive   12/15/17 1000 -- -- 5 5 Nonreactive Nonreactive   12/15/17 0900 -- -- 5 5 Nonreactive Nonreactive   12/15/17 0800 -- -- 5 5 Nonreactive Nonreactive   12/15/17 0700 -- -- 5 5 Nonreactive Nonreactive   12/15/17 0600 -- -- 5 5 Nonreactive Nonreactive   12/15/17 0500 -- -- 5 5 Nonreactive Nonreactive   12/15/17 0428 -- -- 5 5 Nonreactive Nonreactive   12/15/17 0350 -- -- 5 5 Nonreactive Nonreactive   12/15/17 0300 -- -- 2 2 Sluggish Brisk   12/15/17 0200 -- -- 2 2 Sluggish Brisk   12/15/17 0015 -- -- 2 2 Brisk Brisk   12/14/17 2345 -- -- 2 2 Brisk Brisk   12/14/17 2330 -- -- 2 2 Brisk Brisk   12/14/17 2317 Alert -- -- -- -- --   12/14/17 2315 -- -- 2 2 Brisk Brisk   12/14/17 2305 -- -- 2 2 Brisk Brisk   12/14/17 2253 -- -- 2 2 Brisk Brisk   12/14/17 1927 -- -- 2 2 Brisk Brisk     R eye deviated laterally, pupils equal but not responsive, this is apparently her baseline    Abnormal Labs/Diagnostic Test Results:     CT HEAD   IMPRESSION:   1   Large left hemispheric mixed attenuation subdural hematoma with predominantly isodense elements and strandy hyperdense elements indicative of acute on subacute subdural hematoma  Local mass effect  There was a large chronic subdural hygroma in the   left hemisphere previously  Findings indicate interval hemorrhage into the previously present hygroma/chronic subdural hematoma  2   Mild, chronic microangiopathy  Past Medical/Surgical History: Active Ambulatory Problems     Diagnosis Date Noted    CVA (cerebral vascular accident) ([facility] ) 08/04/2017    Weakness of extremity 08/04/2017    Hyponatremia 08/04/2017    SAH (subarachnoid hemorrhage) ([facility] ) 08/04/2017    SAH (subarachnoid hemorrhage) ([facility] ) 08/05/2017    Lacunar infarct, acute ([facility] ) 08/05/2017    Left-sided weakness 08/05/2017    Dysarthria 08/05/2017    Debility 08/09/2017       Past Medical History:    Chronic sinusitis     GERD (gastroesophageal reflux disease)     Hypotension     Seizures (HCC)        Admitting Diagnosis: Unspecified multiple injuries, initial encounter [T07  XXXA]    Age/Sex: 67 y o  female    Assessment/Plan:     Trauma Active Problems:      Acute on chronic SDH w/ worsening dysarthria     Trauma Plan:      Repeat CT head 7AM or earlier if GCS changes  Neurosurgery consult in AM       Admission Orders:    REASON FOR NOT INITIATING IV THROMBOLYTIC Last known well >4 5 hr  or  unable to determine  DYSPHAGIA ASSESSMENT PRIOR TO PO  NPO  PT AND OT EVAL AND TREAT  SPEECH EVAL AND TREAT   NEURO CHECKS Q1 HR  NEURO SURGERY CONSULT  SEQUENTIAL COMPRESSION DEVICE  1 UNIT OF FFP  TRANSFUSING   1 UNIT OF FFP   WAITING TO TRANSFUSE       Scheduled Meds:   nicotine 1 patch Transdermal Daily   phytonadione 10 mg Intravenous Once     Continuous Infusions:   multi-electrolyte 75 mL/hr Last Rate: 75 mL/hr (12/15/17 2312)     PRN Med

## 2017-12-15 NOTE — EMTALA/ACUTE CARE TRANSFER
[id number]  Dept: [address] [place] [address] CONSENT    NAME Keren Goyal                                         1945                              MRN [id number]    I have been informed of my rights regarding examination, treatment, and transfer   by Dr Matias Samaniego DO    Benefits: Specialized equipment and/or services available at the receiving facility (Include comment)________________________ (neurosurgery)    Risks: Potential for delay in receiving treatment, Potential deterioration of medical condition, Loss of IV, Increased discomfort during transfer, Possible worsening of condition or death during transfer      Consent for Transfer:  I acknowledge that my medical condition has been evaluated and explained to me by the emergency department physician or other qualified medical person and/or my attending physician, who has recommended that I be transferred to the service of  Accepting Physician: Tali Goodrich at [address] [place] [address] Name, [address] : [address] [place], [place]  The above potential benefits of such transfer, the potential risks associated with such transfer, and the probable risks of not being transferred have been explained to me, and I fully understand them  The doctor has explained that, in my case, the benefits of transfer outweigh the risks  I agree to be transferred  I authorize the performance of emergency medical procedures and treatments upon me in both transit and upon arrival at the receiving facility  Additionally, I authorize the release of any and all medical records to the receiving facility and request they be transported with me, if possible  I understand that the safest mode of transportation during a medical emergency is an ambulance and that the Hospital advocates the use of this mode of transport   Risks of traveling to the receiving facility by car, including absence of medical control, life sustaining equipment, such as oxygen, and medical personnel has been explained to me and I fully understand them  (JASMIN CORRECT BOX BELOW)  [  ]  I consent to the stated transfer and to be transported by ambulance/helicopter  [  ]  I consent to the stated transfer, but refuse transportation by ambulance and accept full responsibility for my transportation by car  I understand the risks of non-ambulance transfers and I exonerate the Hospital and its staff from any deterioration in my condition that results from this refusal     X___________________________________________    DATE  17  TIME________  Signature of patient or legally responsible individual signing on patient behalf           RELATIONSHIP TO PATIENT_________________________          Provider Certification    NAME Manohar Gutierrez                                         1945                              MRN [id number]    A medical screening exam was performed on the above named patient  Based on the examination:    Condition Necessitating Transfer The encounter diagnosis was Acute on chronic intracranial subdural hematoma ([facility] [address] )      Patient Condition: The patient has been stabilized such that within reasonable medical probability, no material deterioration of the patient condition or the condition of the unborn child(woody) is likely to result from the transfer    Reason for Transfer: Level of Care needed not available at this facility (trauma critical care)    Transfer Requirements: [address] [place] [address]   · Space available and qualified personnel available for treatment as acknowledged by Zohreh Benavides [id number]  · Agreed to accept transfer and to provide appropriate medical treatment as acknowledged by       Brittany Mccord  · Appropriate medical records of the examination and treatment of the patient are provided at the time of transfer   [address] _______  · Transfer will be performed by qualified personnel from and appropriate transfer equipment as required, including the use of necessary and appropriate life support measures  Provider Certification: I have examined the patient and explained the following risks and benefits of being transferred/refusing transfer to the patient/family:  General risk, such as traffic hazards, adverse weather conditions, rough terrain or turbulence, possible failure of equipment (including vehicle or aircraft), or consequences of actions of persons outside the control of the transport personnel      Based on these reasonable risks and benefits to the patient and/or the unborn child(woody), and based upon the information available at the time of the patients examination, I certify that the medical benefits reasonably to be expected from the provision of appropriate medical treatments at another medical facility outweigh the increasing risks, if any, to the individuals medical condition, and in the case of labor to the unborn child, from effecting the transfer      X____________________________________________ DATE 12/14/17        TIME_______      ORIGINAL - SEND TO MEDICAL RECORDS   COPY - SEND WITH PATIENT DURING TRANSFER

## 2017-12-15 NOTE — H&P
H&P Exam - Trauma   Mable Jimenez 67 y o  female MRN: [id number]  Unit/Bed#: ED 06 Encounter: [id number]    Assessment/Plan   Trauma Alert: Evaluation  Model of Arrival: Transfer [facility]  Trauma Team: Attending Dr David Miranda, Residents Dr Omer Echols and Fellow Dr Lucas Simmons  Consultants: None    Trauma Active Problems:     Acute on chronic SDH w/ worsening dysarthria    Trauma Plan:     Repeat CT head 7AM or earlier if GCS changes  Neurosurgery consult in AM    Chief Complaint: Denies any complaints    History of Present Illness   HPI:  Mable Jimenez is a 67 y o  female who presents with dysarthria  Per [facility] ED, the  states that this started this morning  The patient fell yesterday onto her backside at the food store yesterday  The patient's history includes a fall on 8/1 leading to left sided weakness and dysarthria  She came in as a trauma evaluation  CT head showed small pontine lacunar infract  It was recommended that she stay for further evaluation but she left AMA  She then returned 8/4 at insistence of family  Imaging at this time showed 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 and SAH  She was hospitalized and discharged to rehab  CT done on 8/23 showed b/l stable subdural hygromas  On 9/26 she presented for trauma evaluation following recurrent falls on ASA  CT head done then showed "increased chronic left hemispheric subdural hygroma and decreased R subdural hygroma"  Once again, patient and  insisted on leaving  They were instructed to follow up outpatient with neurosurgery  CT head was performed at [facility] showing large L acute on chronic subdural hematoma with local mass effect where her left chronic hygroma was   denies use of aspirin to [facility]  Mechanism:Fall    Review of Systems   Constitutional: Negative for chills and fever  Eyes: Negative for pain and visual disturbance  Respiratory: Negative for chest tightness and shortness of breath      Cardiovascular: Negative for chest pain and palpitations  Gastrointestinal: Negative for nausea and vomiting  Neurological: Negative for dizziness, weakness, light-headedness and headaches  Historical Information     Past Medical History:   Diagnosis Date    Chronic sinusitis     GERD (gastroesophageal reflux disease)     Hypotension     Seizures (HCC)      Past Surgical History:   Procedure Laterality Date    ABDOMINAL SURGERY      CLOSED REDUCTION DISTAL FEMUR FRACTURE      CRANIOTOMY      HYSTERECTOMY      HYSTERECTOMY      LUNG LOBECTOMY Right     OOPHORECTOMY      SPLENECTOMY       Social History   History   Alcohol Use No     History   Drug Use No     History   Smoking Status    Current Every Day Smoker    Packs/day: 1 00    Types: Cigarettes   Smokeless Tobacco    Never Used     Immunization History   Administered Date(s) Administered    Influenza TIV (IM) 10/18/2015       Meds/Allergies   all current active meds have been reviewed, current meds:   No current facility-administered medications for this encounter  and PTA meds:   Prior to Admission Medications   Prescriptions Last Dose Informant Patient Reported? Taking?    Bioflavonoid Products (ASTRID C PO)   Yes No   Sig: Take 500 mg by mouth   Cyanocobalamin (B-12) 5000 MCG CAPS   Yes No   Sig: Take by mouth daily   Thyroid (LEVOTHYROXINE-LIOTHYRONINE) 30 MG TABS   Yes No   Sig: Take 30 mg by mouth 2 (two) times a day   calcium-vitamin D 250-100 MG-UNIT per tablet   Yes No   Sig: Take 1 tablet by mouth 2 (two) times a day   pseudoephedrine (SUDAFED) 30 mg tablet   Yes No   Sig: Take 60 mg by mouth every 4 (four) hours as needed for congestion      Facility-Administered Medications: None       Allergies   Allergen Reactions    Acetazolamide      Other reaction(s): Unknown Allergic Reaction    Azithromycin      Other reaction(s): GI Upset    Barium Sulfate      Other reaction(s): GI Upset    Cefazolin     Loperamide      Other reaction(s): Unknown Allergic Reaction    Penicillins      Other reaction(s): Fever    Procaine Other (See Comments)     fever    Sulfa Antibiotics      fever         PHYSICAL EXAM    Objective   Vitals:   First set: Temperature: 98 8 °F (37 1 °C) (12/14/17 2253)  Pulse: 76 (12/14/17 2253)  Respirations: 18 (12/14/17 2253)  Blood Pressure: 132/79 (12/14/17 2253)    Primary Survey:   (A) Airway: Airway   (B) Breathing: Intact  (C) Circulation: Pulses:   normal  (D) Disabliity:  GCS Total:  14  (E) Expose:  Completed    Secondary Survey: (Click on Physical Exam tab above)  Physical Exam   Constitutional: She is oriented to person, place, and time  No distress  HENT:   Head: Normocephalic and atraumatic  Eyes:   Pupils equal and reactive  Right eye deviates laterally (chronic)   Neck: Normal range of motion  Cardiovascular: Normal rate and intact distal pulses  Pulmonary/Chest: Effort normal  No respiratory distress  Abdominal: Soft  There is no tenderness  Musculoskeletal: Normal range of motion  Neurological: She is alert and oriented to person, place, and time  5/5 Upper and lower strength   Skin: She is not diaphoretic  Psychiatric:   Annoyed at being in hospital  Dysarthria - worsened from baseline       Invasive Devices     Peripheral Intravenous Line            Peripheral IV 12/14/17 Right Antecubital less than 1 day                Lab Results:   Results: I have personally reviewed pertinent reports     and BMP/CMP:   Lab Results   Component Value Date     12/14/2017    K 3 9 12/14/2017     12/14/2017    CO2 28 12/14/2017    ANIONGAP 8 12/14/2017    BUN 12 12/14/2017    CREATININE 0 72 12/14/2017    GLUCOSE 86 12/14/2017    CALCIUM 8 9 12/14/2017    EGFR 91 12/14/2017     Imaging/EKG Studies: CT Scan Head: As below  1   Large left hemispheric mixed attenuation subdural hematoma with predominantly isodense elements and strandy hyperdense elements indicative of acute on subacute subdural hematoma   Local mass effect  Mando Sheridan was a large chronic subdural hygroma in the   left hemisphere previously   Findings indicate interval hemorrhage into the previously present hygroma/chronic subdural hematoma  2   Mild, chronic microangiopathy      Code Status: Prior  Advance Directive and Living Will:      Power of :    POLST:

## 2017-12-15 NOTE — PROGRESS NOTES
Accept Note - Critical Care   Ava Gutierrez 67 y o  female MRN: [id number]  Unit/Bed#: ICU 07 Encounter: [id number]    Assessment: 76yo F presenting to the [facility] ED with dysarthria which started this morning  Patient had a fall yesterday at the grocery store, was found to have an acute on chronic L subdural hematoma with significant midline shift  Plan:          Neuro:    -Pain    -patient has no complaints at this time   - neurosurg consult in the morning  F/u their recs   - q1hr neuro checks   - Repeat CT read as evolving from previous by rads, patient has no focal deficits at this time, on                  CV:    - patient's BP maintained >120, will attempt to keep it <160      -will follow neurosurgs recs   - no cardiac hx, will continue to monitor                 Lung:    - patient's lungs are clear, no complaints at this time                 GI:    -not complaining of any nausea or pain, will continue to evaluate   - Pepcid 20mg BID                  FEN:    - Started isolyte at 75mL/hr as patient is currently NPO   - I-STAT and BMP unremarkable    - morning labs showed hypocalcemia, will replete   - patient failed swallow eval for dysarthria, will have speech evaluate the patient then either start diet per their recs or TF  :    - no complaints at this time, will continue to evaluate, no alexis at this time  - UA negative for infection in the ED                 ID:    - not on any abx, no suspicion for infection, will monitor                 Heme:    - Patient's initial CBC was unremarkable    - Morning lab showed Hb of 10 6 and WBC of 6 83   - patient not on any dvt ppx due to [address]                    Endo: will continue to monitor patient's blood sugars                            Msk/Skin: turning in bed with PT/OT evaluation                 Disposition: Continued ICU stay, follow neurosurg recs    Chief Complaint: "I'm cold!"    HPI/24hr events: None, patient admitted    Physical Exam: Physical Exam   Constitutional: She is oriented to person, place, and time  She appears well-developed and well-nourished  No distress  HENT:   Head: Normocephalic and atraumatic  Eyes: Conjunctivae and EOM are normal  No scleral icterus  R eye deviated laterally, pupils equal but not responsive, this is apparently her baseline  Neck: Normal range of motion  Neck supple  Cardiovascular: Normal rate, regular rhythm and normal heart sounds  No murmur heard  Pulmonary/Chest: Effort normal and breath sounds normal  No respiratory distress  Abdominal: Soft  Bowel sounds are normal  There is no tenderness  Musculoskeletal: Normal range of motion  Neurological: She is alert and oriented to person, place, and time  Skin: Skin is warm and dry  Psychiatric: She has a normal mood and affect  Her behavior is normal    Nursing note and vitals reviewed  Vitals:    12/15/17 0400 12/15/17 0528 12/15/17 0603 12/15/17 0700   BP: 103/58 122/94 130/61    Pulse: 72 74 76 74   Resp: 22  21 18   Temp: 98 8 °F (37 1 °C)      TempSrc: Oral      SpO2: 93% 96% 97% 97%   Weight:       Height:                 Temperature:   Temp (24hrs), Av 4 °F (36 9 °C), Min:97 7 °F (36 5 °C), Max:98 8 °F (37 1 °C)    Current: Temperature: 98 8 °F (37 1 °C)    Weights:   IBW: 61 6 kg    Body mass index is 19 68 kg/m²    Weight (last 2 days)     Date/Time   Weight    12/15/17 0350  57 (125 66)    17 22:53:25  59 (130)              Hemodynamic Monitoring:  PAP:       Non-Invasive/Invasive Ventilation Settings:  Respiratory    Lab Data (Last 4 hours)    None         O2/Vent Data (Last 4 hours)    None              No results found for: PHART, MVA7WWJ, PO2ART, IRP2HMY, I3MVZDUF, BEART, SOURCE  SpO2: SpO2: 97 %    Intake and Outputs:  I/O     None        Nutrition:        Diet Orders            Start     Ordered    12/15/17 0719  Diet NPO  Diet effective now     Comments:  Pending speech eval and clearance   Question Answer Comment   Diet Type NPO    RD to adjust diet per protocol? No        12/15/17 0718          Labs:     Results from last 7 days  Lab Units 12/15/17  0601 12/14/17 2009 12/14/17 1954   WBC Thousand/uL 6 83  --  7 63   HEMOGLOBIN g/dL 10 6*  --  12 9   I STAT HEMOGLOBIN g/dl  --  12 6  --    HEMATOCRIT % 30 8*  --  38 2   PLATELETS Thousands/uL 284  --  383   NEUTROS PCT % 61  --   --    MONOS PCT % 9  --   --         Results from last 7 days  Lab Units 12/15/17  0601 12/14/17 2009 12/14/17 1954   SODIUM mmol/L 144  --  139   POTASSIUM mmol/L 3 5  --  3 9   CHLORIDE mmol/L 116*  --  103   CO2 mmol/L 21  --  28   BUN mg/dL 9  --  12   CREATININE mg/dL 0 31*  --  0 72   CALCIUM mg/dL 6 7*  --  8 9   GLUCOSE RANDOM mg/dL 68  --  90   GLUCOSE, ISTAT mg/dl  --  86  --                 Results from last 7 days  Lab Units 12/15/17  0601 12/14/17 1954   INR  1 20* 1 01   PTT seconds  --  29           0  Lab Value Date/Time   TROPONINI <0 02 12/14/2017 1954   TROPONINI 0 02 08/04/2017 1225   TROPONINI 0 16 (H) 08/02/2017 1456       Imaging:  I have personally reviewed pertinent reports  Micro:  No results found for: Denise Donis, SPUTUMCULTUR    Allergies:    Allergies   Allergen Reactions    Acetazolamide      Other reaction(s): Unknown Allergic Reaction    Azithromycin      Other reaction(s): GI Upset    Barium Sulfate      Other reaction(s): GI Upset    Cefazolin     Loperamide      Other reaction(s): Unknown Allergic Reaction    Penicillins      Other reaction(s): Fever    Procaine Other (See Comments)     fever    Sulfa Antibiotics      fever       Medications:   Scheduled Meds:    calcium gluconate 2 g Intravenous Once   nicotine 1 patch Transdermal Daily     Continuous Infusions:    multi-electrolyte 75 mL/hr Last Rate: 75 mL/hr (12/15/17 0736)     PRN Meds:       VTE Pharmacologic Prophylaxis: Reason for no pharmacologic prophylaxis ICH  VTE Mechanical Prophylaxis: sequential compression device    Invasive lines and devices: Invasive Devices     Peripheral Intravenous Line            Peripheral IV 12/14/17 Right Antecubital less than 1 day    Peripheral IV 12/15/17 Left Forearm less than 1 day                   Counseling / Coordination of Care  Total Critical Care time spent 30 minutes excluding procedures, teaching and family updates  Code Status: Level 1 - Full Code     Portions of the record may have been created with voice recognition software  Occasional wrong word or "sound a like" substitutions may have occurred due to the inherent limitations of voice recognition software  Read the chart carefully and recognize, using context, where substitutions have occurred       Evelia Bobo MD

## 2017-12-15 NOTE — ANESTHESIA PREPROCEDURE EVALUATION
Review of Systems/Medical History  Patient summary reviewed  Chart reviewed  No history of anesthetic complications     Cardiovascular  EKG reviewed, Exercise tolerance: good,    Comment: TTE 8/7/17:  SUMMARY  LEFT VENTRICLE:  Systolic function was normal  Ejection fraction was estimated to be 65 %  There were no regional wall motion abnormalities  There was no evidence of concentric hypertrophy  Doppler parameters were consistent with abnormal left ventricular relaxation (grade 1 diastolic dysfunction)  MITRAL VALVE:  There was trace regurgitation  TRICUSPID VALVE:  There was trace regurgitation  ,  Pulmonary  Smoker (1 5 ppd) , No recent URI , ,        GI/Hepatic    GERD poorly controlled,        Kidney stones,        Endo/Other  Negative endo/other ROS      GYN    Hysterectomy,        Hematology  Negative hematology ROS      Musculoskeletal  Negative musculoskeletal ROS        Neurology  Seizures ,  CVA ,   Comment: Hx of left sided brain tumor (non-malignant) s/p crani for resection in distant past  Left sided weakness Psychology   Negative psychology ROS            Physical Exam    Airway    Mallampati score: III  TM Distance: >3 FB  Neck ROM: full     Dental   No notable dental hx     Cardiovascular  Rhythm: regular, Rate: normal,     Pulmonary  No wheezes,     Other Findings  Confused, disconjugate gaze      Anesthesia Plan  ASA Score- 3       Anesthesia Type- general with ASA Monitors  Additional Monitors: arterial line  Airway Plan: ETT  Induction- intravenous  Informed Consent- Anesthetic plan and risks discussed with patient and spouse  12/15/17 0331 CT head wo contrast    Impression:     Large mixed density acute on chronic evolving left subdural hematoma, minimally increased in size with minimally increased left to right midline shift when compared to previous examination                    12/14/17 2025 X-ray chest 1 view portable    Impression:     No active pulmonary disease          Lab Results   Component Value Date    WBC 6 83 12/15/2017    HGB 12 4 12/15/2017    HCT 35 8 12/15/2017    MCV 90 12/15/2017     12/15/2017     Lab Results   Component Value Date    GLUCOSE 68 12/15/2017    CALCIUM 6 7 (L) 12/15/2017     12/15/2017    K 3 5 12/15/2017    CO2 21 12/15/2017     (H) 12/15/2017    BUN 9 12/15/2017    CREATININE 0 31 (L) 12/15/2017     Lab Results   Component Value Date    ALT 25 08/05/2017    AST 24 08/05/2017    ALKPHOS 75 08/05/2017    BILITOT 0 64 08/05/2017     Lab Results   Component Value Date    INR 1 05 12/15/2017    INR 1 20 (H) 12/15/2017    INR 1 01 12/14/2017    PROTIME 13 7 12/15/2017    PROTIME 15 3 (H) 12/15/2017    PROTIME 13 1 12/14/2017     Lab Results   Component Value Date    HGBA1C 5 6 08/11/2017

## 2017-12-15 NOTE — NUTRITION
If unable to safely advance PO diet in next 24 hrs then consider an alternate means of nutrition support and reconsult RD for recs

## 2017-12-15 NOTE — ED NOTES
Pt transported to [address] [place] [address], [address] [place] [address]  12/14/17 2014

## 2017-12-15 NOTE — SPEECH THERAPY NOTE
New consult received for speech evaluation for 68 y/o female with acute on chronic L subdural hematoma with significant midline shift  SLP arrived for evaluation  Neurosurgery team arrived in room and spoke with pt  They asked that we hold swallowing evaluation for now due to plan for OR later today  Speech will f/u with evaluation as able, as appropriate
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
## 2017-12-15 NOTE — ANESTHESIA POSTPROCEDURE EVALUATION
Post-Op Assessment Note      CV Status:  Stable    Post-procedure mental status: drowsy, airway patent      Hydration Status:  Euvolemic    PONV Controlled:  Controlled    Airway Patency:  Patent    Post Op Vitals Reviewed: Yes          Staff: Anesthesiologist           /69 (12/15/17 1827)    Temp (!) 97 3 °F (36 3 °C) (12/15/17 1827)    Pulse 96 (12/15/17 1827)   Resp 20 (12/15/17 1827)    SpO2 100 % (12/15/17 1827)

## 2017-12-15 NOTE — PROGRESS NOTES
12/15/17 [id number] [address]   Spiritual Beliefs/Perceptions   Concept of God Accepting   God's Role in Disease Natural   Relationship with God Close   Psychosocial   Psychosocial (WDL) WDL   Patient Behaviors/Mood Appropriate for situation;Brightens with approach   Length of Time/Family Visitation 16-30 min   Stress Factors   Patient Stress Factors Health changes   Plan of Care   Comments PT asked for prayers   Provided prayers and hospitaliity by arranging covers and calling nurse for her    Assessment Completed by: Unit visit

## 2017-12-16 ENCOUNTER — APPOINTMENT (INPATIENT)
Dept: RADIOLOGY | Facility: HOSPITAL | Age: 72
DRG: 025 | End: 2017-12-16
Payer: COMMERCIAL

## 2017-12-16 LAB
ABO GROUP BLD BPU: NORMAL
ABO GROUP BLD BPU: NORMAL
ANION GAP SERPL CALCULATED.3IONS-SCNC: 8 MMOL/L (ref 4–13)
ANION GAP SERPL CALCULATED.3IONS-SCNC: 8 MMOL/L (ref 4–13)
BPU ID: NORMAL
BPU ID: NORMAL
BUN SERPL-MCNC: 6 MG/DL (ref 5–25)
BUN SERPL-MCNC: 6 MG/DL (ref 5–25)
CA-I BLD-SCNC: 1.13 MMOL/L (ref 1.12–1.32)
CALCIUM SERPL-MCNC: 8.3 MG/DL (ref 8.3–10.1)
CALCIUM SERPL-MCNC: 8.6 MG/DL (ref 8.3–10.1)
CHLORIDE SERPL-SCNC: 104 MMOL/L (ref 100–108)
CHLORIDE SERPL-SCNC: 106 MMOL/L (ref 100–108)
CO2 SERPL-SCNC: 23 MMOL/L (ref 21–32)
CO2 SERPL-SCNC: 23 MMOL/L (ref 21–32)
CREAT SERPL-MCNC: 0.33 MG/DL (ref 0.6–1.3)
CREAT SERPL-MCNC: 0.4 MG/DL (ref 0.6–1.3)
ERYTHROCYTE [DISTWIDTH] IN BLOOD BY AUTOMATED COUNT: 12.9 % (ref 11.6–15.1)
GFR SERPL CREATININE-BSD FRML MDRD: 104 ML/MIN/1.73SQ M
GFR SERPL CREATININE-BSD FRML MDRD: 110 ML/MIN/1.73SQ M
GLUCOSE SERPL-MCNC: 106 MG/DL (ref 65–140)
GLUCOSE SERPL-MCNC: 122 MG/DL (ref 65–140)
GLUCOSE SERPL-MCNC: 141 MG/DL (ref 65–140)
GLUCOSE SERPL-MCNC: 91 MG/DL (ref 65–140)
GLUCOSE SERPL-MCNC: 97 MG/DL (ref 65–140)
HCT VFR BLD AUTO: 32.4 % (ref 34.8–46.1)
HGB BLD-MCNC: 11.4 G/DL (ref 11.5–15.4)
MAGNESIUM SERPL-MCNC: 2.1 MG/DL (ref 1.6–2.6)
MCH RBC QN AUTO: 31.1 PG (ref 26.8–34.3)
MCHC RBC AUTO-ENTMCNC: 35.2 G/DL (ref 31.4–37.4)
MCV RBC AUTO: 89 FL (ref 82–98)
MRSA NOSE QL CULT: NORMAL
PHOSPHATE SERPL-MCNC: 2.8 MG/DL (ref 2.3–4.1)
PLATELET # BLD AUTO: 336 THOUSANDS/UL (ref 149–390)
PMV BLD AUTO: 8.8 FL (ref 8.9–12.7)
POTASSIUM SERPL-SCNC: 3.8 MMOL/L (ref 3.5–5.3)
POTASSIUM SERPL-SCNC: 4 MMOL/L (ref 3.5–5.3)
RBC # BLD AUTO: 3.66 MILLION/UL (ref 3.81–5.12)
SODIUM SERPL-SCNC: 135 MMOL/L (ref 136–145)
SODIUM SERPL-SCNC: 137 MMOL/L (ref 136–145)
UNIT DISPENSE STATUS: NORMAL
UNIT DISPENSE STATUS: NORMAL
UNIT PRODUCT CODE: NORMAL
UNIT PRODUCT CODE: NORMAL
UNIT RH: NORMAL
UNIT RH: NORMAL
WBC # BLD AUTO: 11.13 THOUSAND/UL (ref 4.31–10.16)

## 2017-12-16 PROCEDURE — 84100 ASSAY OF PHOSPHORUS: CPT | Performed by: PHYSICIAN ASSISTANT

## 2017-12-16 PROCEDURE — 85027 COMPLETE CBC AUTOMATED: CPT | Performed by: PHYSICIAN ASSISTANT

## 2017-12-16 PROCEDURE — 82948 REAGENT STRIP/BLOOD GLUCOSE: CPT

## 2017-12-16 PROCEDURE — 80048 BASIC METABOLIC PNL TOTAL CA: CPT | Performed by: PHYSICIAN ASSISTANT

## 2017-12-16 PROCEDURE — 92610 EVALUATE SWALLOWING FUNCTION: CPT

## 2017-12-16 PROCEDURE — 70450 CT HEAD/BRAIN W/O DYE: CPT

## 2017-12-16 PROCEDURE — 83735 ASSAY OF MAGNESIUM: CPT | Performed by: PHYSICIAN ASSISTANT

## 2017-12-16 PROCEDURE — 82330 ASSAY OF CALCIUM: CPT | Performed by: PHYSICIAN ASSISTANT

## 2017-12-16 RX ORDER — ACETAMINOPHEN 325 MG/1
650 TABLET ORAL EVERY 4 HOURS PRN
Status: DISCONTINUED | OUTPATIENT
Start: 2017-12-16 | End: 2017-12-18

## 2017-12-16 RX ORDER — OXYCODONE HYDROCHLORIDE 10 MG/1
10 TABLET ORAL EVERY 4 HOURS PRN
Status: DISCONTINUED | OUTPATIENT
Start: 2017-12-16 | End: 2017-12-18

## 2017-12-16 RX ORDER — FAMOTIDINE 20 MG/1
20 TABLET, FILM COATED ORAL 2 TIMES DAILY
Status: DISCONTINUED | OUTPATIENT
Start: 2017-12-16 | End: 2017-12-18

## 2017-12-16 RX ORDER — OXYCODONE HYDROCHLORIDE 5 MG/1
2.5 TABLET ORAL EVERY 4 HOURS PRN
Status: DISCONTINUED | OUTPATIENT
Start: 2017-12-16 | End: 2017-12-18

## 2017-12-16 RX ADMIN — LEVETIRACETAM 500 MG: 100 INJECTION, SOLUTION INTRAVENOUS at 05:03

## 2017-12-16 RX ADMIN — DOCUSATE SODIUM 100 MG: 100 CAPSULE, LIQUID FILLED ORAL at 08:50

## 2017-12-16 RX ADMIN — DOCUSATE SODIUM 100 MG: 100 CAPSULE, LIQUID FILLED ORAL at 18:11

## 2017-12-16 RX ADMIN — LEVOTHYROXINE SODIUM 25 MCG: 25 TABLET ORAL at 06:13

## 2017-12-16 RX ADMIN — VANCOMYCIN HYDROCHLORIDE 1000 MG: 1 INJECTION, SOLUTION INTRAVENOUS at 06:01

## 2017-12-16 RX ADMIN — LEVETIRACETAM 500 MG: 100 INJECTION, SOLUTION INTRAVENOUS at 18:13

## 2017-12-16 RX ADMIN — FAMOTIDINE 20 MG: 20 TABLET ORAL at 13:10

## 2017-12-16 RX ADMIN — SODIUM CHLORIDE 75 ML/HR: 0.9 INJECTION, SOLUTION INTRAVENOUS at 06:03

## 2017-12-16 RX ADMIN — FAMOTIDINE 20 MG: 20 TABLET ORAL at 18:13

## 2017-12-16 RX ADMIN — NICOTINE 1 PATCH: 14 PATCH, EXTENDED RELEASE TRANSDERMAL at 08:52

## 2017-12-16 NOTE — PROGRESS NOTES
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    Plan:  Neuro exam is stable- FREEMAN, Dysarthria, Confused  Continue drains  Follow exam  SCD's, Hold SQH  Keppra    Subjective/Objective       Subjective: No complaints  Objective: Lying comfortable in bed        Invasive Devices     Peripheral Intravenous Line            Peripheral IV 12/14/17 Right Antecubital 1 day    Peripheral IV 12/15/17 Left Forearm 1 day          Arterial Line            Arterial Line 12/15/17 Left Radial less than 1 day          Drain            Closed/Suction Drain Left Head Bulb 10 Fr  less than 1 day    Urethral Catheter Double-lumen 16 Fr  less than 1 day    Ventriculostomy/Subdural Subdural drainage catheter Left Frontal region less than 1 day                Physical Exam:  General Appearance:   Alert, cooperative, no distress, appears stated age  Head:    Normocephalic  Eyes:    PERRL, EOM's intact       Lungs:  Clear to auscultation bilaterally, respirations unlabored  Heart:    Regular rate and rhythm  Neurologic: CNII-XII intact  AAOx2-Confused to time, Dysarthric,PERRL, EOMI, FS, TML,   FREEMAN 4/5, No drift, No focal weakness  Inc C/D/I  Drains in place    Vitals: Blood pressure 120/62, pulse 66, temperature 97 5 °F (36 4 °C), temperature source Oral, resp  rate 18, height 5' 7" (1 702 m), weight 57 kg (125 lb 10 6 oz), SpO2 98 %, not currently breastfeeding  ,Body mass index is 19 68 kg/m²      Hemodynamic Monitoring:  MAP: Arterial Line MAP (mmHg): 272 mmHg  CPP:    CVP:     ICP Mean:                Lab Results:  Admission on 12/14/2017   Component Date Value    WBC 12/15/2017 6 83     RBC 12/15/2017 3 42*    Hemoglobin 12/15/2017 10 6*    Hematocrit 12/15/2017 30 8*    MCV 12/15/2017 90     MCH 12/15/2017 31 0     MCHC 12/15/2017 34 4     RDW 12/15/2017 13 1     MPV 12/15/2017 8 7*    Platelets 08/41/7046 284     nRBC 12/15/2017 0     Neutrophils Relative 12/15/2017 61     Lymphocytes Relative 12/15/2017 27     Monocytes Relative 12/15/2017 9     Eosinophils Relative 12/15/2017 2     Basophils Relative 12/15/2017 1     Neutrophils Absolute 12/15/2017 4 18     Lymphocytes Absolute 12/15/2017 1 83     Monocytes Absolute 12/15/2017 0 60     Eosinophils Absolute 12/15/2017 0 16     Basophils Absolute 12/15/2017 0 04     Sodium 12/15/2017 144     Potassium 12/15/2017 3 5     Chloride 12/15/2017 116*    CO2 12/15/2017 21     Anion Gap 12/15/2017 7     BUN 12/15/2017 9     Creatinine 12/15/2017 0 31*    Glucose 12/15/2017 68     Calcium 12/15/2017 6 7*    eGFR 12/15/2017 113     Protime 12/15/2017 15 3*    INR 12/15/2017 1 20*    POC Glucose 12/15/2017 75     Unit Product Code 12/16/2017 T8891E85     Unit Number 12/16/2017 [id number]     Unit ABO 12/16/2017 A     Unit RH 12/16/2017 POS     Unit Dispense Status 12/16/2017 Presumed Trans     Unit Product Code 12/16/2017 A3487N19     Unit Number 12/16/2017 [id number]     Unit ABO 12/16/2017 A     Unit RH 12/16/2017 POS     Unit Dispense Status 12/16/2017 Presumed Trans     Hemoglobin A1C 12/15/2017 5 4     EAG 12/15/2017 108     Hemoglobin 12/15/2017 12 4     Hematocrit 12/15/2017 35 8     Protime 12/15/2017 13 7     INR 12/15/2017 1 05     PTT 12/15/2017 29     ABO Grouping 12/15/2017 A     Rh Factor 12/15/2017 Negative     Antibody Screen 12/15/2017 Negative     Specimen Expiration Date 12/15/2017 [id number]     Unit Product Code 12/15/2017 [id number]     Unit Number 12/15/2017 [id number]     Unit ABO 12/15/2017 A     [facility]     Unit Dispense Status 12/15/2017 Crossmatched     Unit Product Code 12/15/2017 [id number]     Unit Number 12/15/2017 [id number]     Unit ABO 12/15/2017 A     Unit RH 12/15/2017 NEG     Unit Dispense Status 12/15/2017 Crossmatched     POC Glucose 12/15/2017 103     Sodium 12/16/2017 135*    Potassium 12/16/2017 3 8     Chloride 12/16/2017 104     CO2 12/16/2017 23     Anion Gap 12/16/2017 8     BUN 12/16/2017 6     Creatinine 12/16/2017 0 40*    Glucose 12/16/2017 141*    Calcium 12/16/2017 8 3     eGFR 12/16/2017 104     POC Glucose 12/16/2017 122     Sodium 12/16/2017 137     Potassium 12/16/2017 4 0     Chloride 12/16/2017 106     CO2 12/16/2017 23     Anion Gap 12/16/2017 8     BUN 12/16/2017 6     Creatinine 12/16/2017 0 33*    Glucose 12/16/2017 106     Calcium 12/16/2017 8 6     eGFR 12/16/2017 110     WBC 12/16/2017 11 13*    RBC 12/16/2017 3 66*    Hemoglobin 12/16/2017 11 4*    Hematocrit 12/16/2017 32 4*    MCV 12/16/2017 89     MCH 12/16/2017 31 1     MCHC 12/16/2017 35 2     RDW 12/16/2017 12 9     Platelets 13/06/4285 336     MPV 12/16/2017 8 8*    Calcium, Ionized 12/16/2017 1 13     Phosphorus 12/16/2017 2 8     Magnesium 12/16/2017 2 1        Imaging Studies: I have personally reviewed pertinent reports     and I have personally reviewed pertinent films in PACS

## 2017-12-16 NOTE — PROGRESS NOTES
POST OP CHECK    Patient seen s/p left craniotomy w/ membrane stripping for SDH  2 drains were left in place, a subgleal and subdural  Patient is restless in bed, speaking/mumbling with dysarthric speech and trying to climb out of bed  Karoline spivey applied  Neuro: Dysarthric speech unchanged from prior to OR  Motor: Follow commands in all 4 extremities  HEENT: Right eye with lateral deviation and drooping also unchanged from baseline  Pupils equal and reactive  Incision w/ staples c/d/i  CHANDRAKANT w/ sanignous output   Subdural drain to -5mmHg    Vitals:    12/15/17 1930   BP: 114/58   Pulse: 82   Resp: 16   Temp:    SpO2: 100%       A/P:  - Keep SBP <160  - Frequent reorientation  - Q1 neurochecks  - Monitor CHANDRAKANT output, subdural drain to -5mmHg  - Keppra 500 q12  - Keep NPO until S&S evaluation tomorrow

## 2017-12-16 NOTE — PLAN OF CARE
Problem: SLP ADULT - SWALLOWING, IMPAIRED  Goal: Initial SLP swallow eval performed  Outcome: Completed Date Met: 12/16/17

## 2017-12-16 NOTE — SPEECH THERAPY NOTE
Speech/Language Pathology  Assessment    Patient Name: Enrique CONNER Date: 12/16/2017     Problem List  Patient Active Problem List   Diagnosis    [facility]cere[facility] [address] [facility]    Weakness of extremity    Hyponatremia    SAH [facility]subarachnoid hemorrhage) ([facility] [address] [facility]    SAH [facility]arachnoid hemorrhage) ([facility] [address] [facility]    Lacunar infarct, acute (HCC)    Left-sided weakness    Dysarthria    Debility    Subdural hematoma (HCC)    GERD (gastroesophageal reflux disease)     Past Medical History  Past Medical History:   Diagnosis Date    Chronic sinusitis     GERD (gastroesophageal reflux disease)     Hypotension     Seizures ([facility] [address] )      Past Surgical History  Past Surgical History:   Procedure Laterality Date    ABDOMINAL SURGERY      CLOSED REDUCTION DISTAL FEMUR FRACTURE      CRANIOTOMY      HYSTERECTOMY      HYSTERECTOMY      LUNG LOBECTOMY Right     OOPHORECTOMY      SPLENECTOMY       67 y o  female who presents with dysarthria  Per [facility], the  states that this started this morning  The patient fell yesterday onto her backside at the food store yesterday  The patient's history includes a fall on 8/1 leading to left sided weakness and dysarthria  She came in as a trauma evaluation  CT head showed small pontine lacunar infract  It was recommended that she stay for further evaluation but she left AMA  She then returned 8/4 at insistence of family  Imaging at this time showed 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 and SAH  She was hospitalized and discharged to rehab  CT done on 8/23 showed b/l stable subdural hygromas  On 9/26 she presented for trauma evaluation following recurrent falls on ASA  CT head done then showed "increased chronic left hemispheric subdural hygroma and decreased R subdural hygroma"  Once again, patient and  insisted on leaving   They were instructed to follow up outpatient with neurosurgery       CT head was performed at [facility] showing large L acute on chronic subdural hematoma with local mass effect where her left chronic hygroma was   denies use of aspirin to Anabellebairon  Pt underwent Left fronto-temporal craniotomy for evacuation of subdural hematoma and membrane stripping 12/15/17  Reason for consult:  R/o aspiration  Determine safest and least restrictive diet  Recent neurosx  Precautions:  seizure  Current diet:  NPO  Premorbid diet[de-identified]  Regular w/ thin per pt  Previous VBS:  -  O2 requirement:  nc  Voice/Speech:  Mild/mod dysarthria, slow, writhing speech but if pt is loud is intelligible for conversation  Social:  Lives at home w/ her  Braden Scale commands:                       yes   Cognitive Status:  Alert, oriented to place, general reason for adm  Oral mech exam:  Full dentition-? Missing   Full symmetry- slow movements, no gross asymmetry  Decreased lingual propulsion/lateralization  Movements mildly ataxic         Items administered:  Puree, soft solid, honey thick liquid, nectar thick liquid, thin liquids  Liquids were taken by cup/straw/tsp  Oral stage:  Lip closure: fair  Mastication: slow, open mouth/decreased closure w/ mastication but fair  Bolus formation: wfl, slow but appropriate  Bolus control: wfl  Transfer:timely  Oral residue: mild lingual residue, able to 2* swallow to clear  Pharyngeal stage:  Swallow promptness: fairly prompt  Laryngeal rise: mildly decreased  Wet voice: -none noted  Throat clear: none   Cough: no coughing  Secondary swallows: for alls olids  Audible swallows: pt w/ audible inspiratory wheeze when speaking, no wet voicing,       Esophageal stage:  Pt reports GERD at home, did not mention taking anything at home  Summary:  Pt presents w/ mild oropharyngeal dysphagia w/ decreased oral movement/strngth, weak laryngeal rise  No immed overt s/s aspiration noted w/ controlled trials       Recommendations:  Diet: level 3dysphagia adv  Liquid: thin, ok by cup and small sip  Meds: as tolerated, try in puree first  Positioning:Upright  Strategies: upright, small controlled amts at a time  Aspiration precautions      Aspiration precautions posted    Results d/w:  Pt,  physician, nsg  Consider consult w/:  Nutrition-pt reporting she was losing weight & not eating much at home    Goal(s):    Pt will tolerate least restrictive diet w/out s/s aspiration or oral/pharyngeal difficulties

## 2017-12-16 NOTE — PROGRESS NOTES
Progress Note - Critical Care   Michael Herman 67 y o  female MRN: [id number]  Unit/Bed#: ICU 10 Encounter: [id number]    Assessment: 78yo F presenting to the [facility] ED with worsening dysarthria which started two days afo  Patient had a fall three days ago at the grocery store, was found to have an acute on chronic L subdural hematoma with significant midline shift, h/o stroke and left hospital AMA at that time    Plan:          Neuro:    -Pain: patient has no complaints at this time   - Patient had drainage of her SDH yesterday in OR, currently has drain in place, 65mL drained over 24hr  - q1hr neuro checks   - Repeat CT this am    - keppra 500mg bid per neurosurg   - patient alert and only oriented to self and month this am, change from her initial presentation                 CV:    - patient's BP maintained >120, will attempt to keep it <160      -will follow neurosurgs recs   - no cardiac hx, will continue to monitor                 Lung:    - patient's lungs are clear, no complaints at this time                 GI:    -not complaining of any nausea or pain, will continue to evaluate   - colace as bowel regimen                 FEN:    - isolyte at 75mL/hr as patient is currently NPO   - BMP was unremarkable this am     - patient failed swallow eval for dysarthria, will have repeat today, patient can then have regular diet  :    -  2L UOP, 2336 in IV and blood, +186mL  l Had 65mL out of drain over 24hr  - no complaints at this time, will continue to evaluate, no alexis at this time      - UA negative for infection in the ED                 ID:    - Patient on ppx vanco per neurosurg, day #2                 Heme:    - Morning lab showed Hb of 11 4 and WBC of 11 13   - patient not on any dvt ppx due to [address] pending repeat CT                 Endo: will continue to monitor patient's blood sugars                            Msk/Skin: turning in bed with PT/OT evaluation                 Disposition: Continued ICU stay, possibly to stepdown  follow neurosurg recs    Chief Complaint: Pain in wrists from her restraints    HPI/24hr events: Surgical drainage of ICH    Physical Exam   Constitutional: She is oriented to person, place, and time  She appears well-developed and well-nourished  No distress  HENT:   Head: Normocephalic and atraumatic  Eyes: Conjunctivae and EOM are normal  No scleral icterus  R eye deviated laterally, pupils equal but not responsive, this is apparently her baseline  Neck: Normal range of motion  Neck supple  Cardiovascular: Normal rate, regular rhythm and normal heart sounds  No murmur heard  Pulmonary/Chest: Effort normal and breath sounds normal  No respiratory distress  Abdominal: Soft  Bowel sounds are normal  There is no tenderness  Musculoskeletal: Normal range of motion  Neurological: She is alert and oriented to person, place, and time  Skin: Skin is warm and dry  Psychiatric: She has a normal mood and affect  Her behavior is normal    Nursing note and vitals reviewed  Vitals:    17 0000 17 0100 17 0200 17 0300   BP: 115/60 113/56 131/78 119/67   Pulse: 92 74 74 78   Resp: (!) 33 15 16 15   Temp: 97 5 °F (36 4 °C)      TempSrc: Oral      SpO2: 93% 100%  100%   Weight:       Height:         Arterial Line BP: 126/86  Arterial Line MAP (mmHg): 274 mmHg    Temperature:   Temp (24hrs), Av 3 °F (36 8 °C), Min:97 3 °F (36 3 °C), Max:99 °F (37 2 °C)    Current: Temperature: 97 5 °F (36 4 °C)    Weights:   IBW: 61 6 kg    Body mass index is 19 68 kg/m²    Weight (last 2 days)     Date/Time   Weight    12/15/17 0350  57 (125 66)    17 22:53:25  59 (130)              Hemodynamic Monitoring:  PAP:       Non-Invasive/Invasive Ventilation Settings:  Respiratory    Lab Data (Last 4 hours)    None         O2/Vent Data (Last 4 hours)    None              No results found for: PHART, SLT7CNY, PO2ART, BRN4WOA, Q2VGCJPJ, BEART, SOURCE  SpO2: SpO2: 100 %      Intake/Output Summary (Last 24 hours) at 12/16/17 0647  Last data filed at 12/16/17 0201   Gross per 24 hour   Intake          2336 25 ml   Output             2150 ml   Net           186 25 ml         Nutrition:        Diet Orders            Start     Ordered    12/15/17 0719  Diet NPO  Diet effective now     Comments:  Pending speech eval and clearance   Question Answer Comment   Diet Type NPO    RD to adjust diet per protocol? No        12/15/17 0718          Labs:     Results from last 7 days  Lab Units 12/16/17  0552 12/15/17  1501 12/15/17  0601  12/14/17  1954   WBC Thousand/uL 11 13*  --  6 83  --  7 63   HEMOGLOBIN g/dL 11 4* 12 4 10 6*  --  12 9   I STAT HEMOGLOBIN   --   --   --   < >  --    HEMATOCRIT % 32 4* 35 8 30 8*  --  38 2   PLATELETS Thousands/uL 336  --  284  --  383   NEUTROS PCT %  --   --  61  --   --    MONOS PCT %  --   --  9  --   --    < > = values in this interval not displayed  Results from last 7 days  Lab Units 12/15/17  2359 12/15/17  0601 12/14/17  2009 12/14/17 1954   SODIUM mmol/L 135* 144  --  139   POTASSIUM mmol/L 3 8 3 5  --  3 9   CHLORIDE mmol/L 104 116*  --  103   CO2 mmol/L 23 21  --  28   BUN mg/dL 6 9  --  12   CREATININE mg/dL 0 40* 0 31*  --  0 72   CALCIUM mg/dL 8 3 6 7*  --  8 9   GLUCOSE RANDOM mg/dL 141* 68  --  90   GLUCOSE, ISTAT mg/dl  --   --  86  --                 Results from last 7 days  Lab Units 12/15/17  1501 12/15/17  0601 12/14/17  1954   INR  1 05 1 20* 1 01   PTT seconds 29  --  29           0  Lab Value Date/Time   TROPONINI <0 02 12/14/2017 1954   TROPONINI 0 02 08/04/2017 1225   TROPONINI 0 16 (H) 08/02/2017 1456       Imaging:  I have personally reviewed pertinent reports  Micro:  No results found for: Pamelablaze Moreland, SPUTUMCULTUR    Allergies:    Allergies   Allergen Reactions    Acetazolamide      Other reaction(s): Unknown Allergic Reaction    Azithromycin      Other reaction(s): GI Upset    Barium Sulfate      Other reaction(s): GI Upset    Cefazolin     Loperamide      Other reaction(s): Unknown Allergic Reaction    Penicillins      Other reaction(s): Fever    Procaine Other (See Comments)     fever    Sulfa Antibiotics      fever       Medications:   Scheduled Meds:    chlorhexidine 15 mL Swish & Spit Q12H [address]   docusate sodium 100 mg Oral BID   levETIRAcetam 500 mg Intravenous Q12H [address]   levothyroxine 25 mcg Oral Early Morning   nicotine 1 patch Transdermal Daily   vancomycin 1,000 mg Intravenous Q12H     Continuous Infusions:    multi-electrolyte 75 mL/hr Last Rate: 75 mL/hr (12/15/17 0736)   sodium chloride 125 mL/hr    sodium chloride 75 mL/hr Last Rate: 75 mL/hr (12/16/17 0603)   sodium chloride 75 mL/hr      PRN Meds:    acetaminophen 650 mg Q4H PRN   bisacodyl 10 mg Daily PRN   fentanyl citrate (PF) 25 mcg Q1H PRN   HYDROmorphone 1 mg Q4H PRN   ondansetron 4 mg Q4H PRN   oxyCODONE 10 mg Q4H PRN       VTE Pharmacologic Prophylaxis: Reason for no pharmacologic prophylaxis ICH  VTE Mechanical Prophylaxis: sequential compression device    Invasive lines and devices: Invasive Devices     Peripheral Intravenous Line            Peripheral IV 12/14/17 Right Antecubital 1 day    Peripheral IV 12/15/17 Left Forearm 1 day          Arterial Line            Arterial Line 12/15/17 Left Radial less than 1 day          Drain            Closed/Suction Drain Left Head Bulb 10 Fr  less than 1 day    Urethral Catheter Double-lumen 16 Fr  less than 1 day    Ventriculostomy/Subdural Subdural drainage catheter Left Frontal region less than 1 day                   Counseling / Coordination of Care  Total Critical Care time spent 30 minutes excluding procedures, teaching and family updates  Code Status: Level 1 - Full Code     Portions of the record may have been created with voice recognition software    Occasional wrong word or "sound a like" substitutions may have occurred due to the inherent limitations of voice recognition software  Read the chart carefully and recognize, using context, where substitutions have occurred       Remo Soulier, MD

## 2017-12-17 ENCOUNTER — APPOINTMENT (INPATIENT)
Dept: RADIOLOGY | Facility: HOSPITAL | Age: 72
DRG: 025 | End: 2017-12-17
Payer: COMMERCIAL

## 2017-12-17 PROBLEM — R41.0 DELIRIUM: Status: ACTIVE | Noted: 2017-12-17

## 2017-12-17 LAB
ANION GAP SERPL CALCULATED.3IONS-SCNC: 6 MMOL/L (ref 4–13)
BUN SERPL-MCNC: 11 MG/DL (ref 5–25)
CALCIUM SERPL-MCNC: 8.6 MG/DL (ref 8.3–10.1)
CHLORIDE SERPL-SCNC: 105 MMOL/L (ref 100–108)
CO2 SERPL-SCNC: 26 MMOL/L (ref 21–32)
CREAT SERPL-MCNC: 0.46 MG/DL (ref 0.6–1.3)
ERYTHROCYTE [DISTWIDTH] IN BLOOD BY AUTOMATED COUNT: 13 % (ref 11.6–15.1)
GFR SERPL CREATININE-BSD FRML MDRD: 99 ML/MIN/1.73SQ M
GLUCOSE SERPL-MCNC: 109 MG/DL (ref 65–140)
GLUCOSE SERPL-MCNC: 121 MG/DL (ref 65–140)
GLUCOSE SERPL-MCNC: 156 MG/DL (ref 65–140)
GLUCOSE SERPL-MCNC: 82 MG/DL (ref 65–140)
GLUCOSE SERPL-MCNC: 85 MG/DL (ref 65–140)
HCT VFR BLD AUTO: 34.5 % (ref 34.8–46.1)
HGB BLD-MCNC: 12 G/DL (ref 11.5–15.4)
MCH RBC QN AUTO: 30.8 PG (ref 26.8–34.3)
MCHC RBC AUTO-ENTMCNC: 34.8 G/DL (ref 31.4–37.4)
MCV RBC AUTO: 89 FL (ref 82–98)
PLATELET # BLD AUTO: 341 THOUSANDS/UL (ref 149–390)
PMV BLD AUTO: 8.6 FL (ref 8.9–12.7)
POTASSIUM SERPL-SCNC: 3.6 MMOL/L (ref 3.5–5.3)
RBC # BLD AUTO: 3.89 MILLION/UL (ref 3.81–5.12)
SODIUM SERPL-SCNC: 137 MMOL/L (ref 136–145)
WBC # BLD AUTO: 12.36 THOUSAND/UL (ref 4.31–10.16)

## 2017-12-17 PROCEDURE — 85027 COMPLETE CBC AUTOMATED: CPT | Performed by: PHYSICIAN ASSISTANT

## 2017-12-17 PROCEDURE — 80048 BASIC METABOLIC PNL TOTAL CA: CPT | Performed by: PHYSICIAN ASSISTANT

## 2017-12-17 PROCEDURE — 82948 REAGENT STRIP/BLOOD GLUCOSE: CPT

## 2017-12-17 PROCEDURE — 70450 CT HEAD/BRAIN W/O DYE: CPT

## 2017-12-17 RX ORDER — POTASSIUM CHLORIDE 20MEQ/15ML
40 LIQUID (ML) ORAL ONCE
Status: COMPLETED | OUTPATIENT
Start: 2017-12-17 | End: 2017-12-17

## 2017-12-17 RX ORDER — LEVETIRACETAM 500 MG/1
500 TABLET ORAL EVERY 12 HOURS SCHEDULED
Status: DISCONTINUED | OUTPATIENT
Start: 2017-12-17 | End: 2017-12-20 | Stop reason: HOSPADM

## 2017-12-17 RX ADMIN — LEVETIRACETAM 500 MG: 100 INJECTION, SOLUTION INTRAVENOUS at 06:29

## 2017-12-17 RX ADMIN — LEVETIRACETAM 500 MG: 500 TABLET ORAL at 22:16

## 2017-12-17 RX ADMIN — LEVETIRACETAM 500 MG: 500 TABLET ORAL at 10:50

## 2017-12-17 RX ADMIN — LEVOTHYROXINE SODIUM 25 MCG: 25 TABLET ORAL at 06:07

## 2017-12-17 RX ADMIN — POTASSIUM CHLORIDE 40 MEQ: 20 SOLUTION ORAL at 09:28

## 2017-12-17 RX ADMIN — FAMOTIDINE 20 MG: 20 TABLET ORAL at 09:27

## 2017-12-17 RX ADMIN — FAMOTIDINE 20 MG: 20 TABLET ORAL at 18:06

## 2017-12-17 RX ADMIN — NICOTINE 1 PATCH: 14 PATCH, EXTENDED RELEASE TRANSDERMAL at 09:27

## 2017-12-17 RX ADMIN — DOCUSATE SODIUM 100 MG: 100 CAPSULE, LIQUID FILLED ORAL at 18:06

## 2017-12-17 RX ADMIN — DOCUSATE SODIUM 100 MG: 100 CAPSULE, LIQUID FILLED ORAL at 09:27

## 2017-12-17 NOTE — PROGRESS NOTES
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     Plan:  Neuro exam is stable- FREEMAN, Dysarthria, Confused  Drains out  Follow exam  SCD's, Hold SQH  Keppra    Subjective/Objective       Subjective: No complaints/Confused    Objective: Lying comfortable in bed        Invasive Devices     Peripheral Intravenous Line            Peripheral IV 12/15/17 Left Forearm 2 days                Physical Exam:  General Appearance:   Alert, cooperative, no distress, appears stated age  Head:    Normocephalic  Eyes:    PERRL, EOM's intact       Lungs:  Clear to auscultation bilaterally, respirations unlabored  Heart:    Regular rate and rhythm  Neurologic:CNII-XII intact  AAOx1-Confused to time and place, Dysarthric,PERRL, EOMI, FS, TML,   FREEMAN 4/5, No drift, No focal weakness  Inc C/D/I    Vitals: Blood pressure 154/67, pulse 84, temperature 97 6 °F (36 4 °C), temperature source Oral, resp  rate (!) 23, height 5' 7" (1 702 m), weight 57 kg (125 lb 10 6 oz), SpO2 (!) 81 %, not currently breastfeeding  ,Body mass index is 19 68 kg/m²      Hemodynamic Monitoring:  MAP: Arterial Line MAP (mmHg): 212 mmHg  CPP:    CVP:     ICP Mean:                Lab Results:  Admission on 12/14/2017   Component Date Value    WBC 12/15/2017 6 83     RBC 12/15/2017 3 42*    Hemoglobin 12/15/2017 10 6*    Hematocrit 12/15/2017 30 8*    MCV 12/15/2017 90     MCH 12/15/2017 31 0     MCHC 12/15/2017 34 4     RDW 12/15/2017 13 1     MPV 12/15/2017 8 7*    Platelets 45/96/1420 284     nRBC 12/15/2017 0     Neutrophils Relative 12/15/2017 61     Lymphocytes Relative 12/15/2017 27     Monocytes Relative 12/15/2017 9     Eosinophils Relative 12/15/2017 2     Basophils Relative 12/15/2017 1     Neutrophils Absolute 12/15/2017 4 18     Lymphocytes Absolute 12/15/2017 1 83     Monocytes Absolute 12/15/2017 0 60     Eosinophils Absolute 12/15/2017 0 16     Basophils Absolute 12/15/2017 0 04     Sodium 12/15/2017 144     Potassium 12/15/2017 3 5     Chloride 12/15/2017 116*    CO2 12/15/2017 21     Anion Gap 12/15/2017 7     BUN 12/15/2017 9     Creatinine 12/15/2017 0 31*    Glucose 12/15/2017 68     Calcium 12/15/2017 6 7*    eGFR 12/15/2017 113     Protime 12/15/2017 15 3*    INR 12/15/2017 1 20*    POC Glucose 12/15/2017 75     Unit Product Code 12/16/2017 M0012Z38     Unit Number 12/16/2017 [id number]     Unit ABO 12/16/2017 A     Unit [facility] 12/16/2017 POS     Unit Dispense Status 12/16/2017 Presumed Trans     Unit Product Code 12/16/2017 D4312M73     Unit Number 12/16/2017 [id number]     Unit ABO 12/16/2017 A     Unit RH 12/16/2017 POS     Unit Dispense Status 12/16/2017 Presumed Trans     Hemoglobin A1C 12/15/2017 5 4     EAG 12/15/2017 108     Hemoglobin 12/15/2017 12 4     Hematocrit 12/15/2017 35 8     Protime 12/15/2017 13 7     INR 12/15/2017 1 05     PTT 12/15/2017 29     MRSA Culture Only 12/16/2017 No Methicillin Resistant Staphlyococcus aureus (MRSA) isolated     ABO Grouping 12/15/2017 A     Rh Factor 12/15/2017 Negative     Antibody Screen 12/15/2017 Negative     Specimen Expiration Date 12/15/2017 [id number]     Unit Product Code 12/15/2017 B6303A99     Unit Number 12/15/2017 [id number]     Unit ABO 12/15/2017 A     Unit [facility] 12/15/2017 NEG     Unit Dispense Status 12/15/2017 Crossmatched     Unit Product Code 12/15/2017 K2315O12     Unit Number 12/15/2017 [id number]     Unit ABO 12/15/2017 A     Unit [facility] 12/15/2017 NEG     Unit Dispense Status 12/15/2017 Crossmatched     POC Glucose 12/15/2017 103     Sodium 12/16/2017 135*    Potassium 12/16/2017 3 8     Chloride 12/16/2017 104     CO2 12/16/2017 23     Anion Gap 12/16/2017 8     BUN 12/16/2017 6     Creatinine 12/16/2017 0 40*    Glucose 12/16/2017 141*    Calcium 12/16/2017 8 3     eGFR 12/16/2017 104     POC Glucose 12/16/2017 122     Sodium 12/16/2017 137     Potassium 12/16/2017 4 0     Chloride 12/16/2017 106     CO2 12/16/2017 23     Anion Gap 12/16/2017 8     BUN 12/16/2017 6     Creatinine 12/16/2017 0 33*    Glucose 12/16/2017 106     Calcium 12/16/2017 8 6     eGFR 12/16/2017 110     WBC 12/16/2017 11 13*    RBC 12/16/2017 3 66*    Hemoglobin 12/16/2017 11 4*    Hematocrit 12/16/2017 32 4*    MCV 12/16/2017 89     MCH 12/16/2017 31 1     MCHC 12/16/2017 35 2     RDW 12/16/2017 12 9     Platelets 21/88/7933 336     MPV 12/16/2017 8 8*    Calcium, Ionized 12/16/2017 1 13     Phosphorus 12/16/2017 2 8     Magnesium 12/16/2017 2 1     POC Glucose 12/16/2017 91     POC Glucose 12/16/2017 97     POC Glucose 12/17/2017 82     Sodium 12/17/2017 137     Potassium 12/17/2017 3 6     Chloride 12/17/2017 105     CO2 12/17/2017 26     Anion Gap 12/17/2017 6     BUN 12/17/2017 11     Creatinine 12/17/2017 0 46*    Glucose 12/17/2017 85     Calcium 12/17/2017 8 6     eGFR 12/17/2017 99     WBC 12/17/2017 12 36*    RBC 12/17/2017 3 89     Hemoglobin 12/17/2017 12 0     Hematocrit 12/17/2017 34 5*    MCV 12/17/2017 89     MCH 12/17/2017 30 8     MCHC 12/17/2017 34 8     RDW 12/17/2017 13 0     Platelets 20/85/9809 341     MPV 12/17/2017 8 6*       Imaging Studies: I have personally reviewed pertinent reports     and I have personally reviewed pertinent films in PACS

## 2017-12-17 NOTE — PROGRESS NOTES
ICU Transfer Note    HPI:  67 y o  female who presents with dysarthria & left hemineglect/weakness  Per [address] [place] [address] ED, the  states that this started this morning  The patient fell yesterday onto her backside at the food store yesterday  CT head was performed at [address] [place] [address] today showing left-sided holohemisphere subacute on chronic large complex subdural hematoma measuring 2 6 cm in thickness,  11 cm + in length, and 6 cm in height with 8 mm of midline shift    Hx of falls and admissions:   - 8/1-  She came in as a trauma evaluation  CT head showed small pontine lacunar infract  It was recommended that she stay for further evaluation but she left AMA  - 8/4 - returned at insistence of family  Imaging at this time showed 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 and SAH  She was hospitalized and discharged to rehab  CT done on 8/23 showed b/l stable subdural hygromas  -  9/26 she presented for trauma evaluation following recurrent falls on ASA  CT head done then showed "increased chronic left hemispheric subdural hygroma and decreased R subdural hygroma"  Once again, patient and  insisted on leaving  They were instructed to follow up outpatient with neurosurgery           ICU Course:   She was admitted 12/14 overnight, neurosurgery was promptly consulted upon admission  She was taken to the OR for left mini-craniotomy w/ membrane stripping in the AM on 12/15 (consent obtained from ) at which time a subgleal and subdural drain were placed  Her INR was first corrected with 2 FFP & vitamin K as it was 1 2 on admission  Post operatively she continued w/ dysarthria and some mild confusion  On 12/16 she underwent routine AM head CT per NSx, which showed overal decrease in size of subdural but a great proportion was hyperdense, suggeting interval bleeding  We continued to monitor  Later that evening she wriggled out of her restraints and managed to self-discontinue both her cranial drains   A stat CT was obtained that showed no new hemorrhage  Today 12/17 she underwent another routine CT to make sure no new/further changes were present  It too was stable, showing some acute and chronic blood components in L hemisphere  The mass effect of 6 mm was similar to prior  Today she remains confused (insists she is at home) and kept trying to climb out of bed despite her posey belt  For this reason she was put on 1:1 monitoring  Procedures:  12/15: L hemicraniotomy w/ membrane stripping & evacuation of subdural hematoma  12/16: Self-discontinuation of cranial CHANDRAKANT x2    Consults:  - Neurosurgery    Pending studies:  - None    Plan:    Neuro:   1  SDH with midline shift s/p L craniotomy  - Keppra x7d for seizure ppx  - Q4h neuro checks  2  Acute post-operative pain   - Has PRN pain meds ordered (oxy5/10, tylenol)  3  Encephalopathy, multifactorial and mainly due to #1   - Continue 1:1 observation  4  At risk for delirium  - Maintain sleep-wake cycle  - Geriatric consultation tomorrow  CV:   - No acute issues, goal SBP < 160 mmHg   Has PRNs ordered but hasn't required any in past 24 hrs  Lung:   - Pulm toilet with ICS/acapella  GI:   - Stress ulcer prophylaxis/Hx of GERD: Pepcid PO  - Bowel regimen: colace   FEN:   - Goal 24 hour fluid balance: autoregulate  - Nutrition/diet plan: dysphagia diet + Ensure  :   · Trend UOP and BUN/creat- no alexis  · Strict I and O  ID:   1  Leukocytosis, stable  · Continue trending WBC, no signs/sx of infection  Heme:   · Hold on DVT ppx  Endo:   · Glycemic control plan: N/A  · On home levothyroxine  MSK/Skin:  · Frequent turning and pressure off-loading  · PT/OT  Lines:  · Central venous access: none  · Other lines: PIV     VTE Prophylaxis:  · Pharmacologic Prophylaxis: Reason for no pharmacologic prophylaxis TBI  · Mechanical Prophylaxis: sequential compression device

## 2017-12-17 NOTE — PROGRESS NOTES
Progress Note - Critical Care   Elma Burgos 67 y o  female MRN: [id number]  Unit/Bed#: ICU 10 Encounter: [id number]    Assessment: 76yo F with history of falls presenting with dysarthria and left sided weakness, found to have large L acute on chronic subdural hematoma with local mass effect     Plan:          Neuro:  SDH s/p left craniectomy     - Pain: patient has no complaints at this time, has PRN oxy & tylenol ordered   - Drains x 2 self-discontinued by patient yesterday- scan at the time stable   - F/U repeat head CT this AM   - q1hr neuro checks   - keppra 500mg bid per neurosurg   - patient alert and only oriented to self and month this am, change from he initial presentationr                 CV:   Hemodynamics   - SBP remains <160, no PRNS given   - no cardiac hx, will continue to monitor                 Lung:    - patient's lungs are clear, no complaints at this time  Continue HOB elevation, aspiration precautions                 GI:    - BM x 1 yesterday   - colace as bowel regimen, has dulcolax prn                 FEN:    - No IVF   - Hypo K, will replete >4    - Passed S&S eval, on dyphagia 3 diet w/ thin liquid & Ensures                 :    - Olvera removed, 280 cc output + 3 episodes                 ID:    - No infectious concerns  WBC 12 36                 Heme:    - Hb 12   - No chemical VTE ppx, keep SCDs on                 Endo:    - BS controlled, no hx of DM                            Msk/Skin: turning in bed with PT/OT evaluation                 Disposition: Continued ICU stay, possibly to stepdown  follow neurosurg recs    Chief Complaint: A bit agitated when people keep telling her she's in the hospital- states she's at home in [facility]   Says she's not confused and "knows exactly what's going on"    HPI/24hr events:   - Patient self-D/C'd her cranial drains yesterday after wriggling out of restraints  - CT done at the time was negative for new bleed  - Patient remains confused- oriented to self, knows it is December, but thinks she is 78 yrs old and at home    Physical Exam    Physical Exam:   Gen: Awake, alert  NAD  HEENT: R eye deviated lateral  Pupils equal  Neuro: Oriented to self and partly to time however does not know where she is- insists she is in her home  GCS 15  Speech improved from prior eval, can understand what she is saying  Cardio: RRR  Lungs: CTAB  Abd: Soft, non distended, non tender  Ext: Motor/sensory intact in all 4 limbs    Vitals:    17 0205 17 0335 17 0445 17 0505   BP: 153/68 151/69 132/68 119/52   Pulse: 100 94 86 76   Resp: (!) 30 21 21 20   Temp:       TempSrc:       SpO2:  98% 97% 100%   Weight:       Height:         Arterial Line BP: 108/92  Arterial Line MAP (mmHg): 212 mmHg    Temperature:   Temp (24hrs), Av 8 °F (36 6 °C), Min:97 5 °F (36 4 °C), Max:98 2 °F (36 8 °C)    Current: Temperature: 97 6 °F (36 4 °C)    Weights:   IBW: 61 6 kg    Body mass index is 19 68 kg/m²  Weight (last 2 days)     Date/Time   Weight    12/15/17 0350  57 (125 66)              Hemodynamic Monitoring:  PAP:       Non-Invasive/Invasive Ventilation Settings:  Respiratory    Lab Data (Last 4 hours)    None         O2/Vent Data (Last 4 hours)    None              No results found for: PHART, QWX1GVA, PO2ART, CAN0VMT, L6XRWAJD, BEART, SOURCE  SpO2: SpO2: 100 %      Intake/Output Summary (Last 24 hours) at 17 0604  Last data filed at 17 1813   Gross per 24 hour   Intake              740 ml   Output              370 ml   Net              370 ml         Nutrition:        Diet Orders            Start     Ordered    17 0912  Diet Dysphagia/Modified Consistency; Dysphagia 3-Dental Soft;  Thin Liquid  Diet effective now     Comments:  Pending speech eval and clearance   Question Answer Comment   Diet Type Dysphagia/Modified Consistency    Dysphagia/Modified Consistency Dysphagia 3-Dental Soft    Liquid Modifier Thin Liquid    RD to adjust diet per protocol? No        12/16/17 0912          Labs:     Results from last 7 days  Lab Units 12/16/17  0552 12/15/17  1501 12/15/17  0601  12/14/17 1954   WBC Thousand/uL 11 13*  --  6 83  --  7 63   HEMOGLOBIN g/dL 11 4* 12 4 10 6*  --  12 9   I STAT HEMOGLOBIN   --   --   --   < >  --    HEMATOCRIT % 32 4* 35 8 30 8*  --  38 2   PLATELETS Thousands/uL 336  --  284  --  383   NEUTROS PCT %  --   --  61  --   --    MONOS PCT %  --   --  9  --   --    < > = values in this interval not displayed  Results from last 7 days  Lab Units 12/16/17  0552 12/15/17  2359 12/15/17  0601   SODIUM mmol/L 137 135* 144   POTASSIUM mmol/L 4 0 3 8 3 5   CHLORIDE mmol/L 106 104 116*   CO2 mmol/L 23 23 21   BUN mg/dL 6 6 9   CREATININE mg/dL 0 33* 0 40* 0 31*   CALCIUM mg/dL 8 6 8 3 6 7*   GLUCOSE RANDOM mg/dL 106 141* 68       Results from last 7 days  Lab Units 12/16/17  0552   MAGNESIUM mg/dL 2 1       Results from last 7 days  Lab Units 12/16/17  0552   PHOSPHORUS mg/dL 2 8        Results from last 7 days  Lab Units 12/15/17  1501 12/15/17  0601 12/14/17 1954   INR  1 05 1 20* 1 01   PTT seconds 29  --  29           0  Lab Value Date/Time   TROPONINI <0 02 12/14/2017 1954   TROPONINI 0 02 08/04/2017 1225   TROPONINI 0 16 (H) 08/02/2017 1456       Imaging:  I have personally reviewed pertinent reports  Micro:  No results found for: Sandra Hiss, SPUTUMCULTUR    Allergies:    Allergies   Allergen Reactions    Acetazolamide      Other reaction(s): Unknown Allergic Reaction    Azithromycin      Other reaction(s): GI Upset    Barium Sulfate      Other reaction(s): GI Upset    Cefazolin     Loperamide      Other reaction(s): Unknown Allergic Reaction    Penicillins      Other reaction(s): Fever    Procaine Other (See Comments)     fever    Sulfa Antibiotics      fever       Medications:   Scheduled Meds:    docusate sodium 100 mg Oral BID   famotidine 20 mg Oral BID   levETIRAcetam 500 mg Intravenous Q12H [address]   levothyroxine 25 mcg Oral Early Morning   nicotine 1 patch Transdermal Daily     Continuous Infusions:     PRN Meds:    acetaminophen 650 mg Q4H PRN   bisacodyl 10 mg Daily PRN   ondansetron 4 mg Q4H PRN   oxyCODONE 10 mg Q4H PRN   oxyCODONE 2 5 mg Q4H PRN       VTE Pharmacologic Prophylaxis: Reason for no pharmacologic prophylaxis ICH  VTE Mechanical Prophylaxis: sequential compression device    Invasive lines and devices: Invasive Devices     Peripheral Intravenous Line            Peripheral IV 12/15/17 Left Forearm 2 days                   Counseling / Coordination of Care  Total Critical Care time spent 30 minutes excluding procedures, teaching and family updates  Code Status: Level 1 - Full Code     Portions of the record may have been created with voice recognition software  Occasional wrong word or "sound a like" substitutions may have occurred due to the inherent limitations of voice recognition software  Read the chart carefully and recognize, using context, where substitutions have occurred       Deandre Young MD

## 2017-12-18 LAB
ABO GROUP BLD BPU: NORMAL
ABO GROUP BLD BPU: NORMAL
ANION GAP SERPL CALCULATED.3IONS-SCNC: 6 MMOL/L (ref 4–13)
BASOPHILS # BLD AUTO: 0.02 THOUSANDS/ΜL (ref 0–0.1)
BASOPHILS NFR BLD AUTO: 0 % (ref 0–1)
BPU ID: NORMAL
BPU ID: NORMAL
BUN SERPL-MCNC: 13 MG/DL (ref 5–25)
CA-I BLD-SCNC: 1.12 MMOL/L (ref 1.12–1.32)
CALCIUM SERPL-MCNC: 8.2 MG/DL (ref 8.3–10.1)
CHLORIDE SERPL-SCNC: 104 MMOL/L (ref 100–108)
CO2 SERPL-SCNC: 25 MMOL/L (ref 21–32)
CREAT SERPL-MCNC: 0.53 MG/DL (ref 0.6–1.3)
EOSINOPHIL # BLD AUTO: 0.14 THOUSAND/ΜL (ref 0–0.61)
EOSINOPHIL NFR BLD AUTO: 2 % (ref 0–6)
ERYTHROCYTE [DISTWIDTH] IN BLOOD BY AUTOMATED COUNT: 13.1 % (ref 11.6–15.1)
GFR SERPL CREATININE-BSD FRML MDRD: 94 ML/MIN/1.73SQ M
GLUCOSE SERPL-MCNC: 105 MG/DL (ref 65–140)
GLUCOSE SERPL-MCNC: 86 MG/DL (ref 65–140)
GLUCOSE SERPL-MCNC: 88 MG/DL (ref 65–140)
GLUCOSE SERPL-MCNC: 99 MG/DL (ref 65–140)
HCT VFR BLD AUTO: 32.3 % (ref 34.8–46.1)
HGB BLD-MCNC: 11 G/DL (ref 11.5–15.4)
LYMPHOCYTES # BLD AUTO: 1.95 THOUSANDS/ΜL (ref 0.6–4.47)
LYMPHOCYTES NFR BLD AUTO: 23 % (ref 14–44)
MAGNESIUM SERPL-MCNC: 2 MG/DL (ref 1.6–2.6)
MCH RBC QN AUTO: 30.3 PG (ref 26.8–34.3)
MCHC RBC AUTO-ENTMCNC: 34.1 G/DL (ref 31.4–37.4)
MCV RBC AUTO: 89 FL (ref 82–98)
MONOCYTES # BLD AUTO: 0.7 THOUSAND/ΜL (ref 0.17–1.22)
MONOCYTES NFR BLD AUTO: 8 % (ref 4–12)
NEUTROPHILS # BLD AUTO: 5.51 THOUSANDS/ΜL (ref 1.85–7.62)
NEUTS SEG NFR BLD AUTO: 67 % (ref 43–75)
NRBC BLD AUTO-RTO: 0 /100 WBCS
PHOSPHATE SERPL-MCNC: 2.8 MG/DL (ref 2.3–4.1)
PLATELET # BLD AUTO: 357 THOUSANDS/UL (ref 149–390)
PMV BLD AUTO: 8.9 FL (ref 8.9–12.7)
POTASSIUM SERPL-SCNC: 3.7 MMOL/L (ref 3.5–5.3)
RBC # BLD AUTO: 3.63 MILLION/UL (ref 3.81–5.12)
SODIUM SERPL-SCNC: 135 MMOL/L (ref 136–145)
UNIT DISPENSE STATUS: NORMAL
UNIT DISPENSE STATUS: NORMAL
UNIT PRODUCT CODE: NORMAL
UNIT PRODUCT CODE: NORMAL
UNIT RH: NORMAL
UNIT RH: NORMAL
WBC # BLD AUTO: 8.35 THOUSAND/UL (ref 4.31–10.16)

## 2017-12-18 PROCEDURE — 84100 ASSAY OF PHOSPHORUS: CPT | Performed by: PHYSICIAN ASSISTANT

## 2017-12-18 PROCEDURE — 82948 REAGENT STRIP/BLOOD GLUCOSE: CPT

## 2017-12-18 PROCEDURE — G8979 MOBILITY GOAL STATUS: HCPCS

## 2017-12-18 PROCEDURE — G8978 MOBILITY CURRENT STATUS: HCPCS

## 2017-12-18 PROCEDURE — 83735 ASSAY OF MAGNESIUM: CPT | Performed by: PHYSICIAN ASSISTANT

## 2017-12-18 PROCEDURE — 80048 BASIC METABOLIC PNL TOTAL CA: CPT | Performed by: PHYSICIAN ASSISTANT

## 2017-12-18 PROCEDURE — 85025 COMPLETE CBC W/AUTO DIFF WBC: CPT | Performed by: PHYSICIAN ASSISTANT

## 2017-12-18 PROCEDURE — G8988 SELF CARE GOAL STATUS: HCPCS

## 2017-12-18 PROCEDURE — 97163 PT EVAL HIGH COMPLEX 45 MIN: CPT

## 2017-12-18 PROCEDURE — G8987 SELF CARE CURRENT STATUS: HCPCS

## 2017-12-18 PROCEDURE — 97167 OT EVAL HIGH COMPLEX 60 MIN: CPT

## 2017-12-18 PROCEDURE — 82330 ASSAY OF CALCIUM: CPT | Performed by: PHYSICIAN ASSISTANT

## 2017-12-18 RX ORDER — MELATONIN
1000 DAILY
Status: DISCONTINUED | OUTPATIENT
Start: 2017-12-19 | End: 2017-12-20 | Stop reason: HOSPADM

## 2017-12-18 RX ORDER — POTASSIUM CHLORIDE 20MEQ/15ML
20 LIQUID (ML) ORAL ONCE
Status: COMPLETED | OUTPATIENT
Start: 2017-12-18 | End: 2017-12-18

## 2017-12-18 RX ORDER — OXYCODONE HYDROCHLORIDE 5 MG/1
2.5 TABLET ORAL EVERY 6 HOURS PRN
Status: DISCONTINUED | OUTPATIENT
Start: 2017-12-18 | End: 2017-12-19

## 2017-12-18 RX ORDER — ACETAMINOPHEN 325 MG/1
650 TABLET ORAL EVERY 8 HOURS SCHEDULED
Status: DISCONTINUED | OUTPATIENT
Start: 2017-12-18 | End: 2017-12-20 | Stop reason: HOSPADM

## 2017-12-18 RX ORDER — OXYCODONE HYDROCHLORIDE 5 MG/1
5 TABLET ORAL EVERY 6 HOURS PRN
Status: DISCONTINUED | OUTPATIENT
Start: 2017-12-18 | End: 2017-12-19

## 2017-12-18 RX ADMIN — LEVOTHYROXINE SODIUM 25 MCG: 25 TABLET ORAL at 05:33

## 2017-12-18 RX ADMIN — NICOTINE 1 PATCH: 14 PATCH, EXTENDED RELEASE TRANSDERMAL at 08:27

## 2017-12-18 RX ADMIN — ACETAMINOPHEN 650 MG: 325 TABLET, FILM COATED ORAL at 21:14

## 2017-12-18 RX ADMIN — FAMOTIDINE 20 MG: 20 TABLET ORAL at 08:27

## 2017-12-18 RX ADMIN — LEVETIRACETAM 500 MG: 500 TABLET ORAL at 21:14

## 2017-12-18 RX ADMIN — DOCUSATE SODIUM 100 MG: 100 CAPSULE, LIQUID FILLED ORAL at 17:36

## 2017-12-18 RX ADMIN — ACETAMINOPHEN 650 MG: 325 TABLET, FILM COATED ORAL at 14:36

## 2017-12-18 RX ADMIN — POTASSIUM CHLORIDE 20 MEQ: 20 SOLUTION ORAL at 11:59

## 2017-12-18 RX ADMIN — DOCUSATE SODIUM 100 MG: 100 CAPSULE, LIQUID FILLED ORAL at 08:27

## 2017-12-18 RX ADMIN — LEVETIRACETAM 500 MG: 500 TABLET ORAL at 08:27

## 2017-12-18 NOTE — ASSESSMENT & PLAN NOTE
-Keppra seizure ppx for 1week  -Q4 neuro checks  -hold SQH per neurosurgery  -drains are out  -CT 12/17 shows stable SDH and shift

## 2017-12-18 NOTE — PROGRESS NOTES
Patient transferred to P931 via bed, telemetry monitor with, pca, RN and 1:1 observer  Patient was cooperative, just finished eating 75% of meal, temp 98 2 oral, intake of 480cc  Patient was moved to new bed on P9  RN, pca and 1:1 observer in room for P9  Belongings and medications handed off to RN  Patient's  was updated via phone @ 20:00 on 12/17/17 with patient's transfer to [phone] room and he is aware his wife/patient will have a 1:1 observer in the room and she will connected to the cardiac monitor  He was given the unit phone number for updates [phone]

## 2017-12-18 NOTE — PLAN OF CARE
Problem: Potential for Falls  Goal: Patient will remain free of falls  INTERVENTIONS:  - Assess patient frequently for physical needs  -  Identify cognitive and physical deficits and behaviors that affect risk of falls  -  [place] fall precautions as indicated by assessment   - Educate patient/family on patient safety including physical limitations  - Instruct patient to call for assistance with activity based on assessment  - Modify environment to reduce risk of injury  - Consider OT/PT consult to assist with strengthening/mobility   Outcome: Progressing      Problem: Prexisting or High Potential for Compromised Skin Integrity  Goal: Skin integrity is maintained or improved  INTERVENTIONS:  - Identify patients at risk for skin breakdown  - Assess and monitor skin integrity  - Assess and monitor nutrition and hydration status  - Monitor labs (i e  albumin)  - Assess for incontinence   - Turn and reposition patient  - Assist with mobility/ambulation  - Relieve pressure over bony prominences  - Avoid friction and shearing  - Provide appropriate hygiene as needed including keeping skin clean and dry  - Evaluate need for skin moisturizer/barrier cream  - Collaborate with interdisciplinary team (i e  Nutrition, Rehabilitation, etc )   - Patient/family teaching   Outcome: Progressing      Problem: Nutrition/Hydration-ADULT  Goal: Nutrient/Hydration intake appropriate for improving, restoring or maintaining nutritional needs  Monitor and assess patient's nutrition/hydration status for malnutrition (ex- brittle hair, bruises, dry skin, pale skin and conjunctiva, muscle wasting, smooth red tongue, and disorientation)  Collaborate with interdisciplinary team and initiate plan and interventions as ordered  Monitor patient's weight and dietary intake as ordered or per policy  Utilize nutrition screening tool and intervene per policy   Determine patient's food preferences and provide high-protein, high-caloric foods as appropriate  INTERVENTIONS:  - Monitor oral intake, urinary output, labs, and treatment plans  - Assess nutrition and hydration status and recommend course of action  - Evaluate amount of meals eaten  - Assist patient with eating if necessary   - Allow adequate time for meals  - Recommend/ encourage appropriate diets, oral nutritional supplements, and vitamin/mineral supplements  - Order, calculate, and assess calorie counts as needed  - Recommend, monitor, and adjust tube feedings and TPN/PPN based on assessed needs  - Assess need for intravenous fluids  - Provide specific nutrition/hydration education as appropriate  - Include patient/family/caregiver in decisions related to nutrition   Outcome: Progressing      Problem: DISCHARGE PLANNING - CARE MANAGEMENT  Goal: Discharge to post-acute care or home with appropriate resources  INTERVENTIONS:  - Conduct assessment to determine patient/family and health care team treatment goals, and need for post-acute services based on payer coverage, community resources, and patient preferences, and barriers to discharge  - Address psychosocial, clinical, and financial barriers to discharge as identified in assessment in conjunction with the patient/family and health care team  - Arrange appropriate level of post-acute services according to patient's   needs and preference and payer coverage in collaboration with the physician and health care team  - Communicate with and update the patient/family, physician, and health care team regarding progress on the discharge plan  - Arrange appropriate transportation to post-acute venues   - Pt to d/c with appropriate resources when medically stable     Outcome: Progressing      Problem: PAIN - ADULT  Goal: Verbalizes/displays adequate comfort level or baseline comfort level  Interventions:  - Encourage patient to monitor pain and request assistance  - Assess pain using appropriate pain scale  - Administer analgesics based on type and severity of pain and evaluate response  - Implement non-pharmacological measures as appropriate and evaluate response  - Consider cultural and social influences on pain and pain management  - Notify physician/advanced practitioner if interventions unsuccessful or patient reports new pain   Outcome: Progressing      Problem: INFECTION - ADULT  Goal: Absence or prevention of progression during hospitalization  INTERVENTIONS:  - Assess and monitor for signs and symptoms of infection  - Monitor lab/diagnostic results  - Monitor all insertion sites, i e  indwelling lines, tubes, and drains  - Monitor endotracheal (as able) and nasal secretions for changes in amount and color  - [place] appropriate cooling/warming therapies per order  - Administer medications as ordered  - Instruct and encourage patient and family to use good hand hygiene technique  - Identify and instruct in appropriate isolation precautions for identified infection/condition   Outcome: Progressing    Goal: Absence of fever/infection during neutropenic period  INTERVENTIONS:  - Monitor WBC  - Implement neutropenic guidelines   Outcome: Progressing      Problem: SAFETY ADULT  Goal: Maintain or return to baseline ADL function  INTERVENTIONS:  -  Assess patient's ability to carry out ADLs; assess patient's baseline for ADL function and identify physical deficits which impact ability to perform ADLs (bathing, care of mouth/teeth, toileting, grooming, dressing, etc )  - Assess/evaluate cause of self-care deficits   - Assess range of motion  - Assess patient's mobility; develop plan if impaired  - Assess patient's need for assistive devices and provide as appropriate  - Encourage maximum independence but intervene and supervise when necessary  ¯ Involve family in performance of ADLs  ¯ Assess for home care needs following discharge   ¯ Request OT consult to assist with ADL evaluation and planning for discharge  ¯ Provide patient education as appropriate Outcome: Progressing    Goal: Maintain or return mobility status to optimal level  INTERVENTIONS:  - Assess patient's baseline mobility status (ambulation, transfers, stairs, etc )    - Identify cognitive and physical deficits and behaviors that affect mobility  - Identify mobility aids required to assist with transfers and/or ambulation (gait belt, sit-to-stand, lift, walker, cane, etc )  - [place] fall precautions as indicated by assessment  - Record patient progress and toleration of activity level on Mobility SBAR; progress patient to next Phase/Stage  - Instruct patient to call for assistance with activity based on assessment  - Request Rehabilitation consult to assist with strengthening/weightbearing, etc    Outcome: Progressing      Problem: DISCHARGE PLANNING  Goal: Discharge to home or other facility with appropriate resources  INTERVENTIONS:  - Identify barriers to discharge w/patient and caregiver  - Arrange for needed discharge resources and transportation as appropriate  - Identify discharge learning needs (meds, wound care, etc )  - Arrange for interpretive services to assist at discharge as needed  - Refer to Case Management Department for coordinating discharge planning if the patient needs post-hospital services based on physician/advanced practitioner order or complex needs related to functional status, cognitive ability, or social support system   Outcome: Progressing      Problem: Knowledge Deficit  Goal: Patient/family/caregiver demonstrates understanding of disease process, treatment plan, medications, and discharge instructions  Complete learning assessment and assess knowledge base    Interventions:  - Provide teaching at level of understanding  - Provide teaching via preferred learning methods   Outcome: Progressing

## 2017-12-18 NOTE — SOCIAL WORK
CM spoke to pt's  regarding d/c plan  Pt is recommended for rehab  Pt's  is unsure if he wants rehab or to take her home  He will come to the hospital today and discuss with pt

## 2017-12-18 NOTE — SOCIAL WORK
CM spoke to pt and her  regarding d/c plan  They are accepting of rehab and requested referrals in [facility]   CM reached out to facilities and will follow up

## 2017-12-18 NOTE — PLAN OF CARE
Problem: PHYSICAL THERAPY ADULT  Goal: Performs mobility at highest level of function for planned discharge setting  See evaluation for individualized goals  Treatment/Interventions: Functional transfer training, LE strengthening/ROM, Elevations, Therapeutic exercise, Endurance training, Patient/family training, Equipment eval/education, Bed mobility, Gait training, Spoke to MD, Spoke to nursing, Spoke to case management, OT  Equipment Recommended: Jessika Souza (? need for AFO)       See flowsheet documentation for full assessment, interventions and recommendations  Prognosis: Fair  Problem List: Decreased strength, Decreased endurance, Impaired balance, Decreased mobility, Decreased coordination, Decreased cognition, Decreased safety awareness, Impaired vision, Orthopedic restrictions, Pain, Decreased skin integrity  Assessment: Pt is a 67year old female presenting as a transfer from [facility] with dysarthria  Pt with fall on backside 12/13/17  Pt underwent Left fronto-temporal craniotomy for evacuation of subdural hematoma and membrane stripping 12/15/17  PT consulted to assess functional mobility and assist with safe d/c planning  PT eval performed POD #3 with OOB orders and seizure precautions  Pt with a problem list which includes CVA, weakness of extremity, hyponatremia, SAH, lacunar infarct, dysarthria, debility, SDH and delirium  PMH includes chronic sinusitis, GERD, hypotension and seizures  H&P reports pt with a recent admission to [facility] in which she was d/c to rehab  PTA, pt living at home with her   CM notes confirm pt living in a [facility] home with [address] and a flight of stairs to bedroom  Pt uses a RW and owns a SPC and BSC  Pt receives some assistance for ADLs  On eval, pt presenting with the following deficits: impaired balance, decreased strength and ROM, impaired tolerance to activity, impaired cognition, impaired coordination and decreased overall functional mobility   Pt required min/modA for all tasks including bed mobility, transfers and ambulation with a RW  See above for details  PT to continue to follow pt during stay to progress functional mobility (I) and safety  Rehab recommended at this time to maximize function and reduce caregiver burden  Barriers to Discharge: Inaccessible home environment, Decreased caregiver support     Recommendation: Post acute IP rehab     PT - OK to Discharge: Yes (when medically appropriate to rehab )    See flowsheet documentation for full assessment

## 2017-12-18 NOTE — OCCUPATIONAL THERAPY NOTE
[address] Evaluation     Patient Name: Darren Martínez  [id number] Date: 12/18/2017  Problem List  Patient Active Problem List   Diagnosis    CVA (cerebral vascular accident) ([facility] [address]    Weakness of extremity    Hyponatremia    SAH (subarachnoid hemorrhage) ([facility] [address]    SAH (subarachnoid hemorrhage) ([facility] [address]    Lacunar infarct, acute ([facility] [address]    Left-sided weakness    Dysarthria    Debility    Subdural hematoma (HCC)    GERD (gastroesophageal reflux disease)    Delirium     Past Medical History  Past Medical History:   Diagnosis Date    Chronic sinusitis     GERD (gastroesophageal reflux disease)     Hypotension     Seizures ([facility] [address] [facility]      Past Surgical History  Past Surgical History:   Procedure Laterality Date    ABDOMINAL SURGERY      CLOSED REDUCTION DISTAL FEMUR FRACTURE      CRANIOTOMY      CRANIOTOMY Left 12/15/2017    Procedure: Left fronto-temporal craniotomy for evacuation of subdural hematoma and membrane stripping;  Surgeon: Kelby Perez MD;  [facility];  Service: Neurosurgery    HYSTERECTOMY      HYSTERECTOMY      LUNG LOBECTOMY Right     OOPHORECTOMY      SPLENECTOMY           12/18/17 0911   Note Type   Note type Eval/Treat   Restrictions/Precautions   Weight Bearing Precautions Per Order No   Other Precautions Chair Alarm;1:1;Bed Alarm; Fall Risk;Cognitive   Pain Assessment   Pain Score No Pain   Home Living   Type of Home House   Home Layout Two level   Bathroom Shower/Tub Tub/shower unit   Bathroom Toilet Standard   Bathroom Equipment Commode   Bathroom Accessibility Accessible   Home Equipment Walker;Cane   Prior Function   Level of [place] Independent with ADLs and functional mobility; Needs assistance with IADLs   Lives With Spouse   Receives Help From Family   ADL Assistance Independent   IADLs Needs assistance   Falls in the last 6 months 1 to 4   Vocational Retired   [address] [place] [address] Relationships SUPPORTIVE FAMILY   Service to Others RETIRED   Intrinsic Gratification MOSTLY SEDENTARY   Subjective   Subjective OFFERS NO C/O   ADL   Eating Assistance 5  Supervision/Setup   Grooming Assistance 5  Supervision/Setup   UB Bathing Assistance 4  Minimal Assistance   LB Bathing Assistance 3  Moderate Assistance   UB Dressing Assistance 4  Minimal Assistance   LB Dressing Assistance 3  Moderate Assistance   Toileting Assistance  2  Maximal Assistance   Bed Mobility   Supine to Sit 4  Minimal assistance   Transfers   Sit to Stand 4  Minimal assistance   Stand to Sit 4  Minimal assistance   Stand pivot 3  Moderate assistance   Toilet transfer 3  Moderate assistance   Additional Comments MOVES VERY SLOWLY - REQUIRES INCREASED TIME AND MODERATE CUES T/O FOR CARRYOVER WITH SAFE TECH AND SAFE USE OF RW   Functional Mobility   Functional Mobility 3  Moderate assistance   Additional items Rolling walker   Balance   Static Sitting Fair   Dynamic Sitting Fair -   Static Standing Fair -   Dynamic Standing Poor   Ambulatory Poor   Activity Tolerance   Activity Tolerance Patient limited by fatigue;Treatment limited secondary to medical complications (Comment)   RUE Assessment   RUE Assessment WFL   LUE Assessment   LUE Assessment WFL   Cognition   Overall Cognitive Status Impaired   Arousal/Participation Alert   Attention Attends with cues to redirect   Orientation Level Oriented to person;Oriented to place   Memory Decreased short term memory;Decreased recall of recent events;Decreased recall of precautions   Following Commands Follows one step commands with increased time or repetition   Comments SLOW TO PROCESS/RESPOND   Assessment   Limitation Decreased ADL status; Decreased Safe judgement during ADL;Decreased cognition;Decreased endurance;Decreased self-care trans;Decreased high-level ADLs   Prognosis Good;Fair   Assessment Pt is a 67 y o  female who was [facility] to [facility] on 12/14/2017 with Subdural hematoma (HCC) s/p fall   Pt's problem list also includes PMH of underlying neurological disorder, limited vision, previous surgery, limited cognition and cva, chronic sinusitis, GERD, hypotension, seizures  At baseline pt was completing adls independently and ambulating with rw  Pt lives with spouse in 1 story home  Currently pt requires moderate assist for overall ADLS and moderate assist for functional mobility/transfers  Pt currently presents with impairments in the following categories -steps to enter environment, difficulty performing ADLS, difficulty performing IADLS , limited insight into deficits, compliance, flat affect, decreased initiation and engagement  and health management  activity tolerance, endurance, standing balance/tolerance, memory, insight, safety , judgement  and attention   These impairments, as well as pt's risk for falls  limit pt's ability to safely engage in all baseline areas of occupation, includingbathing, dressing, toileting, functional mobility/transfers, community mobility, social participation  and leisure activities  From OT standpoint, recommend inpt rehab upon D/C  OT will continue to follow to address the below stated goals  Goals   Patient Goals none stated   LTG Time Frame 10-14   Long Term Goal #1 refer to established goals below   Plan   Treatment Interventions ADL retraining;Functional transfer training; Endurance training;Cognitive reorientation;Patient/family training;Equipment evaluation/education; Compensatory technique education; Activityengagement   Goal Expiration Date 01/01/18   OT Frequency 3-5x/wk   Recommendation   OT Discharge Recommendation Short Term Rehab   Barthel Index   Feeding 5   Bathing 0   Grooming Score 5   Dressing Score 5   Bladder Score 10   Bowels Score 10   Toilet Use Score 5   Transfers (Bed/Chair) Score 10   Mobility (Level Surface) Score 0   Stairs Score 0   Barthel Index Score 50       OCCUPATIONAL THERAPY GOALS:    *I FEEDING/GROOMING AFTER SETUP   *SBA  ADLS AFTER SETUP WITH USE OF ADAPTIVE DEVICES PRN  *SBA TOILETING AND CLOTHING MANAGEMENT   *SBA FUNCTIONAL MOB AND TRANSFERS TO ALL SURFACES WITH FAIR TO FAIR+ BALANCE/SAFETY FOR PARTICIPATION IN DYNAMIC ADLS   *DEMONSTRATE FAIR TO FAIR+ CARRYOVER WITH SAFE USE OF RW DURING FUNCTIONAL TASKS  *ASSESS DME NEEDS  *INCREASE ACTIVITY TOLERANCE TO 35-40 MIN FOR PARTICIPATION IN ADLS AND ENJOYABLE ACTIVITIES     *PT TO PARTICIPATE IN ONGOING FUNCTIONAL COGNITIVE ASSESSMENT WITH GOOD ATTENTION/PARTICIPATION TO ASSIST WITH SAFE D/C RECOMMENDATIONS     Tatum Espinosa, OT

## 2017-12-18 NOTE — PHYSICAL THERAPY NOTE
Physical Therapy Evaluation    Patient's Name: [facility]    Admitting Diagnosis  Unspecified multiple injuries, initial encounter [T07  XXXA]    Problem List  Patient Active Problem List   Diagnosis    [facility]) ([facility] [address] [facility]    Weakness of extremity    Hyponatremia    SAH [facility]arachnoid hemorrhage) ([facility] [address] [facility]    SAH ([facility]arachnoid hemorrhage) ([facility] [address] [facility]    Lacunar infarct, acute ([facility] [address] [facility]    Left-sided weakness    Dysarthria    Debility    Subdural hematoma (HCC)    GERD (gastroesophageal reflux disease)    Delirium       Past Medical History  Past Medical History:   Diagnosis Date    Chronic sinusitis     GERD (gastroesophageal reflux disease)     Hypotension     Seizures (HCC)        Past Surgical History  Past Surgical History:   Procedure Laterality Date    ABDOMINAL SURGERY      CLOSED REDUCTION DISTAL FEMUR FRACTURE      CRANIOTOMY      CRANIOTOMY Left 12/15/2017    Procedure: Left fronto-temporal craniotomy for evacuation of subdural hematoma and membrane stripping;  Surgeon: Kaylee Helms MD;  [facility];  Service: Neurosurgery    HYSTERECTOMY      HYSTERECTOMY      LUNG LOBECTOMY Right     OOPHORECTOMY      SPLENECTOMY          12/18/17 0910   Note Type   Note type Eval/Treat   Pain Assessment   Pain Assessment FLACC   Pain Rating: FLACC (Rest) - Face 0   Pain Rating: FLACC (Rest) - Legs 0   Pain Rating: FLACC (Rest) - Activity 0   Pain Rating: FLACC (Rest) - Cry 0   Pain Rating: FLACC (Rest) - Consolability 0   Score: FLACC (Rest) 0   Pain Rating: FLACC (Activity) - Face 1   Pain Rating: FLACC (Activity) - Legs 0   Pain Rating: FLACC (Activity) - Activity 0   Pain Rating: FLACC (Activity) - Cry 0   Pain Rating: FLACC (Activity) - Consolability 0   Score: FLACC (Activity) 1   Home Living   Type of Home House   Home Layout Two level  ([address]; flight to bedroom)   Bathroom Shower/Tub Tub/shower unit   Bathroom Toilet Standard   Bathroom Equipment Commode   Bathroom Accessibility Accessible   Home Equipment Cane;Walker   Additional Comments Pt reports using a RW at baseline; note confirms    Prior Function   Level of [place] Independent with ADLs and functional mobility; Needs assistance with IADLs   Lives With Spouse   Receives Help From Family   ADL Assistance Independent   IADLs Needs assistance   Falls in the last 6 months 1 to 4  (at least 1)   Comments Pt lives at home with her spouse  She reports (I) with ADLs  Reports (I) with medication management  Reports  would help PRN    Restrictions/Precautions   Weight Bearing Precautions Per Order No  (OOB orders)   Other Precautions Chair Alarm; Bed Alarm;Cognitive;1:1;Fall Risk;Pain   General   Additional Pertinent History 12/15/17 Left fronto-temporal craniotomy for evacuation of subdural hematoma and membrane stripping    Family/Caregiver Present No   Cognition   Overall Cognitive Status Impaired   Arousal/Participation Cooperative   Orientation Level Oriented to person;Oriented to place;Oriented to situation;Disoriented to time   Memory Decreased recall of recent events;Decreased short term memory;Decreased recall of precautions   Following Commands Follows one step commands with increased time or repetition   Comments Pt slow to process and requires increased time   Pt AxO to all except date which she reported Janurary 1st     RUE Assessment   RUE Assessment WFL  (? neglect; functional grasp )   LUE Assessment   LUE Assessment WFL   RLE Assessment   RLE Assessment X  (functional deficits noted grossly 3/5)   LLE Assessment   LLE Assessment WFL   Coordination   Movements are Fluid and Coordinated 0   Coordination and Movement Description bradykinetic; ataxic    Sensation WFL   Light Touch   RLE Light Touch Grossly intact   LLE Light Touch Grossly intact   Proprioception   RLE Proprioception Impaired   LLE Proprioception Grossly Intact   Bed Mobility   Supine to Sit 4  Minimal assistance   Additional items Assist x 1;Increased time required;Verbal cues;LE management   Additional Comments Pt sat EOB with CGA; no LOB noted  Increased time required  No reports of dizziness or light headedness   Transfers   Sit to Stand 4  Minimal assistance   Additional items Assist x 1   Stand to Sit 4  Minimal assistance   Additional items Assist x 1   Stand pivot 3  Moderate assistance   Additional items Assist x 1   Toilet transfer 3  Moderate assistance   Additional items Assist x 1;Standard toilet  (L grab bar )   Additional Comments Pt performing sit<->stand from bed with Daiana and modA required for low surface commode  Assistacne requried to clean BM  Pt with minor LOB standing without UE support at sink  During ambulation with RW, pt required continued verbal instruction to approximate RW and stand within LUIS ANTONIO  At times, pt required assistance to advance RLE (denies use of brace at baseline)  During descent into chair, pt observed with absent eccentric control and did not demosntrate proper hand placement, increasing risk for falls    Ambulation/Elevation   Gait pattern R Foot drag;R Hemiparesis; Poor UE support;Decreased foot clearance;Shuffling; Short stride; Step to;Excessively slow  (R foot drag)   Gait Assistance 3  Moderate assist   Additional items Assist x 1   Assistive Device Rolling walker   Distance 7'x2   Stair Management Assistance Not tested   Balance   Static Sitting Fair   Dynamic Sitting Fair   Static Standing Fair -   Dynamic Standing Poor +   Ambulatory Poor +   Endurance Deficit   Endurance Deficit Yes   Endurance Deficit Description fatigue; ongoing deconditioning    Activity Tolerance   Activity Tolerance Patient limited by fatigue   Medical Staff Made Aware Andre Mesa, neurosurgery ELY; Col[facility] Shultz [facility]   Nurse Made Aware appropriate to see per RN   Assessment   Prognosis Fair   Problem List Decreased strength;Decreased endurance; Impaired balance;Decreased mobility; Decreased coordination;Decreased cognition;Decreased safety awareness; Impaired vision;Orthopedic restrictions;Pain;Decreased skin integrity   Assessment Pt is a 67year old female presenting as a transfer from [facility] with dysarthria  Pt with fall on backside 12/13/17  Pt underwent Left fronto-temporal craniotomy for evacuation of subdural hematoma and membrane stripping 12/15/17  PT consulted to assess functional mobility and assist with safe d/c planning  PT eval performed POD #3 with OOB orders and seizure precautions  Pt with a problem list which includes CVA, weakness of extremity, hyponatremia, SAH, lacunar infarct, dysarthria, debility, SDH and delirium  PMH includes chronic sinusitis, GERD, hypotension and seizures  H&P reports pt with a recent admission to [facility] in which she was d/c to rehab  PTA, pt living at home with her   CM notes confirm pt living in a [facility] home with [address] and a flight of stairs to bedroom  Pt uses a RW and owns a SPC and BSC  Pt receives some assistance for ADLs  On eval, pt presenting with the following deficits: impaired balance, decreased strength and ROM, impaired tolerance to activity, impaired cognition, impaired coordination and decreased overall functional mobility  Pt required min/modA for all tasks including bed mobility, transfers and ambulation with a RW  See above for details  PT to continue to follow pt during stay to progress functional mobility (I) and safety  Rehab recommended at this time to maximize function and reduce caregiver burden  Barriers to Discharge Inaccessible home environment;Decreased caregiver support   Goals   Patient Goals to eat breakfast    LTG Expiration Date 01/01/18   Long Term Goal #1 Pt will be S with bed mobility  Pt will be S with transfers  Pt will be S with ambulation using least restrictive AD  Pt will be CGA on stairs  Pt will particiapte in HEP  Pt's balance to improve to Fair (-)      Treatment Day 0   Plan   Treatment/Interventions Functional transfer training;LE strengthening/ROM; Elevations; Therapeutic exercise; Endurance training;Patient/family training;Equipment eval/education; Bed mobility;Gait training;Spoke to MD;Spoke to nursing;Spoke to case management;OT   PT Frequency Other (Comment)  (6x/wk)   Recommendation   Recommendation Post acute IP rehab   Equipment Recommended Walker  (? need for AFO)   PT - OK to Discharge Yes  (when medically appropriate to rehab )   Additional Comments OOB in chair with alarm and SCDs activated; 1:1 at bedside   Barthel Index   Feeding 5   Bathing 0   Grooming Score 5   Dressing Score 5   Bladder Score 10   Bowels Score 10   Toilet Use Score 5   Transfers (Bed/Chair) Score 10   Mobility (Level Surface) Score 0   Stairs Score 0   Barthel Index Score 50       Mary Mina, PT

## 2017-12-18 NOTE — CONSULTS
Consultation - Geriatric Medicine   Susan Gutierrez 67 y o  female MRN: [id number]  Unit/Bed#: [facility] 931-01 Encounter: [id number]      Assessment/Plan   67year old female with:    1  Delirium- multifactorial in the setting of SDH, fall/trauma, suspected pain, underlying cognitive impairment, polypharmacy  Improving  Pain control as below; untreated pain may precipitate delirium  Continued supportive care and management of acute and chronic medical conditions per primary team      Delirium precautions/treatment: Maintain normal sleep-wake cycle, allowing for minimally interrupted sleep and night; keep awake, alert, interactive during the day  Ensure adequate nutrition and hydration  Frequent reorientation and redirection  Provide sensory aids at bedside and encourage use  Pain control as below  Bowel regimen to avoid constipation  Avoid deliriogenic medications including tramadol, benzodiazepines, benadryl, sleep aids such as ativan or temazepam  For behaviors not redirectable by nonpharmacologic measures, may use zyprexa 2 5mg IM Q8h PRN  Consider 1:1 sitter rather than use of restraints if needed  2  Ambulatory dysfunction and fall- multifactorial; PT/OT, fall precautions; will restart Vitamin D3 1000 IU PO daily  3  Cognitive impairment- likely MCI; waxing and waning as an outpatient, in the setting of #6  Also with history of CVA  Folate, B12, TSH unremarkable when checked August 2017  Oriented to person, place, situation  Mini-Cog with 4/5 scoring in August  Patient would benefit from comprehensive geriatric assessment and formal cognitive testing outpatient- follow up at [facility]- info placed in discharge instructions  Delirium precautions as above  4  Polypharmacy- medications reviewed with recommendations as outlined  D/c famotidine in setting of delirium, may cause confusion; if has dyspepsia or GERD, may use a PPI instead in addition to dietary modification   D/c zofran, as patient has not required  5  Visual impairment- provide glasses at bedside and encourage use     6  Acute on chronic SDH- management per neurosurgery     7  Acute pain- in the postoperative setting and with recent fall  Will schedule acetaminophen 650mg PO Q8h; decrease OxyIR dosing to 2 5-5mg PO Q6h PRN for moderate and severe pain- plan to d/c OxyIR tomorrow if patient not requiring; see geriatric pain order set  8  Dysphagia- modified diet; aspiration precautions; speech following  9  Constipation- continue docusate and bisacodyl PRN; ensure adequate hydration     10  Deconditioning and debility- multifactorial in the setting of above; PT/OT, supportive care, nutritional support- agree with ensure supplementation     Spoke with patient and  at bedside; all questions answered  History of Present Illness   Physician Requesting Consult: Maria Fernanda Coleman DO  Reason for Consult / Principal Problem: Delirium   Hx and PE limited by: History obtained from patient along with record review; additional history obtained from  at bedside  HPI: Enrique Arellano is a 67y o  year old female who presents following a fall; admitted to trauma service  [facility] CVA, chronic SDH  Imaging on admission showed acute on chronic SDH; underwent evacuation per neurosurgery on 12/15/17  Patient is known to our service from August 2017  I have personally reviewed the patient's chart, including medications, imaging and labs  At baseline, patient was living at home with her  following discharge from acute rehab after hospitalization in August 2017  Patient is primarily independent in ADLs but requires IADL assistance; able to self-manage medications  Requires assistance for transfers, ambulating with the use of a roller walker    has noted a recent change in patient gait to slowed and shuffling and waxing and waning periods of confusion, aphasia and dysarthria;  feels these have greatly improved in the postoperative setting; "she's done a 360 "     Denies pain; has not required any PRN OxyIR  No constipation  Decreased PO intake overall, but is drinking Ensure  Inpatient consult to Gerontology  Consult performed by: [employer] ordered by: Yady Bonilla          Review of Systems   Constitutional: Positive for activity change and appetite change  Negative for unexpected weight change  HENT: Positive for voice change  Negative for dental problem, hearing loss and trouble swallowing  Eyes: Negative for discharge and visual disturbance  Respiratory: Negative for shortness of breath  Cardiovascular: Negative for palpitations and leg swelling  Gastrointestinal: Positive for constipation  Negative for abdominal pain and nausea  Genitourinary: Negative for difficulty urinating  Musculoskeletal: Positive for gait problem  Negative for arthralgias and myalgias  Skin: Negative for rash  Neurological: Positive for speech difficulty  Negative for dizziness and headaches  Psychiatric/Behavioral: Positive for confusion  Negative for sleep disturbance  The patient is not nervous/anxious  All other systems reviewed and are negative          Historical Information   Past Medical History:   Diagnosis Date    Chronic sinusitis     GERD (gastroesophageal reflux disease)     Hypotension     Seizures (HCC)      Past Surgical History:   Procedure Laterality Date    ABDOMINAL SURGERY      CLOSED REDUCTION DISTAL FEMUR FRACTURE      CRANIOTOMY      CRANIOTOMY Left 12/15/2017    Procedure: Left fronto-temporal craniotomy for evacuation of subdural hematoma and membrane stripping;  Surgeon: Bonnie Walls MD;  Location: [facility];  Service: Neurosurgery    HYSTERECTOMY      HYSTERECTOMY      LUNG LOBECTOMY Right     OOPHORECTOMY      SPLENECTOMY       Social History   History   Alcohol Use No     History   Drug Use No     History   Smoking Status    Current Every Day Smoker    Packs/day: 1 00    Types: Cigarettes   Smokeless Tobacco    Never Used     Family History: non-contributory    Meds/Allergies   all current active meds have been reviewed, current meds:   Current Facility-Administered Medications   Medication Dose Route Frequency    acetaminophen (TYLENOL) tablet 650 mg  650 mg Oral Q4H PRN    bisacodyl (DULCOLAX) rectal suppository 10 mg  10 mg Rectal Daily PRN    docusate sodium (COLACE) capsule 100 mg  100 mg Oral BID    famotidine (PEPCID) tablet 20 mg  20 mg Oral BID    levETIRAcetam (KEPPRA) tablet 500 mg  500 mg Oral Q12H [facility]    levothyroxine tablet 25 mcg  25 mcg Oral Early Morning    nicotine (NICODERM CQ) 14 mg/24hr TD 24 hr patch 1 patch  1 patch Transdermal Daily    ondansetron (ZOFRAN) injection 4 mg  4 mg Intravenous Q4H PRN    oxyCODONE (ROXICODONE) immediate release tablet 10 mg  10 mg Oral Q4H PRN    oxyCODONE (ROXICODONE) IR tablet 2 5 mg  2 5 mg Oral Q4H PRN    and PTA meds:   Prior to Admission Medications   Prescriptions Last Dose Informant Patient Reported? Taking?    Bioflavonoid Products (ASTRID C PO) Unknown at Unknown time  Yes No   Sig: Take 500 mg by mouth   Cyanocobalamin (B-12) 5000 MCG CAPS Unknown at Unknown time  Yes No   Sig: Take by mouth daily   Thyroid (LEVOTHYROXINE-LIOTHYRONINE) 30 MG TABS Unknown at Unknown time  Yes No   Sig: Take 30 mg by mouth 2 (two) times a day   calcium-vitamin D 250-100 MG-UNIT per tablet Unknown at Unknown time  Yes No   Sig: Take 1 tablet by mouth 2 (two) times a day   pseudoephedrine (SUDAFED) 30 mg tablet Unknown at Unknown time  Yes No   Sig: Take 60 mg by mouth every 4 (four) hours as needed for congestion      Facility-Administered Medications: None       Allergies   Allergen Reactions    Acetazolamide      Other reaction(s): Unknown Allergic Reaction    Azithromycin      Other reaction(s): GI Upset    Barium Sulfate      Other reaction(s): GI Upset    Cefazolin     Loperamide Other reaction(s): Unknown Allergic Reaction    Penicillins      Other reaction(s): Fever    Procaine Other (See Comments)     fever    Sulfa Antibiotics      fever       Objective     Intake/Output Summary (Last 24 hours) at 12/18/17 1318  Last data filed at 12/17/17 1938   Gross per 24 hour   Intake              720 ml   Output              550 ml   Net              170 ml     Invasive Devices     Peripheral Intravenous Line            Peripheral IV 12/15/17 Left Forearm 3 days                Physical Exam   Constitutional: She appears well-developed and well-nourished  No distress  HENT:   Head: Atraumatic  Right Ear: External ear normal    Left Ear: External ear normal    Mouth/Throat: Oropharynx is clear and moist  No oropharyngeal exudate  Poor dentition; left frontal-temporal incision intact   Eyes: Conjunctivae are normal  Right eye exhibits no discharge  Left eye exhibits no discharge  No scleral icterus  Disconjugate gaze    Neck: Normal range of motion  Neck supple  Cardiovascular: Normal rate, regular rhythm and normal heart sounds  Pulmonary/Chest: Effort normal and breath sounds normal  No respiratory distress  Abdominal: Soft  Bowel sounds are normal  She exhibits no distension  There is no tenderness  Musculoskeletal:   Able to lift both upper extremities against gravity   strength 3+/5 right hand    Neurological: She is alert  +dysarthria   Oriented to person, place, situation    Skin: Skin is warm and dry  She is not diaphoretic  Psychiatric: She has a normal mood and affect  Her behavior is normal  Thought content normal  Her speech is delayed and slurred  Cognition and memory are impaired  Nursing note and vitals reviewed        Lab Results:   Lab Results   Component Value Date    WBC 8 35 12/18/2017    RBC 3 63 (L) 12/18/2017    HGB 11 0 (L) 12/18/2017    HCT 32 3 (L) 12/18/2017    MCV 89 12/18/2017     12/18/2017     Lab Results   Component Value Date TMCFOHVA50 2,915 (H) 08/08/2017     No results found for: T3VKIJT, FREET4  No components found for: VITAMIND1, 25-DIHYDROXY  Lab Results   Component Value Date     (L) 12/18/2017    K 3 7 12/18/2017     12/18/2017    CO2 25 12/18/2017    ANIONGAP 6 12/18/2017    BUN 13 12/18/2017    CREATININE 0 53 (L) 12/18/2017    GLUCOSE 88 12/18/2017    GLUCOSE 86 12/14/2017    CALCIUM 8 2 (L) 12/18/2017    AST 24 08/05/2017    ALT 25 08/05/2017    ALKPHOS 75 08/05/2017    PROT 6 3 (L) 08/05/2017    ALBUMIN 3 0 (L) 08/05/2017    BILITOT 0 64 08/05/2017    EGFR 94 12/18/2017    EGFR 91 12/14/2017     Lab Results   Component Value Date    COLORU Yellow 12/14/2017    CLARITYU Clear 12/14/2017    SPECGRAV 1 015 12/14/2017    PHUR 6 0 12/14/2017    LEUKOCYTESUR Negative 12/14/2017    NITRITE Negative 12/14/2017    PROTEINUA Negative 12/14/2017    GLUCOSEU Negative 12/14/2017    KETONESU 15 (1+) (A) 12/14/2017    BILIRUBINUR Negative 12/14/2017    BLOODU Negative 12/14/2017     Lab Results   Component Value Date    ALBUMIN 3 0 (L) 08/05/2017     Lab Results   Component Value Date    INR 1 05 12/15/2017     No results found for: BLOODCX  No results found for: URINECX    Imaging Studies:   CT head showing SDH with mass effect; chronic lacunar infarcts    EKG, Pathology, and Other Studies:   EKG- QTc 443    Therapies:   PT: notes reviewed; recommending rehab at d/c  OT: notes reviewed; recommending rehab at d/c    VTE Prophylaxis: Sequential compression device (Venodyne)     Code Status: Level 1 - Full Code  Advance Directive and Living Will:      Power of :    POLST:

## 2017-12-18 NOTE — PROGRESS NOTES
Progress Note - Neurosurgery   Enrique Arellano 67 y o  female MRN: [id number]  Unit/Bed#: [facility] 931-01 Encounter: [id number]    Assessment:  1  POD#3 left fronto-temporal craniotomy for evacuation of subdural hematoma  2  Acute on chronic left subdural hematoma with midline shift  3  Dysarthria - improved  4  Gait dysfunction  5  Multiple falls  6  Hx of lacunar infarct     Plan:  · Exam: Baseline right eye lateral deviation  Speech improved, able to repeat  Able to perform simple math  FREEMAN with focal weakness in LUE triceps and RLE HF  · Imaging: personally reviewed and reviewed by attending:  · 12/16 - CT head wo: 1) left subdural drain has been removed without complications  Persistent loculated mixed density collection along the frontal parietal region without evidence for new hemorrhage  · 12/17 - CT head wo: 1) stable appearance of mixed density extra-axial hemorrhage with some acute and some chronic blood products in left hemisphere  Mass effect on left frontal parietal gyri noted with mild left to right midline shift measuring 6mm similar to prior  · STAT CT head without contrast if decline in GCS >2 pts/1 hr  · PT/OT: recommend rehab  · DVT ppx: SCDs  Recommend holding AC/AP due to high risk of re-accumulation of SDH  · Continue Keppra for a total of 7 days (day 4/7)  · Pain control: Ordered oxycodone 2 5mg and 10mg - recommend changing 10mg to 5mg  · Patient has not required any medications  · Medical management deferred to primary team, Trauma  · No additional neurosurgical intervention required at this time  S/O follow up outpatient in 2 weeks for incision check and in 6 weeks  Subjective/Objective   Chief Complaint: "I'm fine"    Subjective: Patient states she's doing well  Patient states "I'm trying to get people to understand what I'm saying" when asked if she thinks her speech has improved  She states she has some weakness in BUE but her left is worse than her right   She denies any headaches or dizziness, change in vision, chest pain, SOB, abdominal pain/N/V, urinary complications, or numbness  Objective: sitting up in the chair, drinking coffee  NAD  I/O       12/16 0701 - 12/17 0700 12/17 0701 - 12/18 0700 12/18 0701 - 12/19 0700    P  O  240 1080     I V  (mL/kg) 300 (5 3)      Blood       IV Piggyback 300 100     Total Intake(mL/kg) 840 (14 7) 1180 (19 3)     Urine (mL/kg/hr) 280 (0 2) 1045 (0 7)     Drains 90 (0 1)      Stool  0 (0)     Total Output 370 1045      Net +470 +135             Unmeasured Urine Occurrence 3 x 2 x     Unmeasured Stool Occurrence  1 x           Invasive Devices     Peripheral Intravenous Line            Peripheral IV 12/15/17 Left Forearm 3 days                Physical Exam:  Vitals: Blood pressure 96/51, pulse 80, temperature 98 2 °F (36 8 °C), temperature source Oral, resp  rate 16, height 5' 7" (1 702 m), weight 61 2 kg (134 lb 14 7 oz), SpO2 97 %, not currently breastfeeding  ,Body mass index is 21 13 kg/m²  General appearance: alert, appears stated age, cooperative and no distress  Head: Normocephalic, left frontotemporal incision is well healing, no erythema, edema or discharge  No tenderness on palpation  Eyes: Baseline right lateral eye deviation, left EOMI PERRL  Lungs: non labored breathing  Heart: regular heart rate  Neurologic:   Mental status: Alert, oriented x3, disoriented to president, thought content appropriate  Speech is improved, able to repeat  Able to perform simple math of 2+2 and 4*4  3/3 Immediate object recognition, 0/3 delayed object recall intact  Cranial nerves: baseline right eye lateral deviation and right facial droop at rest which corrects with expression  Tongue midline  Sensory: normal to LT in the face, BUE and BLE  Motor: moving all extremities with appreciated weakness in left triceps, 4/5 and right hip flexion 3/5, otherwise 5-/5 throughout BUE, 5/5 BLE     Reflexes: 1+ and symmetric  negative hartmann, negative clonus  Coordination: left finger to nose normal, right finger to nose slight past pointing, no drift bilaterally      Lab Results:    Results from last 7 days  Lab Units 12/18/17  0527 12/17/17  0547 12/16/17  0552  12/15/17  0601   WBC Thousand/uL 8 35 12 36* 11 13*  --  6 83   HEMOGLOBIN g/dL 11 0* 12 0 11 4*  < > 10 6*   HEMATOCRIT % 32 3* 34 5* 32 4*  < > 30 8*   PLATELETS Thousands/uL 357 341 336  --  284   NEUTROS PCT % 67  --   --   --  61   MONOS PCT % 8  --   --   --  9   < > = values in this interval not displayed  Results from last 7 days  Lab Units 12/18/17  0527 12/17/17  0547 12/16/17  0552   SODIUM mmol/L 135* 137 137   POTASSIUM mmol/L 3 7 3 6 4 0   CHLORIDE mmol/L 104 105 106   CO2 mmol/L 25 26 23   BUN mg/dL 13 11 6   CREATININE mg/dL 0 53* 0 46* 0 33*   CALCIUM mg/dL 8 2* 8 6 8 6   GLUCOSE RANDOM mg/dL 88 85 106       Results from last 7 days  Lab Units 12/18/17  0527 12/16/17  0552   MAGNESIUM mg/dL 2 0 2 1       Results from last 7 days  Lab Units 12/18/17  0527 12/16/17  0552   PHOSPHORUS mg/dL 2 8 2 8       Results from last 7 days  Lab Units 12/15/17  1501 12/15/17  0601 12/14/17  1954   INR  1 05 1 20* 1 01   PTT seconds 29  --  29     No results found for: TROPONINT  ABG:No results found for: PHART, OEW1YJZ, PO2ART, BYA1QVW, N5KJMRVM, BEART, SOURCE    Imaging Studies: I have personally reviewed pertinent reports  and I have personally reviewed pertinent films in PACS    EKG, Pathology, and Other Studies: I have personally reviewed pertinent reports        VTE  Prophylaxis: Sequential compression device (Venodyne)

## 2017-12-18 NOTE — PLAN OF CARE
Problem: OCCUPATIONAL THERAPY ADULT  Goal: Performs self-care activities at highest level of function for planned discharge setting  See evaluation for individualized goals  Treatment Interventions: ADL retraining, Functional transfer training, Endurance training, Cognitive reorientation, Patient/family training, Equipment evaluation/education, Compensatory technique education, Activityengagement          See flowsheet documentation for full assessment, interventions and recommendations  Limitation: Decreased ADL status, Decreased Safe judgement during ADL, Decreased cognition, Decreased endurance, Decreased self-care trans, Decreased high-level ADLs  Prognosis: Good, Fair  Assessment: Pt is a 67 y o  female who was admitted to [facility] on 12/14/2017 with Subdural hematoma (HCC) s/p fall   Pt's problem list also includes PMH of underlying neurological disorder, limited vision, previous surgery, limited cognition and cva, chronic sinusitis, GERD, hypotension, seizures  At baseline pt was completing adls independently and ambulating with rw  Pt lives with spouse in 1 story home  Currently pt requires moderate assist for overall ADLS and moderate assist for functional mobility/transfers  Pt currently presents with impairments in the following categories -steps to enter environment, difficulty performing ADLS, difficulty performing IADLS , limited insight into deficits, compliance, flat affect, decreased initiation and engagement  and health management  activity tolerance, endurance, standing balance/tolerance, memory, insight, safety , judgement  and attention   These impairments, as well as pt's risk for falls  limit pt's ability to safely engage in all baseline areas of occupation, includingbathing, dressing, toileting, functional mobility/transfers, community mobility, social participation  and leisure activities  From OT standpoint, recommend inpt rehab upon D/C   OT will continue to follow to address the below stated goals        OT Discharge Recommendation: Short Term Rehab

## 2017-12-18 NOTE — PROGRESS NOTES
Progress Note - [place] 1945, 67 y o  female MRN: [id number]    Unit/Bed#: [facility] 931-01 Encounter: [id number]    Primary Care Provider: Shady Lucas MD   Date and time admitted to hospital: 12/14/2017 10:37 PM        Delirium   Assessment & Plan    -appreciate Geriatrics recs  -on 1:1  -reorient        GERD (gastroesophageal reflux disease)   Assessment & Plan    -pepcid        * Subdural hematoma (HCC)   Assessment & Plan    -Keppra seizure ppx for 1week  -Q4 neuro checks  -hold SQH per neurosurgery  -drains are out  -CT 12/17 shows stable SDH and shift        hypothyroid  -on home levothyroxine  Speech: level 3 dysphagia diet  PT/OT  CM      Subjective/Objective   Chief Complaint: none    Subjective: Eating breakfast  1:1 overnight says patient was calm  No complaints  Feels weak in her feet, no numbness/tingling  Objective:     Meds/Allergies   Prescriptions Prior to Admission   Medication Sig Dispense Refill Last Dose    Bioflavonoid Products (ASTRID C PO) Take 500 mg by mouth   Unknown at Unknown time    calcium-vitamin D 250-100 MG-UNIT per tablet Take 1 tablet by mouth 2 (two) times a day   Unknown at Unknown time    Cyanocobalamin (B-12) 5000 MCG CAPS Take by mouth daily   Unknown at Unknown time    pseudoephedrine (SUDAFED) 30 mg tablet Take 60 mg by mouth every 4 (four) hours as needed for congestion   Unknown at Unknown time    Thyroid (LEVOTHYROXINE-LIOTHYRONINE) 30 MG TABS Take 30 mg by mouth 2 (two) times a day   Unknown at Unknown time       Vitals: Blood pressure 96/51, pulse 80, temperature 98 2 °F (36 8 °C), temperature source Oral, resp  rate 16, height 5' 7" (1 702 m), weight 61 2 kg (134 lb 14 7 oz), SpO2 97 %, not currently breastfeeding  Body mass index is 21 13 kg/m²   SpO2: SpO2: 97 %    ABG: No results found for: PHART, VCS0JCY, PO2ART, TQX0SYE, Q3RWYCKO, BEART, SOURCE      Intake/Output Summary (Last 24 hours) at 12/18/17 5496  Last data filed at 12/17/17 4416 Gross per 24 hour   Intake              840 ml   Output             1045 ml   Net             -205 ml       Invasive Devices     Peripheral Intravenous Line            Peripheral IV 12/15/17 Left Forearm 3 days                Nutrition/GI Proph/Bowel Reg: dysphagia diet    Physical Exam:   GENERAL APPEARANCE: NAD, awake, alert  HEENT: atraumatic, normocephalic, left sided facial droop, left eye pointed to left down with decreased EOM  CV: RRR no MRG  LUNGS: CTA b/l  ABD: soft, NT, ND  EXT: no c/c/e  NEURO: CN II-XII intact with the exception of above, 5/5 hand , full sensation  SKIN: warm dry    Lab Results: Results: I have personally reviewed pertinent reports  Imaging/EKG Studies: Results: I have personally reviewed pertinent reports      Other Studies: reviewed  VTE Prophylaxis: SCDs only

## 2017-12-19 PROBLEM — H54.7 VISUAL IMPAIRMENT: Status: ACTIVE | Noted: 2017-12-19

## 2017-12-19 PROBLEM — Z79.899 POLYPHARMACY: Status: ACTIVE | Noted: 2017-12-19

## 2017-12-19 PROBLEM — R13.10 DYSPHAGIA: Status: ACTIVE | Noted: 2017-12-19
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
## 2017-12-19 PROBLEM — R53.81 PHYSICAL DECONDITIONING: Status: ACTIVE | Noted: 2017-12-19

## 2017-12-19 PROBLEM — R26.2 AMBULATORY DYSFUNCTION: Status: ACTIVE | Noted: 2017-12-19

## 2017-12-19 PROBLEM — R41.89 COGNITIVE IMPAIRMENT: Status: ACTIVE | Noted: 2017-12-19

## 2017-12-19 LAB
ANION GAP SERPL CALCULATED.3IONS-SCNC: 6 MMOL/L (ref 4–13)
BASOPHILS # BLD AUTO: 0.04 THOUSANDS/ΜL (ref 0–0.1)
BASOPHILS NFR BLD AUTO: 1 % (ref 0–1)
BUN SERPL-MCNC: 14 MG/DL (ref 5–25)
CALCIUM SERPL-MCNC: 8.7 MG/DL (ref 8.3–10.1)
CHLORIDE SERPL-SCNC: 104 MMOL/L (ref 100–108)
CO2 SERPL-SCNC: 25 MMOL/L (ref 21–32)
CREAT SERPL-MCNC: 0.48 MG/DL (ref 0.6–1.3)
EOSINOPHIL # BLD AUTO: 0.22 THOUSAND/ΜL (ref 0–0.61)
EOSINOPHIL NFR BLD AUTO: 3 % (ref 0–6)
ERYTHROCYTE [DISTWIDTH] IN BLOOD BY AUTOMATED COUNT: 13.4 % (ref 11.6–15.1)
GFR SERPL CREATININE-BSD FRML MDRD: 98 ML/MIN/1.73SQ M
GLUCOSE SERPL-MCNC: 107 MG/DL (ref 65–140)
GLUCOSE SERPL-MCNC: 87 MG/DL (ref 65–140)
GLUCOSE SERPL-MCNC: 89 MG/DL (ref 65–140)
GLUCOSE SERPL-MCNC: 96 MG/DL (ref 65–140)
HCT VFR BLD AUTO: 34.6 % (ref 34.8–46.1)
HGB BLD-MCNC: 11.8 G/DL (ref 11.5–15.4)
LYMPHOCYTES # BLD AUTO: 1.72 THOUSANDS/ΜL (ref 0.6–4.47)
LYMPHOCYTES NFR BLD AUTO: 23 % (ref 14–44)
MCH RBC QN AUTO: 30.8 PG (ref 26.8–34.3)
MCHC RBC AUTO-ENTMCNC: 34.1 G/DL (ref 31.4–37.4)
MCV RBC AUTO: 90 FL (ref 82–98)
MONOCYTES # BLD AUTO: 0.72 THOUSAND/ΜL (ref 0.17–1.22)
MONOCYTES NFR BLD AUTO: 10 % (ref 4–12)
NEUTROPHILS # BLD AUTO: 4.64 THOUSANDS/ΜL (ref 1.85–7.62)
NEUTS SEG NFR BLD AUTO: 63 % (ref 43–75)
NRBC BLD AUTO-RTO: 0 /100 WBCS
PLATELET # BLD AUTO: 336 THOUSANDS/UL (ref 149–390)
PMV BLD AUTO: 8.9 FL (ref 8.9–12.7)
POTASSIUM SERPL-SCNC: 4.2 MMOL/L (ref 3.5–5.3)
RBC # BLD AUTO: 3.83 MILLION/UL (ref 3.81–5.12)
SODIUM SERPL-SCNC: 135 MMOL/L (ref 136–145)
WBC # BLD AUTO: 7.36 THOUSAND/UL (ref 4.31–10.16)

## 2017-12-19 PROCEDURE — 80048 BASIC METABOLIC PNL TOTAL CA: CPT | Performed by: STUDENT IN AN ORGANIZED HEALTH CARE EDUCATION/TRAINING PROGRAM

## 2017-12-19 PROCEDURE — 97110 THERAPEUTIC EXERCISES: CPT

## 2017-12-19 PROCEDURE — 82948 REAGENT STRIP/BLOOD GLUCOSE: CPT

## 2017-12-19 PROCEDURE — 97535 SELF CARE MNGMENT TRAINING: CPT

## 2017-12-19 PROCEDURE — 97116 GAIT TRAINING THERAPY: CPT

## 2017-12-19 PROCEDURE — 92526 ORAL FUNCTION THERAPY: CPT

## 2017-12-19 PROCEDURE — 85025 COMPLETE CBC W/AUTO DIFF WBC: CPT | Performed by: STUDENT IN AN ORGANIZED HEALTH CARE EDUCATION/TRAINING PROGRAM

## 2017-12-19 RX ORDER — OXYCODONE HYDROCHLORIDE 5 MG/1
2.5 TABLET ORAL EVERY 6 HOURS PRN
Status: DISCONTINUED | OUTPATIENT
Start: 2017-12-19 | End: 2017-12-20

## 2017-12-19 RX ADMIN — LEVETIRACETAM 500 MG: 500 TABLET ORAL at 08:44

## 2017-12-19 RX ADMIN — LEVOTHYROXINE SODIUM 25 MCG: 25 TABLET ORAL at 06:17

## 2017-12-19 RX ADMIN — DOCUSATE SODIUM 100 MG: 100 CAPSULE, LIQUID FILLED ORAL at 08:44

## 2017-12-19 RX ADMIN — VITAMIN D, TAB 1000IU (100/BT) 1000 UNITS: 25 TAB at 08:44

## 2017-12-19 RX ADMIN — ACETAMINOPHEN 650 MG: 325 TABLET, FILM COATED ORAL at 06:17

## 2017-12-19 RX ADMIN — LEVETIRACETAM 500 MG: 500 TABLET ORAL at 21:25

## 2017-12-19 RX ADMIN — ACETAMINOPHEN 650 MG: 325 TABLET, FILM COATED ORAL at 21:25

## 2017-12-19 RX ADMIN — DOCUSATE SODIUM 100 MG: 100 CAPSULE, LIQUID FILLED ORAL at 17:34

## 2017-12-19 RX ADMIN — NICOTINE 1 PATCH: 14 PATCH, EXTENDED RELEASE TRANSDERMAL at 08:47

## 2017-12-19 NOTE — PROGRESS NOTES
Progress Note - Elma Burgos 67 y o  female MRN: [id number]    Unit/Bed#: [facility] 931-01 Encounter: [id number]      Assessment/Plan  1  Delirium  Multifactorial  Patient's cell respond, but is alert and oriented to person, place, month, year  Redirect unwanted patient behaviors as first line tx  Reorient patient frequently  Avoid deliriogenic meds including tramadol, benzodiazepines, benadryl  Good sleep hygiene important, limit night time interruptions  Encourage patient to stay awake during the day  Ensure adequate hydration/nutrition  Mobilize often   If patient becomes agitated/combative and cannot be redirected recommend zyprexa 2 5mg IM PRN Q8  Has not required any antipsychotics in the last 24 hours    2  Cognitive impairment  History of CVA, acute on chronic SDH  Patient mini cog performed in August  Patient currently alert and oriented to person, place, month, year  Patient needs follow-up as outpatient, [facility]    3  Ambulatory dysfunction  PT, OT  Continue with vitamin D3 supplement  Patient will need rehab upon discharge    4  Fall  Fall precautions  No suspicious home meds  PT, OT  Rehab recommended, case management reaching out to facilities    5  Deconditioning  Mobilize patient frequently to prevent further decline  PT, OT  Rehab    6  Visual impairment  Ensure patient wearing glasses at all appropriate times  Keep room well lit when appropriate    7  Polypharmacy  Famotidine, Zofran discontinued, agree    8  Acute on chronic SDH  Nsx following and managing     9  Dysphagia  On modified diet     10  Acute pain  Tylenol 650mg Q8   Oxycodone 2 5mg prn Q4        Subjective:   Patient at baseline  Sleeping, wakes up, takes a while to respond, but is alert and oriented x4  PROS cannot be gathered secondary to dysarthria May and underlying delirium, cognitive impairment      Objective:     Vitals: Blood pressure 132/64, pulse 78, temperature 98 1 °F (36 7 °C), temperature source Oral, resp  rate 15, height 5' 7" (1 702 m), weight 63 kg (138 lb 14 4 oz), SpO2 98 %, not currently breastfeeding  ,Body mass index is 21 75 kg/m²  Intake/Output Summary (Last 24 hours) at 12/19/17 1158  Last data filed at 12/19/17 0933   Gross per 24 hour   Intake              620 ml   Output                0 ml   Net              620 ml       Physical Exam: General : WD in NAD  HEENT : MMM no erythema or exudates  EOMI, sclera anicteric  Heart : Normal rate  Lungs : CTA  Abdomen : Soft, NT/ND, BS auscultated in all 4 quads  No organomegaly  Ext :  Pulses +2/4 B/L  Neg edema B/L  Neg calf swelling B/L  Skin : Pink, warm, dry, age appropriate turgor and mobility  Neuro : Nonfocal  Psych : A * O x 4, slow to respond, underlying cognitive impairment, delirium earlier in admission       Invasive Devices     Peripheral Intravenous Line            Peripheral IV 12/19/17 Right;Dorsal (posterior) Forearm less than 1 day                Lab, Imaging and other studies: I have personally reviewed pertinent reports      VTE Pharmacologic Prophylaxis: Sequential compression device (Venodyne)   VTE Mechanical Prophylaxis: sequential compression device

## 2017-12-19 NOTE — PLAN OF CARE
Problem: PHYSICAL THERAPY ADULT  Goal: Performs mobility at highest level of function for planned discharge setting  See evaluation for individualized goals  Treatment/Interventions: Functional transfer training, LE strengthening/ROM, Elevations, Therapeutic exercise, Endurance training, Patient/family training, Equipment eval/education, Bed mobility, Gait training, Spoke to MD, Spoke to nursing, Spoke to case management, OT  Equipment Recommended: Carmina Velasquez (? need for AFO)       See flowsheet documentation for full assessment, interventions and recommendations  Outcome: Progressing  Prognosis: Fair  Problem List: Decreased strength, Decreased endurance, Impaired balance, Decreased mobility, Decreased coordination, Decreased cognition, Decreased safety awareness, Decreased skin integrity  Assessment: Pt able to ambulate 40 feet x2 with use of RW on tile and hardwood jesus modAx1  Constant max verbal and physical instruction during upright mobility with BLE gait sequence and manuvering of RW  Pt needs modAx1 for sit<->stand transfers, constant max verbal and physical instruction with proper B hand placement and BLE against recliner prior to pt trying premature stand->sit transfer  Pt able to perform and complete BLE ther ex prior to mobility AROM with constant max verbal/physical instruction  Pt would cont to benefit from skilled inpt PT services to maximize functional independence  Barriers to Discharge: Inaccessible home environment, Decreased caregiver support     Recommendation: Post acute IP rehab     PT - OK to Discharge: Yes (when medically appropriate to rehab )    See flowsheet documentation for full assessment

## 2017-12-19 NOTE — PROGRESS NOTES
Spoke wit Jeff Fan with trauma, plan for today is look for rehab facilities, continue to monitor pt, d/c stepdown order

## 2017-12-19 NOTE — PLAN OF CARE
Problem: Potential for Falls  Goal: Patient will remain free of falls  INTERVENTIONS:  - Assess patient frequently for physical needs  -  Identify cognitive and physical deficits and behaviors that affect risk of falls  -  [place] fall precautions as indicated by assessment   - Educate patient/family on patient safety including physical limitations  - Instruct patient to call for assistance with activity based on assessment  - Modify environment to reduce risk of injury  - Consider OT/PT consult to assist with strengthening/mobility   Outcome: Progressing      Problem: Prexisting or High Potential for Compromised Skin Integrity  Goal: Skin integrity is maintained or improved  INTERVENTIONS:  - Identify patients at risk for skin breakdown  - Assess and monitor skin integrity  - Assess and monitor nutrition and hydration status  - Monitor labs (i e  albumin)  - Assess for incontinence   - Turn and reposition patient  - Assist with mobility/ambulation  - Relieve pressure over bony prominences  - Avoid friction and shearing  - Provide appropriate hygiene as needed including keeping skin clean and dry  - Evaluate need for skin moisturizer/barrier cream  - Collaborate with interdisciplinary team (i e  Nutrition, Rehabilitation, etc )   - Patient/family teaching   Outcome: Progressing      Problem: Nutrition/Hydration-ADULT  Goal: Nutrient/Hydration intake appropriate for improving, restoring or maintaining nutritional needs  Monitor and assess patient's nutrition/hydration status for malnutrition (ex- brittle hair, bruises, dry skin, pale skin and conjunctiva, muscle wasting, smooth red tongue, and disorientation)  Collaborate with interdisciplinary team and initiate plan and interventions as ordered  Monitor patient's weight and dietary intake as ordered or per policy  Utilize nutrition screening tool and intervene per policy   Determine patient's food preferences and provide high-protein, high-caloric foods as appropriate  INTERVENTIONS:  - Monitor oral intake, urinary output, labs, and treatment plans  - Assess nutrition and hydration status and recommend course of action  - Evaluate amount of meals eaten  - Assist patient with eating if necessary   - Allow adequate time for meals  - Recommend/ encourage appropriate diets, oral nutritional supplements, and vitamin/mineral supplements  - Order, calculate, and assess calorie counts as needed  - Recommend, monitor, and adjust tube feedings and TPN/PPN based on assessed needs  - Assess need for intravenous fluids  - Provide specific nutrition/hydration education as appropriate  - Include patient/family/caregiver in decisions related to nutrition   Outcome: Progressing      Problem: DISCHARGE PLANNING - CARE MANAGEMENT  Goal: Discharge to post-acute care or home with appropriate resources  INTERVENTIONS:  - Conduct assessment to determine patient/family and health care team treatment goals, and need for post-acute services based on payer coverage, community resources, and patient preferences, and barriers to discharge  - Address psychosocial, clinical, and financial barriers to discharge as identified in assessment in conjunction with the patient/family and health care team  - Arrange appropriate level of post-acute services according to patient's   needs and preference and payer coverage in collaboration with the physician and health care team  - Communicate with and update the patient/family, physician, and health care team regarding progress on the discharge plan  - Arrange appropriate transportation to post-acute venues   - Pt to d/c with appropriate resources when medically stable     Outcome: Progressing      Problem: PAIN - ADULT  Goal: Verbalizes/displays adequate comfort level or baseline comfort level  Interventions:  - Encourage patient to monitor pain and request assistance  - Assess pain using appropriate pain scale  - Administer analgesics based on type and severity of pain and evaluate response  - Implement non-pharmacological measures as appropriate and evaluate response  - Consider cultural and social influences on pain and pain management  - Notify physician/advanced practitioner if interventions unsuccessful or patient reports new pain   Outcome: Progressing      Problem: INFECTION - ADULT  Goal: Absence or prevention of progression during hospitalization  INTERVENTIONS:  - Assess and monitor for signs and symptoms of infection  - Monitor lab/diagnostic results  - Monitor all insertion sites, i e  indwelling lines, tubes, and drains  - Monitor endotracheal (as able) and nasal secretions for changes in amount and color  - [place] appropriate cooling/warming therapies per order  - Administer medications as ordered  - Instruct and encourage patient and family to use good hand hygiene technique  - Identify and instruct in appropriate isolation precautions for identified infection/condition   Outcome: Progressing    Goal: Absence of fever/infection during neutropenic period  INTERVENTIONS:  - Monitor WBC  - Implement neutropenic guidelines   Outcome: Progressing      Problem: SAFETY ADULT  Goal: Maintain or return to baseline ADL function  INTERVENTIONS:  -  Assess patient's ability to carry out ADLs; assess patient's baseline for ADL function and identify physical deficits which impact ability to perform ADLs (bathing, care of mouth/teeth, toileting, grooming, dressing, etc )  - Assess/evaluate cause of self-care deficits   - Assess range of motion  - Assess patient's mobility; develop plan if impaired  - Assess patient's need for assistive devices and provide as appropriate  - Encourage maximum independence but intervene and supervise when necessary  ¯ Involve family in performance of ADLs  ¯ Assess for home care needs following discharge   ¯ Request OT consult to assist with ADL evaluation and planning for discharge  ¯ Provide patient education as appropriate Outcome: Progressing    Goal: Maintain or return mobility status to optimal level  INTERVENTIONS:  - Assess patient's baseline mobility status (ambulation, transfers, stairs, etc )    - Identify cognitive and physical deficits and behaviors that affect mobility  - Identify mobility aids required to assist with transfers and/or ambulation (gait belt, sit-to-stand, lift, walker, cane, etc )  - [place] fall precautions as indicated by assessment  - Record patient progress and toleration of activity level on Mobility SBAR; progress patient to next Phase/Stage  - Instruct patient to call for assistance with activity based on assessment  - Request Rehabilitation consult to assist with strengthening/weightbearing, etc    Outcome: Progressing      Problem: DISCHARGE PLANNING  Goal: Discharge to home or other facility with appropriate resources  INTERVENTIONS:  - Identify barriers to discharge w/patient and caregiver  - Arrange for needed discharge resources and transportation as appropriate  - Identify discharge learning needs (meds, wound care, etc )  - Arrange for interpretive services to assist at discharge as needed  - Refer to Case Management Department for coordinating discharge planning if the patient needs post-hospital services based on physician/advanced practitioner order or complex needs related to functional status, cognitive ability, or social support system   Outcome: Progressing      Problem: Knowledge Deficit  Goal: Patient/family/caregiver demonstrates understanding of disease process, treatment plan, medications, and discharge instructions  Complete learning assessment and assess knowledge base    Interventions:  - Provide teaching at level of understanding  - Provide teaching via preferred learning methods   Outcome: Progressing

## 2017-12-19 NOTE — CASE MANAGEMENT
Continued Stay Review    Date: 12/19/2017    Vital Signs: /57   Pulse 94   Temp 98 3 °F (36 8 °C) (Oral)   Resp 16   Ht 5' 7" (1 702 m)   Wt 63 kg (138 lb 14 4 oz)   LMP  (LMP Unknown)   SpO2 98%   BMI 21 75 kg/m²     Medications:   Scheduled Meds:   acetaminophen 650 mg Oral Q8H [address]   cholecalciferol 1,000 Units Oral Daily   docusate sodium 100 mg Oral BID   levETIRAcetam 500 mg Oral Q12H DEVONTE   levothyroxine 25 mcg Oral Early Morning   nicotine 1 patch Transdermal Daily     Continuous Infusions:    PRN Meds: bisacodyl    oxyCODONE    oxyCODONE    Abnormal Labs/Diagnostic Results: na 135--cr 1 48--hct 34 6  Age/Sex: 67 y o  female     Assessment/Plan: Assessment/Plan  1  Delirium  Multifactorial  Patient's cell respond, but is alert and oriented to person, place, month, year  Redirect unwanted patient behaviors as first line tx  Reorient patient frequently  Avoid deliriogenic meds including tramadol, benzodiazepines, benadryl  Good sleep hygiene important, limit night time interruptions  Encourage patient to stay awake during the day  Ensure adequate hydration/nutrition  Mobilize often   If patient becomes agitated/combative and cannot be redirected recommend zyprexa 2 5mg IM PRN Q8  Has not required any antipsychotics in the last 24 hours     2  Cognitive impairment  History of CVA, acute on chronic SDH  Patient mini cog performed in August  Patient currently alert and oriented to person, place, month, year  Patient needs follow-up as outpatient, [facility]     3  Ambulatory dysfunction  PT, OT  Continue with vitamin D3 supplement  Patient will need rehab upon discharge     4  Fall  Fall precautions  No suspicious home meds  PT, OT  Rehab recommended, case management reaching out to facilities     5  Deconditioning  Mobilize patient frequently to prevent further decline  PT, OT  Rehab     6    Visual impairment  Ensure patient wearing glasses at all appropriate times  Keep room well lit when appropriate     7  Polypharmacy  Famotidine, Zofran discontinued, agree     8  Acute on chronic SDH  Nsx following and managing      9  Dysphagia  On modified diet      10   Acute pain  Tylenol 650mg Q8   Oxycodone 2 5mg prn Q4    Discharge Plan: rehab

## 2017-12-19 NOTE — PLAN OF CARE
Problem: OCCUPATIONAL THERAPY ADULT  Goal: Performs self-care activities at highest level of function for planned discharge setting  See evaluation for individualized goals  Treatment Interventions: ADL retraining, Functional transfer training, Endurance training, Cognitive reorientation, Patient/family training, Equipment evaluation/education, Compensatory technique education, Activityengagement          See flowsheet documentation for full assessment, interventions and recommendations  Outcome: Progressing  Limitation: Decreased ADL status, Decreased Safe judgement during ADL, Decreased cognition, Decreased endurance, Decreased self-care trans, Decreased high-level ADLs  Prognosis: Good, Fair  Assessment: pt participated in am ot session focusing on toileting, grooming, dressing of lb and fucntional mobility / transfers  pt was oreinted to month but not day of week / date  pt requires increased time to complete all tasks  pt required max asst to place hair into pony tail and bun  pt tolerated session well, requireing min asst to steer and maange rw and to balance self while toileting and grooming at sink  pt was motivated for therapy this session  pt requesting paper and pencil for drawing while oob  pt was also provided with adult coloring  pt then stated glassess are misplaced        OT Discharge Recommendation: Short Term Rehab    April [place], [place]

## 2017-12-19 NOTE — SOCIAL WORK
Adriana Mayen Obtained  #[id number]  Next review date is 12/26/17  Follow up with Franklyn Akins  [phone]  [id number]  [phone] fax    Pt can d/c tomorrow to [id number] via [facility]

## 2017-12-19 NOTE — OCCUPATIONAL THERAPY NOTE
Occupational Therapy Treatment Note:       12/19/17 0945   Restrictions/Precautions   Other Precautions Chair Alarm   Pain Assessment   Pain Assessment No/denies pain   Pain Score No Pain   ADL   Grooming Assistance 4  Minimal Assistance  (min asst for balance washing hands sinkside)   Grooming Comments max asst to manage pony tail and bun, hair combing, min asst for grooming and face washing for thoroughness  poor awareness of dirty mouth after breakfast    UB Dressing Assistance 2  Maximal Assistance  (robe donning in stance, mod asst to pedro forward hosp gown)   Toileting Assistance  3  Moderate Assistance   Toileting Deficit Steadying;Clothing management down;Clothing management up   Toileting Comments min asst steading mod asst clothing and archie pad management and for thoroughness for toileting   Functional Standing Tolerance   Time pt stood for approx 10 min for grooming and toileting needing minimal balance support at times  Transfers   Sit to Stand 4  Minimal assistance   Additional items Assist x 1   Stand to Sit 4  Minimal assistance   Additional items Assist x 1   Stand pivot 4  Minimal assistance   Additional items Assist x 1   Toilet transfer 4  Minimal assistance   Additional items Assist x 1   Functional Mobility   Functional Mobility 4  Minimal assistance   Additional items Rolling walker   Coordination   Fine Motor minimal difficulty noted with fastners on robe, no droppage noted but movements are slowed  Cognition   Overall Cognitive Status Impaired   Activity Tolerance   Activity Tolerance Patient tolerated treatment well   Assessment   Assessment pt participated in am ot session focusing on toileting, grooming, dressing of lb and fucntional mobility / transfers  pt was oreinted to month but not day of week / date  pt requires increased time to complete all tasks  pt required max asst to place hair into pony tail and bun    pt tolerated session well, requireing min asst to steer and maange rw and to balance self while toileting and grooming at sink  pt was motivated for therapy this session  pt requesting paper and pencil for drawing while oob  pt was also provided with adult coloring  pt then stated glassess are misplaced  Plan   Treatment Interventions ADL retraining;Functional transfer training;Cognitive reorientation; Endurance training;UE strengthening/ROM; Patient/family training;Fine motor coordination activities; Activityengagement   Goal Expiration Date 01/01/18   OT Frequency 3-5x/wk   Recommendation   OT Discharge Recommendation Short Term Rehab   Barthel Index   Feeding 5   Bathing 0   Grooming Score 0   Dressing Score 5   Bladder Score 10   Bowels Score 10   Toilet Use Score 5   Transfers (Bed/Chair) Score 10   Mobility (Level Surface) Score 0   Stairs Score 0   Barthel Index Score 45   Modified Stokes Scale   Modified Stokes Scale 4   April A REBECCA Merritt

## 2017-12-19 NOTE — PHYSICAL THERAPY NOTE
Physical Therapy Tx Session:       12/19/17 1050   Pain Assessment   Pain Assessment FLACC   Pain Rating: FLACC (Rest) - Face 0   Pain Rating: FLACC (Rest) - Legs 0   Pain Rating: FLACC (Rest) - Activity 0   Pain Rating: FLACC (Rest) - Cry 0   Pain Rating: FLACC (Rest) - Consolability 0   Score: FLACC (Rest) 0   Pain Rating: FLACC (Activity) - Face 0   Pain Rating: FLACC (Activity) - Legs 0   Pain Rating: FLACC (Activity) - Activity 0   Pain Rating: FLACC (Activity) - Cry 0   Pain Rating: FLACC (Activity) - Consolability 0   Score: FLACC (Activity) 0   Restrictions/Precautions   Other Precautions Fall Risk;Multiple lines; Chair Alarm   General   Chart Reviewed Yes   Family/Caregiver Present No   Cognition   Overall Cognitive Status Impaired   Arousal/Participation Arousable   Attention Difficulty attending to directions  (pt needs constant max verbal/physical instruction)   Orientation Level Oriented to person  (looks at therapist once first name is called)   Following Commands Follows one step commands with increased time or repetition  (2* slow mobility,slow to respond to instruction,flat affect)   Subjective   Subjective pt sitting upright in chair resting comfortably;pt willing and agreeable to work with PT and to participate in therapy intervention;pt difficult to verbally understand,garbled speech,drooling and slow to respond to questions and therapist responses;"Let's take it slow"   Bed Mobility   Supine to Sit Unable to assess  (pt sitting in chair pre and post mobility)   Transfers   Sit to Stand 3  Moderate assistance   Additional items Assist x 1; Armrests; Increased time required;Verbal cues   Stand to Sit 3  Moderate assistance   Additional items Assist x 1; Armrests; Increased time required;Verbal cues  (for safety,education and control descent;premature transfers)   Additional Comments premature stand->sit transfer to recliner chair with pt needing max verbal and physical instruction for BLE backward steps and reach of BUE to armrest of chair   Ambulation/Elevation   Gait pattern Excessively slow; Step to;Ataxia; Short stride; Foward flexed; Inconsistent roxi; Shuffling;Decreased foot clearance; Forward Flexion;Narrow LUIS ANTONIO  (dec ability to weight shift BLE in upright mobility)   Gait Assistance 3  Moderate assist   Additional items Assist x 1;Verbal cues; Tactile cues   Assistive Device Rolling walker   Distance 40 feetx2 with use of RW on tile and hardwood jesus;pt needed constant max verbal and physical instruction throughout upright mobility with BLE gait sequence and manuvering of RW   Balance   Static Sitting Good  (chair alarm intact prior to leaving pt;s room)   Dynamic Sitting Poor   Static Standing Poor   Dynamic Standing Poor   Ambulatory Poor   Endurance Deficit   Endurance Deficit Yes   Endurance Deficit Description deconditioning,fatigue   Activity Tolerance   Activity Tolerance Patient limited by fatigue   Nurse Made Aware yes   Exercises   Hip Flexion Sitting;15 reps;AROM; Bilateral  (constant max instruction)   Hip Abduction Sitting;15 reps;AROM; Bilateral  (constant max instruction verbal and physical)   Hip Adduction Sitting;15 reps;AROM; Bilateral  (constant max instruction verbal and physical)   Knee AROM Long Arc Quad Sitting;15 reps;AROM; Bilateral  (constant max instruction verbal and physical)   Ankle Pumps Sitting;20 reps;AROM; Bilateral  (constant max instruction verbal and physical)   Assessment   Prognosis Fair   Problem List Decreased strength;Decreased endurance; Impaired balance;Decreased mobility; Decreased coordination;Decreased cognition;Decreased safety awareness;Decreased skin integrity   Assessment Pt able to ambulate 40 feet x2 with use of RW on tile and hardwood jesus modAx1  Constant max verbal and physical instruction during upright mobility with BLE gait sequence and manuvering of RW   Pt needs modAx1 for sit<->stand transfers, constant max verbal and physical instruction with proper B hand placement and BLE against recliner prior to pt trying premature stand->sit transfer  Pt able to perform and complete BLE ther ex prior to mobility AROM with constant max verbal/physical instruction  Pt would cont to benefit from skilled inpt PT services to maximize functional independence   Goals   Patient Goals to try   LTG Expiration Date 01/01/18   Treatment Day 1   Plan   Treatment/Interventions Functional transfer training;LE strengthening/ROM; Therapeutic exercise; Endurance training;Patient/family training;Equipment eval/education;Gait training;Spoke to nursing   Progress Slow progress, decreased activity tolerance   PT Frequency Weekend;5x/wk   Recommendation   Recommendation Post acute IP rehab   Equipment Recommended Walker   Skilled PT recommended while in hospital and upon DC to progress pt toward treatment goals

## 2017-12-19 NOTE — SPEECH THERAPY NOTE
Speech Language/Pathology                             SLP  Note  Patient Name: Brooke Colmenares  TYWEF'V Date: 12/19/2017     Problem List  Patient Active Problem List   Diagnosis    [facility]    Weakness of extremity    Hyponatremia    SAH [facility]arachnoid hemorrhage) [facility] [facility]    SAH [facility]arachnoid hemorrhage) [facility] [address] [facility]    Lacunar infarct, acute (HCC)    Left-sided weakness    Dysarthria    Debility    Subdural hematoma (HCC)    GERD (gastroesophageal reflux disease)    Delirium    Cognitive impairment    Ambulatory dysfunction    Fall    Physical deconditioning    Visual impairment    Polypharmacy    Dysphagia     Past Medical History  Past Medical History:   Diagnosis Date    Chronic sinusitis     GERD (gastroesophageal reflux disease)     Hypotension     Seizures ([facility] [address] )      Past Surgical History  Past Surgical History:   Procedure Laterality Date    ABDOMINAL SURGERY      CLOSED REDUCTION DISTAL FEMUR FRACTURE      CRANIOTOMY      CRANIOTOMY Left 12/15/2017    Procedure: Left fronto-temporal craniotomy for evacuation of subdural hematoma and membrane stripping;  Surgeon: Marcell High MD;  [facility];  Service: Neurosurgery    HYSTERECTOMY      HYSTERECTOMY      LUNG LOBECTOMY Right     OOPHORECTOMY      SPLENECTOMY           Subjective:  "They think everyone likes chocolate" Pt is oob self feeding lunch  Current diet: level 3 w/ thin  Objective:  Pt seen for ongoing dx dysphagia tx  Pt looks R, improved eye contact when slp in front of her  Slow but adequate mastication of the solids on the tray  Min lingual retention periodically as well as mild anterior loss on the R   Pt able to take small sips of thin w/ prompt swallow, 2 swallows for all  No wet vocal quality noted  Pt w/ significant dysarthria that continues- cued to increase volume, pt intelligible when cued  Assessment:  Current diet is appropriate for pt    No need for change at this time     Plan/Recommendations:  Continue level 3 w/ thin  Will address speech

## 2017-12-20 VITALS
RESPIRATION RATE: 18 BRPM | HEART RATE: 82 BPM | WEIGHT: 138.9 LBS | BODY MASS INDEX: 21.8 KG/M2 | DIASTOLIC BLOOD PRESSURE: 65 MMHG | TEMPERATURE: 98 F | HEIGHT: 67 IN | SYSTOLIC BLOOD PRESSURE: 128 MMHG | OXYGEN SATURATION: 97 %

## 2017-12-20 PROBLEM — Z79.899 POLYPHARMACY: Status: RESOLVED | Noted: 2017-12-19 | Resolved: 2017-12-20

## 2017-12-20 LAB — GLUCOSE SERPL-MCNC: 84 MG/DL (ref 65–140)

## 2017-12-20 PROCEDURE — 82948 REAGENT STRIP/BLOOD GLUCOSE: CPT

## 2017-12-20 RX ORDER — LEVOTHYROXINE SODIUM 0.03 MG/1
25 TABLET ORAL
Qty: 30 TABLET | Refills: 0 | Status: SHIPPED | OUTPATIENT
Start: 2017-12-21

## 2017-12-20 RX ORDER — NICOTINE 21 MG/24HR
1 PATCH, TRANSDERMAL 24 HOURS TRANSDERMAL DAILY
Qty: 28 PATCH | Refills: 0 | Status: SHIPPED | OUTPATIENT
Start: 2017-12-20 | End: 2018-02-08

## 2017-12-20 RX ORDER — ACETAMINOPHEN 325 MG/1
TABLET ORAL
Qty: 30 TABLET | Refills: 0 | Status: SHIPPED | OUTPATIENT
Start: 2017-12-20

## 2017-12-20 RX ORDER — DOCUSATE SODIUM 100 MG/1
100 CAPSULE, LIQUID FILLED ORAL 2 TIMES DAILY
Qty: 10 CAPSULE | Refills: 0 | Status: SHIPPED | OUTPATIENT
Start: 2017-12-20

## 2017-12-20 RX ORDER — LEVETIRACETAM 500 MG/1
500 TABLET ORAL EVERY 12 HOURS SCHEDULED
Qty: 8 TABLET | Refills: 0 | Status: SHIPPED | OUTPATIENT
Start: 2017-12-20 | End: 2018-01-31 | Stop reason: HOSPADM

## 2017-12-20 RX ORDER — BISACODYL 10 MG
10 SUPPOSITORY, RECTAL RECTAL DAILY PRN
Qty: 12 SUPPOSITORY | Refills: 0 | Status: SHIPPED | OUTPATIENT
Start: 2017-12-20

## 2017-12-20 RX ADMIN — VITAMIN D, TAB 1000IU (100/BT) 1000 UNITS: 25 TAB at 08:59

## 2017-12-20 RX ADMIN — LEVOTHYROXINE SODIUM 25 MCG: 25 TABLET ORAL at 05:33

## 2017-12-20 RX ADMIN — DOCUSATE SODIUM 100 MG: 100 CAPSULE, LIQUID FILLED ORAL at 08:59

## 2017-12-20 RX ADMIN — ACETAMINOPHEN 650 MG: 325 TABLET, FILM COATED ORAL at 05:33

## 2017-12-20 RX ADMIN — LEVETIRACETAM 500 MG: 500 TABLET ORAL at 09:00

## 2017-12-20 RX ADMIN — NICOTINE 1 PATCH: 14 PATCH, EXTENDED RELEASE TRANSDERMAL at 09:00

## 2017-12-20 NOTE — PLAN OF CARE
Problem: Potential for Falls  Goal: Patient will remain free of falls  INTERVENTIONS:  - Assess patient frequently for physical needs  -  Identify cognitive and physical deficits and behaviors that affect risk of falls  -  [place] fall precautions as indicated by assessment   - Educate patient/family on patient safety including physical limitations  - Instruct patient to call for assistance with activity based on assessment  - Modify environment to reduce risk of injury  - Consider OT/PT consult to assist with strengthening/mobility   Outcome: Progressing      Problem: Prexisting or High Potential for Compromised Skin Integrity  Goal: Skin integrity is maintained or improved  INTERVENTIONS:  - Identify patients at risk for skin breakdown  - Assess and monitor skin integrity  - Assess and monitor nutrition and hydration status  - Monitor labs (i e  albumin)  - Assess for incontinence   - Turn and reposition patient  - Assist with mobility/ambulation  - Relieve pressure over bony prominences  - Avoid friction and shearing  - Provide appropriate hygiene as needed including keeping skin clean and dry  - Evaluate need for skin moisturizer/barrier cream  - Collaborate with interdisciplinary team (i e  Nutrition, Rehabilitation, etc )   - Patient/family teaching   Outcome: Progressing      Problem: Nutrition/Hydration-ADULT  Goal: Nutrient/Hydration intake appropriate for improving, restoring or maintaining nutritional needs  Monitor and assess patient's nutrition/hydration status for malnutrition (ex- brittle hair, bruises, dry skin, pale skin and conjunctiva, muscle wasting, smooth red tongue, and disorientation)  Collaborate with interdisciplinary team and initiate plan and interventions as ordered  Monitor patient's weight and dietary intake as ordered or per policy  Utilize nutrition screening tool and intervene per policy   Determine patient's food preferences and provide high-protein, high-caloric foods as appropriate  INTERVENTIONS:  - Monitor oral intake, urinary output, labs, and treatment plans  - Assess nutrition and hydration status and recommend course of action  - Evaluate amount of meals eaten  - Assist patient with eating if necessary   - Allow adequate time for meals  - Recommend/ encourage appropriate diets, oral nutritional supplements, and vitamin/mineral supplements  - Order, calculate, and assess calorie counts as needed  - Recommend, monitor, and adjust tube feedings and TPN/PPN based on assessed needs  - Assess need for intravenous fluids  - Provide specific nutrition/hydration education as appropriate  - Include patient/family/caregiver in decisions related to nutrition   Outcome: Progressing      Problem: DISCHARGE PLANNING - CARE MANAGEMENT  Goal: Discharge to post-acute care or home with appropriate resources  INTERVENTIONS:  - Conduct assessment to determine patient/family and health care team treatment goals, and need for post-acute services based on payer coverage, community resources, and patient preferences, and barriers to discharge  - Address psychosocial, clinical, and financial barriers to discharge as identified in assessment in conjunction with the patient/family and health care team  - Arrange appropriate level of post-acute services according to patient's   needs and preference and payer coverage in collaboration with the physician and health care team  - Communicate with and update the patient/family, physician, and health care team regarding progress on the discharge plan  - Arrange appropriate transportation to post-acute venues   - Pt to d/c with appropriate resources when medically stable     Outcome: Progressing      Problem: PAIN - ADULT  Goal: Verbalizes/displays adequate comfort level or baseline comfort level  Interventions:  - Encourage patient to monitor pain and request assistance  - Assess pain using appropriate pain scale  - Administer analgesics based on type and severity of pain and evaluate response  - Implement non-pharmacological measures as appropriate and evaluate response  - Consider cultural and social influences on pain and pain management  - Notify physician/advanced practitioner if interventions unsuccessful or patient reports new pain   Outcome: Progressing      Problem: INFECTION - ADULT  Goal: Absence or prevention of progression during hospitalization  INTERVENTIONS:  - Assess and monitor for signs and symptoms of infection  - Monitor lab/diagnostic results  - Monitor all insertion sites, i e  indwelling lines, tubes, and drains  - Monitor endotracheal (as able) and nasal secretions for changes in amount and color  - [place] appropriate cooling/warming therapies per order  - Administer medications as ordered  - Instruct and encourage patient and family to use good hand hygiene technique  - Identify and instruct in appropriate isolation precautions for identified infection/condition   Outcome: Progressing    Goal: Absence of fever/infection during neutropenic period  INTERVENTIONS:  - Monitor WBC  - Implement neutropenic guidelines   Outcome: Progressing      Problem: SAFETY ADULT  Goal: Maintain or return to baseline ADL function  INTERVENTIONS:  -  Assess patient's ability to carry out ADLs; assess patient's baseline for ADL function and identify physical deficits which impact ability to perform ADLs (bathing, care of mouth/teeth, toileting, grooming, dressing, etc )  - Assess/evaluate cause of self-care deficits   - Assess range of motion  - Assess patient's mobility; develop plan if impaired  - Assess patient's need for assistive devices and provide as appropriate  - Encourage maximum independence but intervene and supervise when necessary  ¯ Involve family in performance of ADLs  ¯ Assess for home care needs following discharge   ¯ Request OT consult to assist with ADL evaluation and planning for discharge  ¯ Provide patient education as appropriate Outcome: Progressing    Goal: Maintain or return mobility status to optimal level  INTERVENTIONS:  - Assess patient's baseline mobility status (ambulation, transfers, stairs, etc )    - Identify cognitive and physical deficits and behaviors that affect mobility  - Identify mobility aids required to assist with transfers and/or ambulation (gait belt, sit-to-stand, lift, walker, cane, etc )  - [place] fall precautions as indicated by assessment  - Record patient progress and toleration of activity level on Mobility SBAR; progress patient to next Phase/Stage  - Instruct patient to call for assistance with activity based on assessment  - Request Rehabilitation consult to assist with strengthening/weightbearing, etc    Outcome: Progressing      Problem: DISCHARGE PLANNING  Goal: Discharge to home or other facility with appropriate resources  INTERVENTIONS:  - Identify barriers to discharge w/patient and caregiver  - Arrange for needed discharge resources and transportation as appropriate  - Identify discharge learning needs (meds, wound care, etc )  - Arrange for interpretive services to assist at discharge as needed  - Refer to Case Management Department for coordinating discharge planning if the patient needs post-hospital services based on physician/advanced practitioner order or complex needs related to functional status, cognitive ability, or social support system   Outcome: Progressing      Problem: Knowledge Deficit  Goal: Patient/family/caregiver demonstrates understanding of disease process, treatment plan, medications, and discharge instructions  Complete learning assessment and assess knowledge base    Interventions:  - Provide teaching at level of understanding  - Provide teaching via preferred learning methods   Outcome: Progressing

## 2017-12-20 NOTE — ASSESSMENT & PLAN NOTE
-Keppra seizure ppx for 8 more doses  -hold SQH and AC  -CT 12/17 shows stable SDH and shift  -F/u with neurosurgery as an outpatient

## 2017-12-20 NOTE — ASSESSMENT & PLAN NOTE
-frequent redirection, avoid polypharmacy, benzodiapzepines  -[facility]

## 2017-12-20 NOTE — PROGRESS NOTES
Progress Note - Keenan Zaldivar 1945, 67 y o  female MRN: [id number]    Unit/Bed#: [facility] 931-01 Encounter: [id number]    Primary Care Provider: Hank Blackmon MD   Date and time admitted to hospital: 12/14/2017 10:37 PM        No new Assessment & Plan notes have been filed under this hospital service since the last note was generated  Service: Trauma  hypothyroid  -on home levothyroxine  Speech: level 3 dysphagia diet  PT/OT  CM    Delirium  Assessment & Plan    -appreciate Geriatrics recs  -on 1:1  -reorient     GERD (gastroesophageal reflux disease)  Assessment & Plan    -pepcid     * Subdural hematoma (HCC)  Assessment & Plan    -Keppra seizure ppx for 1week  -Q4 neuro checks  -hold SQH per neurosurgery  -drains are out          Subjective/Objective   Chief Complaint: none    Subjective: No acute event overnight     Objective:     Meds/Allergies   Prescriptions Prior to Admission   Medication Sig Dispense Refill Last Dose    Bioflavonoid Products (ASTRID C PO) Take 500 mg by mouth   Unknown at Unknown time    calcium-vitamin D 250-100 MG-UNIT per tablet Take 1 tablet by mouth 2 (two) times a day   Unknown at Unknown time    Cyanocobalamin (B-12) 5000 MCG CAPS Take by mouth daily   Unknown at Unknown time    pseudoephedrine (SUDAFED) 30 mg tablet Take 60 mg by mouth every 4 (four) hours as needed for congestion   Unknown at Unknown time    Thyroid (LEVOTHYROXINE-LIOTHYRONINE) 30 MG TABS Take 30 mg by mouth 2 (two) times a day   Unknown at Unknown time       Vitals: Blood pressure 115/59, pulse 89, temperature 98 1 °F (36 7 °C), temperature source Oral, resp  rate 18, height 5' 7" (1 702 m), weight 63 kg (138 lb 14 4 oz), SpO2 97 %, not currently breastfeeding  Body mass index is 21 75 kg/m²   SpO2: SpO2: 97 %    ABG: No results found for: PHART, KOR2PKC, PO2ART, EZH2FTU, Y1NRIRAJ, BEART, SOURCE      Intake/Output Summary (Last 24 hours) at 12/20/17 9785  Last data filed at 12/19/17 1958   Gross per 24 hour   Intake              420 ml   Output                0 ml   Net              420 ml       Invasive Devices     Peripheral Intravenous Line            Peripheral IV 12/19/17 Dorsal (posterior); Left Forearm less than 1 day                Nutrition/GI Proph/Bowel Reg: dysphagia diet    Physical Exam:   GENERAL APPEARANCE: NAD, awake, alert  HEENT: atraumatic, normocephalic, left sided facial droop, left eye pointed to left down with decreased EOM  CV: RRR no MRG  LUNGS: CTA b/l  ABD: soft, NT, ND  EXT: no c/c/e  NEURO: CN II-XII intact with the exception of above, 5/5 hand , full sensation  SKIN: warm dry    Lab Results: Results: I have personally reviewed pertinent reports  Imaging/EKG Studies: Results: I have personally reviewed pertinent reports      Other Studies: reviewed  VTE Prophylaxis: SCDs only

## 2017-12-20 NOTE — DISCHARGE SUMMARY
Discharge- Dean Chu 1945, 67 y o  female MRN: [id number]    [facility] Encounter: [id number]    Primary Care Provider: Raford Kawasaki, MD   Date and time admitted to hospital: 12/14/2017 10:37 PM    Dysphagia   Assessment & Plan    -continue puree diet        Visual impairment   Assessment & Plan    -keep glasses nearby          Physical deconditioning   Assessment & Plan    -rehab  -f/u with [facility] positive [address] [place]    -rehab        Ambulatory dysfunction   Assessment & Plan    -rehab        Cognitive impairment   Assessment & Plan    -frequent redirection, avoid polypharmacy, benzodiapzepines  -f/u with [facility] as an outpatient         Delirium   Assessment & Plan    -on 1:1  -reorient        GERD (gastroesophageal reflux disease)   Assessment & Plan    -Continue Pepcid as needed         Dysarthria   Assessment & Plan    -Continue PT/OT at rehab        * Subdural hematoma ([address] )   Assessment & Plan    -Keppra seizure ppx for 8 more doses  -hold SQH and AC  -CT 12/17 shows stable SDH and shift  -F/u with neurosurgery as an outpatient           Admission Date: 12/14/2017     Admitting Diagnosis: Unspecified multiple injuries, initial encounter [T07  XXXA]    HPI: Dean Chu is a 67 y o  female who presents with dysarthria  Per [facility] ED, the  states that this started this morning  The patient fell yesterday onto her backside at the food store yesterday  The patient's history includes a fall on 8/1 leading to left sided weakness and dysarthria  She came in as a trauma evaluation  CT head showed small pontine lacunar infract  It was recommended that she stay for further evaluation but she left AMA  She then returned 8/4 at insistence of family  Imaging at this time showed 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 and SAH  She was hospitalized and discharged to rehab  CT done on 8/23 showed b/l stable subdural hygromas   On 9/26 she presented for trauma evaluation following recurrent falls on ASA  CT head done then showed "increased chronic left hemispheric subdural hygroma and decreased R subdural hygroma"  Once again, patient and  insisted on leaving  They were instructed to follow up outpatient with neurosurgery       CT head was performed at [facility] showing large L acute on chronic subdural hematoma with local mass effect where her left chronic hygroma was   denies use of aspirin to [facility]  Procedures Performed: No orders of the defined types were placed in this encounter  Hospital Course: Admitted to the ICU, underwent Left craniotomy for SDH evacuation  On POD 2 patient removed subdural and subgleal drains  Repeat CT head unchanged on 12/17  Patient confused, dysarthric, with right lateral gaze at baseline patient was placed on a 1:1 and geriatrics was consulted  Confusion improved  Speech pathology evaluated and recommended a dysphagia level 3 diet  Patient is stable and ready for discharge to inpatient rehab  Significant Findings, Care, Treatment and Services Provided:   Acute on Chronic SDH: craniotomy, drains placed, repeat imaging stable    Complications: None     Condition at Discharge: good     Discharge instructions/Information to patient and family:   See after visit summary for information provided to patient and family  Provisions for Follow-Up Care:  See after visit summary for information related to follow-up care and any pertinent home health orders  Disposition: Skilled nursing facility at [facility]    Planned Readmission: No    Discharge Statement   I spent 30 minutes discharging the patient  This time was spent on the day of discharge  I had direct contact with the patient on the day of discharge  Additional documentation is required if more than 30 minutes were spent on discharge       Discharge Medications:  See after visit summary for reconciled discharge medications provided to patient and family

## 2017-12-21 LAB — GLUCOSE SERPL-MCNC: 90 MG/DL (ref 65–140)

## 2017-12-28 ENCOUNTER — ALLSCRIPTS OFFICE VISIT (OUTPATIENT)
Dept: OTHER | Facility: OTHER | Age: 72
End: 2017-12-28

## 2018-01-04 NOTE — CASE MANAGEMENT
Notification of Discharge  This is a Notification of Discharge from our facility [address]  Please be advised that this patient has been discharge from our facility  Below you will find the admission and discharge date and time including the patients disposition  PRESENTATION DATE: 12/14/2017 10:37 PM  IP ADMISSION DATE: 12/15/17 0010  DISCHARGE DATE: 12/20/2017 12:23 PM  DISPOSITION: Released to SNF/TCU/SNU [address] [place] [address] in the [facility] by Rubén Teran for 2017  Network Utilization Review Department  Phone: [phone]; Fax [phone]  ATTENTION: The Network Utilization Review Department is now centralized for our 7 Facilities  Make a note that we have a new phone and fax numbers for our Department  Please call with any questions or concerns to [phone] and carefully follow the prompts so that you are directed to the right person  All voicemails are confidential  Fax any determinations, approvals, denials, and requests for initial or continue stay review clinical to [phone]  Due to HIGH CALL volume, it would be easier if you could please send faxed requests to expedite your requests and in part, help us provide discharge notifications faster

## 2018-01-11 ENCOUNTER — GENERIC CONVERSION - ENCOUNTER (OUTPATIENT)
Dept: OTHER | Facility: OTHER | Age: 73
End: 2018-01-11

## 2018-01-12 NOTE — MISCELLANEOUS
Message   Recorded as Task   Date: 09/06/2016 12:13 PM, Created By: Cayetano Capellan   Task Name: Follow Up   Assigned To: [address] [place] [address]   Regarding Patient: Johnna Ramsey, Status: Active   Comment:    Swapna Rodgers - 06 Sep 2016 12:13 PM     TASK CREATED  please notify pt that her colon cancer screening stool cards were negative for blood and mean risk for colon CA is low risk; no further BW needed at this time   Thu Burgess - 06 Sep 2016 4:18 PM     TASK REASSIGNED: Previously Assigned To [facility]      Left message   Damaris Key - 08 Sep 2016 2:41 PM     TASK REPLIED TO: Previously Assigned To [facility]  Lm for callback   Martha Gale - 08 Sep 2016 2:53 PM     TASK REPLIED TO: Previously Assigned To [facility]  patient aware        Active Problems    1  History of Brain neoplasm (239 6) (D49 6)   2  Colon cancer screening (V76 51) (Z12 11)   3  COPD (chronic obstructive pulmonary disease) (496) (J44 9)   4  Encounter for screening mammogram for malignant neoplasm of breast (V76 12)   (Z12 31)   5  Esophageal dysmotility (530 5) (K22 4)   6  Exotropia of right eye (378 10) (H50 10)   7  GERD (gastroesophageal reflux disease) (530 81) (K21 9)   8  Medicare annual wellness visit, subsequent (V70 0) (Z00 00)   9  Osteoporosis (733 00) (M81 0)   10  Screening for lipoid disorders (V77 91) (Z13 220)   11  Screening for thyroid disorder (V77 0) (Z13 29)   12  Vitamin D deficiency (268 9) (E55 9)    Current Meds   1  Calcium Carbonate-Vitamin D 600-400 MG-UNIT Oral Tablet; TAKE AS DIRECTED; Therapy: 75FOL9113 to Recorded   2  Coenzyme Q10-Levocarnitine  MG Oral Capsule; 1 tab po q day; Therapy: 07OYL4254 to Recorded   3  Lycopene Oral Capsule; 1 tab po q day; Therapy: 85ISL4875 to Recorded   4  Multivitamins Oral Capsule; TAKE 1 CAPSULE DAILY; Therapy: 30QFZ4609 to (Evaluate:75Byl9711) Recorded   5   Phosphatidylserine 100 MG Oral Capsule; 3 TAB DAILY; Therapy: 26YBN6145 to Recorded    Allergies    1  barium sulfate   2  loperamide   3  Penicillins   4  Sulfa Drugs   5  Zithromax TABS   6   Ancef    Signatures   Electronically signed by : Be Flores, ; Sep  8 2016  2:53PM EST                       (Author)

## 2018-01-15 NOTE — MISCELLANEOUS
Message   Date: 31 Aug 2017 9:43 AM EST, Recorded By: Tomás Arcos For: Satnam Catching: Misty Polancoer, Spouse   Phone: [phone] (Home)   Reason: Medical Complaint   Patient's  Viky Mercer called and he wanted to cancel patient's 2 wk hospital f/u with Titi Head scheduled for 9/6/17  He was frustrated because he said that "since she had a cat scan done in the hospital that showed that she is stable, why would she need to get another study done"? He also said that she is too weak right now to do all of these appointments  Explained to Viky Mercer that it is very important that she gets this study done since his wife had a bleed and we need to make sure that she is still stable and that nothing has changed since her hospital discharge  Viky Mercer is still not in agreement and said that he will have to contact us at a better time  Explained to him that I will be documenting his refusal to take her for the study at this time and he verbalized an understanding  Viky Mercer was provided our phone number to call us when he is ready to schedule her appt  Active Problems    1  History of Brain neoplasm (239 6) (D49 6)   2  Colon cancer screening (V76 51) (Z12 11)   3  COPD (chronic obstructive pulmonary disease) (496) (J44 9)   4  Encounter for screening mammogram for malignant neoplasm of breast (V76 12)   (Z12 31)   5  Esophageal dysmotility (530 5) (K22 4)   6  Exotropia of right eye (378 10) (H50 10)   7  GERD (gastroesophageal reflux disease) (530 81) (K21 9)   8  Medicare annual wellness visit, subsequent (V70 0) (Z00 00)   9  Osteoporosis (733 00) (M81 0)   10  Screening for lipoid disorders (V77 91) (Z13 220)   11  Screening for thyroid disorder (V77 0) (Z13 29)   12  Vitamin D deficiency (268 9) (E55 9)    Current Meds   1  Calcium Carbonate-Vitamin D 600-400 MG-UNIT Oral Tablet; TAKE AS DIRECTED; Therapy: 54EMI4619 to Recorded   2   Coenzyme Q10-Levocarnitine  MG Oral Capsule; 1 tab po q day; Therapy: 67FIA3377 to Recorded   3  Lycopene Oral Capsule; 1 tab po q day; Therapy: 93WRZ5388 to Recorded   4  Multivitamins Oral Capsule; TAKE 1 CAPSULE DAILY; Therapy: 45DFU4247 to (Evaluate:22Lew0908) Recorded   5  Phosphatidylserine 100 MG Oral Capsule; 3 TAB DAILY; Therapy: 63EZV8440 to Recorded    Allergies    1  barium sulfate   2  loperamide   3  Penicillins   4  Sulfa Drugs   5  Zithromax TABS   6   Ancef    Signatures   Electronically signed by : MariaG uadalupe Gonzales RN; Aug 31 2017  9:47AM EST                       (Author)

## 2018-01-16 NOTE — PROGRESS NOTES
Assessment    1  Medicare annual wellness visit, subsequent (V70 0) (Z00 00)   2  Encounter for screening mammogram for malignant neoplasm of breast (V76 12)   (Z12 31)   3  Osteoporosis (733 00) (M81 0)   4  Screening for lipoid disorders (V77 91) (Z13 220)   5  Screening for thyroid disorder (V77 0) (Z13 29)   6  Colon cancer screening (V76 51) (Z12 11)    Plan  Health Maintenance, Osteoporosis, Screening for lipoid disorders, Screening for thyroid  disorder, Vitamin D deficiency    · (1) TSH WITH FT4 REFLEX; Status:Active; Requested for:79Qdf0923; Health Maintenance, Osteoporosis, Screening for lipoid disorders, Vitamin D deficiency    · (1) LIPID PANEL, FASTING; Status:Active; Requested for:13Pmy8161; Health Maintenance, Osteoporosis, Vitamin D deficiency    · (1) CBC/ PLT (NO DIFF); Status:Active; Requested for:60Fei5299;    · (1) COMPREHENSIVE METABOLIC PANEL; Status:Active; Requested for:80Nla8376;    · (1) VITAMIN D 25-HYDROXY; Status:Active; Requested for:39Oyg9131;   Osteoporosis    · * DXA BONE DENSITY SPINE HIP AND PELVIS; Status:Hold For - Scheduling; Requested  for:80Nqs6983;   PMH: Brain neoplasm, Colon cancer screening    · (1) OCCULT BLOOD, FECAL IMMUNOCHEMICAL TEST; Status:Active; Requested  for:97Xfl8893;     Discussion/Summary    Urged to do mammo/DEXA/FOBT - deferring colo    Deferring all immunizations    Order for BW given but pt states she gets hypoglycemic when she fasts but she will attempt    Urged to f/u with eye doc  Impression: Subsequent Annual Wellness Visit, with preventive exam as well as age and risk appropriate counseling completed  Cardiovascular screening and counseling: screening not indicated and due for a lipid panel  Diabetes screening and counseling: due for blood glucose  Colorectal cancer screening and counseling: due for fecal occult blood testing     Breast cancer screening and counseling: due for a screening mammogram    Cervical cancer screening and counseling: the patient declines screening  Osteoporosis screening and counseling: due for DXA axial skeleton  HIV screening and counseling: screening not indicated  Immunizations: influenza vaccination is recommended annually, the patient declines the pneumococcal vaccination, hepatitis B vaccination status is unknown, the patient declines the Zostavax vaccine and the patient declines the Td vaccine  Advance Directive Planning: she was encouraged to follow-up with me to discuss her questions and/or decisions  Patient Discussion: follow-up visit needed in one year  Chief Complaint  AWV      History of Present Illness  The patient is being seen for the subsequent annual wellness visit  Medicare Screening and Risk Factors   Hospitalizations: she has been previously hospitalizied, she has been hospitalized 9 times and no recent hospitalizations  Once per lifetime medicare screening tests: AAA screening US has not yet been done  Medicare Screening Tests Risk Questions   Abdominal aortic aneurysm risk assessment: tobacco use  Osteoporosis risk assessment: , female gender, over 48years of age and tobacco use  HIV risk assessment: none indicated  Drug and Alcohol Use: The patient currently smokes 1 pack a day packs per day  She is not ready to quit using tobacco  The patient reports never drinking alcohol  Alcohol concern:  no personal concern about alcohol use  Diet and Physical Activity: Current diet includes well balanced meals, low fat food choices, 1 servings of fruit per day, 2 servings of vegetables per day, 1 servings of meat per day, 2 servings of whole grains per day, 3 servings of simple carbohydrates per day, 2 servings of dairy products per day, 10 cups of coffee per day, 0 cups of tea per day, 0 cans of regular soda per day and 0 cans of diet soda per day  She exercises daily  Exercise: walking 40 minutes per day     Mood Disorder and Cognitive Impairment Screening: PHQ-9 Depression Scale   Over the past 2 weeks, how often have you been bothered by the following problems? 1 ) Little interest or pleasure in doing things? Not at all    2 ) Feeling down, depressed or hopeless? Several days  3 ) Trouble falling asleep or sleeping too much? Several days  4 ) Feeling tired or having little energy? Several days  5 ) Poor appetite or overeating? Not at all    6 ) Feeling bad about yourself, or that you are a failure, or have let yourself or your family down? Several days  7 ) Trouble concentrating on things, such as reading a newspaper or watching television? Several days  8 ) Moving or speaking so slowly that other people could have noticed, or the opposite, moving or speaking faster than usual? Several days  TOTAL SCORE: 6    How difficult have these problems made it for you to do your work, take care of things at home, or get along with people? Somewhat difficult  Cognitive impairment screening: denies difficulty handling complex tasks, denies difficulty with reasoning, denies difficulty with language and denies difficulty with behavior  Functional Ability/Level of Safety: Hearing is normal bilaterally and a hearing aid is not used  The patient is currently able to do activities of daily living with limitations, unable to participate in social activities and unable to drive  Activities of daily living details: transportation help needed and needs help managing money, but no meal preparation help needed, does not need help doing housework, does not need help doing laundry and does not need help managing medications  Fall risk factors: The patient fell 0 times in the past 12 months  Home safety risk factors:  loose rugs, but no uneven floors, no household clutter, grab bars in the bathroom and handrails on the stairs  Advance Directives: Advance directives: durable power of  for health care directives, but no living will and no advance directives     Co-Managers and Medical Equipment/Suppliers: See Patient Care Team     Last Medicare Wellness Visit Information was reviewed, patient interviewed and updates made to the record today  Patient Care Team    Care Team Member Role Specialty Office Number         [employer]        Active Problems    1  History of Brain neoplasm (239 6) (D49 6)   2  Colon cancer screening (V76 51) (Z12 11)   3  COPD (chronic obstructive pulmonary disease) (496) (J44 9)   4  Encounter for screening mammogram for malignant neoplasm of breast (V76 12)   (Z12 31)   5  Esophageal dysmotility (530 5) (K22 4)   6  Exotropia of right eye (378 10) (H50 10)   7  GERD (gastroesophageal reflux disease) (530 81) (K21 9)   8  Osteoporosis (733 00) (M81 0)   9  Vitamin D deficiency (268 9) (E55 9)    Past Medical History    1  History of Brain neoplasm (239 6) (D49 6)   2  History of Erosive esophagitis (530 19) (K22 10)   3  History of esophageal ulcer (V12 79) (Z87 19)   4  History of fracture of pelvis (V15 51) (Z87 81)   5  History of fracture of skull (V15 51) (Z87 81)   6  History of mastoiditis (V12 09) (Z86 69)   7  History of renal calculi (V13 01) (Z87 442)   8  History of Splenic laceration (865 09) (Q92 892K)    The active problems and past medical history were reviewed and updated today  Surgical History    1  History of Appendectomy   2  History of Brain Surgery   3  History of Cholecystectomy   4  History of Craniotomy Tumor Removal - Partial   5  History of Cystoscopy With Insertion Of Ureteral Stent   6  History of Dental Surgery   7  History of Diagnostic Esophagogastroduodenoscopy   8  History of Extrapleural Rib Resection   9  History of Hand Excision Of Tendon Cyst   10  History of Hysterectomy   11  History of Laparoscop Excision Gynecologic Lesions Left Fallopian Tube   12  History of Lung Lobectomy   13  History of Splenectomy   14   History of Transurethral Removal Of Internally Dwelling Ureteral Stent    The surgical history was reviewed and updated today  Family History  Mother    1  Family history of Coronary disease   2  Family history of    3  Family history of hypertension (V17 49) (Z82 49)   4  Family history of thyroid disease (V18 19) (Z83 49)  Father    5  Family history of Colon malignancy   6  Family history of     The family history was reviewed and updated today  Social History    · Current every day smoker (305 1) (F17 200)   ·    · Non drinker / no alcohol use   · Retired  The social history was reviewed and is unchanged  Current Meds   1  Calcium Carbonate-Vitamin D 600-400 MG-UNIT Oral Tablet; TAKE AS DIRECTED; Therapy: 85ORE4223 to Recorded   2  Coenzyme Q10-Levocarnitine  MG Oral Capsule; 1 tab po q day; Therapy: 15LOR9408 to Recorded   3  Lycopene Oral Capsule; 1 tab po q day; Therapy: 54PGE3194 to Recorded   4  Multivitamins Oral Capsule; TAKE 1 CAPSULE DAILY; Therapy: 39CVC5000 to (Evaluate:93Wfs5634) Recorded   5  Phosphatidylserine 100 MG Oral Capsule; 3 TAB DAILY; Therapy: 11UTX3253 to Recorded    The medication list was reviewed and updated today  Allergies    1  barium sulfate   2  loperamide   3  Penicillins   4  Sulfa Drugs   5  Zithromax TABS   6  Ancef    Immunizations  Influenza --- Annabelle Likes: 18-Oct-2015     Vitals  Signs   Recorded: 55FLZ7132 38:39XS   Systolic: 847  Diastolic: 78  Heart Rate: 70  Temperature: 97 5 F  Height: 5 ft 6 in  Weight: 136 lb   BMI Calculated: 21 95  BSA Calculated: 1 71    Results/Data  PHQ-2 Adult Depression Screening 14Bha5367 02:35PM User, Ahs     Test Name Result Flag Reference   PHQ-2 Adult Depression Score 1     Over the last two weeks, how often have you been bothered by any of the following problems?   Little interest or pleasure in doing things: Not at all - 0  Feeling down, depressed, or hopeless: Several days - 1   PHQ-2 Adult Depression Screening Negative         Procedure    Procedure: Visual Acuity Test  Indication: routine screening     Inforrmation supplied by [employer]    Results: 20/50 in both eyes with corrective device, 20/200 in the right eye with corrective device, 20/50 in the left eye with corrective device      Signatures   Electronically signed by : Randi Koenig DO; Aug 23 2016  3:13PM EST                       (Author)

## 2018-01-23 ENCOUNTER — APPOINTMENT (EMERGENCY)
Dept: CT IMAGING | Facility: HOSPITAL | Age: 73
End: 2018-01-23
Payer: COMMERCIAL

## 2018-01-23 ENCOUNTER — HOSPITAL ENCOUNTER (INPATIENT)
Facility: HOSPITAL | Age: 73
LOS: 8 days | DRG: 026 | End: 2018-01-31
Attending: SURGERY | Admitting: SURGERY
Payer: COMMERCIAL

## 2018-01-23 ENCOUNTER — HOSPITAL ENCOUNTER (EMERGENCY)
Facility: HOSPITAL | Age: 73
End: 2018-01-23
Attending: EMERGENCY MEDICINE | Admitting: EMERGENCY MEDICINE
Payer: COMMERCIAL

## 2018-01-23 VITALS
DIASTOLIC BLOOD PRESSURE: 74 MMHG | RESPIRATION RATE: 20 BRPM | BODY MASS INDEX: 21.61 KG/M2 | SYSTOLIC BLOOD PRESSURE: 128 MMHG | WEIGHT: 138 LBS | HEART RATE: 72 BPM | OXYGEN SATURATION: 97 % | TEMPERATURE: 96.5 F

## 2018-01-23 DIAGNOSIS — I62.03 ACUTE ON CHRONIC INTRACRANIAL SUBDURAL HEMATOMA (HCC): Primary | ICD-10-CM

## 2018-01-23 DIAGNOSIS — I62.01 ACUTE ON CHRONIC INTRACRANIAL SUBDURAL HEMATOMA (HCC): Primary | ICD-10-CM

## 2018-01-23 DIAGNOSIS — I60.9 SAH (SUBARACHNOID HEMORRHAGE) (HCC): ICD-10-CM

## 2018-01-23 DIAGNOSIS — R13.10 DYSPHAGIA, UNSPECIFIED TYPE: ICD-10-CM

## 2018-01-23 DIAGNOSIS — R47.1 DYSARTHRIA: ICD-10-CM

## 2018-01-23 DIAGNOSIS — R41.89 COGNITIVE IMPAIRMENT: ICD-10-CM

## 2018-01-23 DIAGNOSIS — R26.2 AMBULATORY DYSFUNCTION: ICD-10-CM

## 2018-01-23 DIAGNOSIS — R29.898 WEAKNESS OF EXTREMITY: ICD-10-CM

## 2018-01-23 DIAGNOSIS — R53.81 PHYSICAL DECONDITIONING: ICD-10-CM

## 2018-01-23 DIAGNOSIS — H54.7 VISUAL IMPAIRMENT: ICD-10-CM

## 2018-01-23 DIAGNOSIS — R58 BLEEDING: ICD-10-CM

## 2018-01-23 DIAGNOSIS — S06.5X9A SUBDURAL HEMATOMA (HCC): Primary | ICD-10-CM

## 2018-01-23 PROBLEM — N39.0 URINARY TRACT INFECTION: Status: ACTIVE | Noted: 2018-01-23

## 2018-01-23 LAB
ABO GROUP BLD: NORMAL
ANION GAP SERPL CALCULATED.3IONS-SCNC: 8 MMOL/L (ref 4–13)
APTT PPP: 24 SECONDS (ref 23–35)
BACTERIA UR QL AUTO: ABNORMAL /HPF
BASOPHILS # BLD AUTO: 0.03 THOUSANDS/ΜL (ref 0–0.1)
BASOPHILS NFR BLD AUTO: 0 % (ref 0–1)
BILIRUB UR QL STRIP: NEGATIVE
BLD GP AB SCN SERPL QL: NEGATIVE
BUN SERPL-MCNC: 11 MG/DL (ref 5–25)
CALCIUM SERPL-MCNC: 8.9 MG/DL (ref 8.3–10.1)
CHLORIDE SERPL-SCNC: 99 MMOL/L (ref 100–108)
CLARITY UR: ABNORMAL
CO2 SERPL-SCNC: 25 MMOL/L (ref 21–32)
COLOR UR: YELLOW
CREAT SERPL-MCNC: 0.64 MG/DL (ref 0.6–1.3)
EOSINOPHIL # BLD AUTO: 0.08 THOUSAND/ΜL (ref 0–0.61)
EOSINOPHIL NFR BLD AUTO: 1 % (ref 0–6)
ERYTHROCYTE [DISTWIDTH] IN BLOOD BY AUTOMATED COUNT: 13.8 % (ref 11.6–15.1)
GFR SERPL CREATININE-BSD FRML MDRD: 89 ML/MIN/1.73SQ M
GLUCOSE SERPL-MCNC: 100 MG/DL (ref 65–140)
GLUCOSE SERPL-MCNC: 75 MG/DL (ref 65–140)
GLUCOSE UR STRIP-MCNC: NEGATIVE MG/DL
HCT VFR BLD AUTO: 38.7 % (ref 34.8–46.1)
HGB BLD-MCNC: 12.8 G/DL (ref 11.5–15.4)
HGB UR QL STRIP.AUTO: ABNORMAL
INR PPP: 1.03 (ref 0.86–1.16)
KETONES UR STRIP-MCNC: ABNORMAL MG/DL
LEUKOCYTE ESTERASE UR QL STRIP: ABNORMAL
LYMPHOCYTES # BLD AUTO: 1.36 THOUSANDS/ΜL (ref 0.6–4.47)
LYMPHOCYTES NFR BLD AUTO: 19 % (ref 14–44)
MCH RBC QN AUTO: 29.7 PG (ref 26.8–34.3)
MCHC RBC AUTO-ENTMCNC: 33.1 G/DL (ref 31.4–37.4)
MCV RBC AUTO: 90 FL (ref 82–98)
MONOCYTES # BLD AUTO: 0.51 THOUSAND/ΜL (ref 0.17–1.22)
MONOCYTES NFR BLD AUTO: 7 % (ref 4–12)
NEUTROPHILS # BLD AUTO: 5.39 THOUSANDS/ΜL (ref 1.85–7.62)
NEUTS SEG NFR BLD AUTO: 73 % (ref 43–75)
NITRITE UR QL STRIP: NEGATIVE
NON-SQ EPI CELLS URNS QL MICRO: ABNORMAL /HPF
PH UR STRIP.AUTO: 7 [PH] (ref 4.5–8)
PLATELET # BLD AUTO: 426 THOUSANDS/UL (ref 149–390)
PMV BLD AUTO: 8.6 FL (ref 8.9–12.7)
POTASSIUM SERPL-SCNC: 4.1 MMOL/L (ref 3.5–5.3)
PROT UR STRIP-MCNC: NEGATIVE MG/DL
PROTHROMBIN TIME: 13.5 SECONDS (ref 12.1–14.4)
RBC # BLD AUTO: 4.31 MILLION/UL (ref 3.81–5.12)
RBC #/AREA URNS AUTO: ABNORMAL /HPF
RH BLD: NEGATIVE
SODIUM SERPL-SCNC: 132 MMOL/L (ref 136–145)
SP GR UR STRIP.AUTO: 1.01 (ref 1–1.03)
SPECIMEN EXPIRATION DATE: NORMAL
UROBILINOGEN UR QL STRIP.AUTO: 0.2 E.U./DL
WBC # BLD AUTO: 7.37 THOUSAND/UL (ref 4.31–10.16)
WBC #/AREA URNS AUTO: ABNORMAL /HPF

## 2018-01-23 PROCEDURE — 85025 COMPLETE CBC W/AUTO DIFF WBC: CPT | Performed by: EMERGENCY MEDICINE

## 2018-01-23 PROCEDURE — 86923 COMPATIBILITY TEST ELECTRIC: CPT

## 2018-01-23 PROCEDURE — 93005 ELECTROCARDIOGRAM TRACING: CPT

## 2018-01-23 PROCEDURE — 99285 EMERGENCY DEPT VISIT HI MDM: CPT

## 2018-01-23 PROCEDURE — 81001 URINALYSIS AUTO W/SCOPE: CPT | Performed by: EMERGENCY MEDICINE

## 2018-01-23 PROCEDURE — 85730 THROMBOPLASTIN TIME PARTIAL: CPT | Performed by: SURGERY

## 2018-01-23 PROCEDURE — 85610 PROTHROMBIN TIME: CPT | Performed by: SURGERY

## 2018-01-23 PROCEDURE — 86850 RBC ANTIBODY SCREEN: CPT | Performed by: EMERGENCY MEDICINE

## 2018-01-23 PROCEDURE — 70450 CT HEAD/BRAIN W/O DYE: CPT

## 2018-01-23 PROCEDURE — 86900 BLOOD TYPING SEROLOGIC ABO: CPT | Performed by: EMERGENCY MEDICINE

## 2018-01-23 PROCEDURE — 82948 REAGENT STRIP/BLOOD GLUCOSE: CPT

## 2018-01-23 PROCEDURE — 86901 BLOOD TYPING SEROLOGIC RH(D): CPT | Performed by: EMERGENCY MEDICINE

## 2018-01-23 PROCEDURE — 80048 BASIC METABOLIC PNL TOTAL CA: CPT | Performed by: EMERGENCY MEDICINE

## 2018-01-23 PROCEDURE — 36415 COLL VENOUS BLD VENIPUNCTURE: CPT | Performed by: EMERGENCY MEDICINE

## 2018-01-23 RX ORDER — LEVOTHYROXINE SODIUM 0.03 MG/1
25 TABLET ORAL
Status: DISCONTINUED | OUTPATIENT
Start: 2018-01-24 | End: 2018-01-31 | Stop reason: HOSPADM

## 2018-01-23 RX ORDER — CHLORHEXIDINE GLUCONATE 0.12 MG/ML
15 RINSE ORAL EVERY 12 HOURS SCHEDULED
Status: DISCONTINUED | OUTPATIENT
Start: 2018-01-23 | End: 2018-01-26

## 2018-01-23 RX ORDER — BISACODYL 10 MG
10 SUPPOSITORY, RECTAL RECTAL DAILY PRN
Status: DISCONTINUED | OUTPATIENT
Start: 2018-01-23 | End: 2018-01-25

## 2018-01-23 RX ORDER — SODIUM CHLORIDE 9 MG/ML
100 INJECTION, SOLUTION INTRAVENOUS CONTINUOUS
Status: DISCONTINUED | OUTPATIENT
Start: 2018-01-23 | End: 2018-01-25

## 2018-01-23 RX ORDER — NITROFURANTOIN 25; 75 MG/1; MG/1
100 CAPSULE ORAL 2 TIMES DAILY
Status: DISPENSED | OUTPATIENT
Start: 2018-01-23 | End: 2018-01-28

## 2018-01-23 RX ORDER — ACETAMINOPHEN 325 MG/1
650 TABLET ORAL EVERY 6 HOURS PRN
Status: DISCONTINUED | OUTPATIENT
Start: 2018-01-23 | End: 2018-01-25

## 2018-01-23 RX ORDER — DOCUSATE SODIUM 100 MG/1
100 CAPSULE, LIQUID FILLED ORAL 2 TIMES DAILY
Status: DISCONTINUED | OUTPATIENT
Start: 2018-01-23 | End: 2018-01-31 | Stop reason: HOSPADM

## 2018-01-23 RX ADMIN — SODIUM CHLORIDE 75 ML/HR: 0.9 INJECTION, SOLUTION INTRAVENOUS at 19:00

## 2018-01-23 RX ADMIN — NITROFURANTOIN (MONOHYDRATE/MACROCRYSTALS) 100 MG: 75; 25 CAPSULE ORAL at 20:43

## 2018-01-23 RX ADMIN — DOCUSATE SODIUM 100 MG: 100 CAPSULE, LIQUID FILLED ORAL at 20:58

## 2018-01-23 RX ADMIN — CHLORHEXIDINE GLUCONATE 15 ML: 1.2 RINSE ORAL at 20:42

## 2018-01-23 NOTE — H&P
H&P Exam - Trauma   Naty Mars 67 y o  female MRN: [id number]  Unit/Bed#: ED 22 Encounter: [id number]    Assessment/Plan   Trauma Alert: Level B  Model of Arrival: Transfer [facility]  Trauma Team: Attending Natalie Lora and Residents Jessica Lee  Consultants: Neurosurgery    Trauma Active Problems:   Acute on chronic subdural hemorrhage with increasing shift    Trauma Plan:   Admit to intensive care unit  Neurosurgical consultation    Chief Complaint: Dizzy    History of Present Illness   HPI:  Naty Mars is a 67 y o  female who presents as a transfer from [facility]  She suffered multiple falls this past weekend and was noted to have slurred speech and instability today  She has had multiple falls with subdurals, and was recently discharged home from rehab  Not on any blood thinners or antiplatelets  Unknown LOC  Mechanism:Fall    Review of Systems   Constitutional: Positive for activity change  HENT: Negative  Eyes: Negative  Respiratory: Negative  Cardiovascular: Negative  Gastrointestinal: Negative  Endocrine: Negative  Genitourinary: Negative  Musculoskeletal: Negative  Skin: Negative  Allergic/Immunologic: Negative  Neurological: Positive for dizziness, weakness and light-headedness  Hematological: Negative  Psychiatric/Behavioral: Negative          Historical Information     Past Medical History:   Diagnosis Date    Chronic sinusitis     GERD (gastroesophageal reflux disease)     Hypotension     Seizures ([address] )      Past Surgical History:   Procedure Laterality Date    ABDOMINAL SURGERY      BRAIN SURGERY      CLOSED REDUCTION DISTAL FEMUR FRACTURE      CRANIOTOMY      CRANIOTOMY Left 12/15/2017    Procedure: Left fronto-temporal craniotomy for evacuation of subdural hematoma and membrane stripping;  Surgeon: Nicci Mcclendon MD;  Location: [facility];  Service: Neurosurgery    HYSTERECTOMY      HYSTERECTOMY      LUNG LOBECTOMY Right     OOPHORECTOMY      SPLENECTOMY       Social History   History   Alcohol Use No     History   Drug Use No     History   Smoking Status    Current Every Day Smoker    Packs/day: 1 00    Types: Cigarettes   Smokeless Tobacco    Never Used     Immunization History   Administered Date(s) Administered    Influenza TIV (IM) 10/18/2015     Last Tetanus: unknown  Family History: Non-contributory    Meds/Allergies   PTA meds:   Prior to Admission Medications   Prescriptions Last Dose Informant Patient Reported?  Taking?   acetaminophen (TYLENOL) 325 mg tablet   No No   Sig: Take 2 tablets every 6 hours as needed for pain   bisacodyl (DULCOLAX) 10 mg suppository   No No   Sig: Insert 1 suppository into the rectum daily as needed for constipation   cholecalciferol 1000 units tablet   No No   Sig: Take 1 tablet by mouth daily   docusate sodium (COLACE) 100 mg capsule   No No   Sig: Take 1 capsule by mouth 2 (two) times a day   levETIRAcetam (KEPPRA) 500 mg tablet   No No   Sig: Take 1 tablet by mouth every 12 (twelve) hours for 8 doses   levothyroxine 25 mcg tablet   No No   Sig: Take 1 tablet by mouth daily in the early morning   nicotine (NICODERM CQ) 14 mg/24hr TD 24 hr patch   No No   Sig: Place 1 patch on the skin daily      Facility-Administered Medications: None       Allergies   Allergen Reactions    Acetazolamide      Other reaction(s): Unknown Allergic Reaction    Azithromycin      Other reaction(s): GI Upset    Barium Sulfate      Other reaction(s): GI Upset    Cefazolin     Loperamide      Other reaction(s): Unknown Allergic Reaction    Penicillins      Other reaction(s): Fever    Procaine Other (See Comments)     fever    Sulfa Antibiotics      fever         PHYSICAL EXAM    PE limited by: n/a    Objective   Vitals:   First set: Temperature: 97 7 °F (36 5 °C) (01/23/18 1630)  Pulse: 78 (01/23/18 1630)  Respirations: 20 (01/23/18 1630)  Blood Pressure: 138/72 (01/23/18 1630)    Primary Survey:   (A) Airway: intact  (B) Breathing: breath sounds bilaterally  (C) Circulation: Pulses:   carotid  2/4, pedal  2/4, radial  2/4 and femoral  2/4  (D) Disabliity:  Eye Opening: To voice = 3, Motor Response: Obeys commands = 6 and Verbal Response:  Oriented = 5  (E) Expose:  Completed    Secondary Survey: (Click on Physical Exam tab above)  Physical Exam   Constitutional: She is oriented to person, place, and time  She appears well-developed  HENT:   Head: Normocephalic  Right Ear: External ear normal    Left Ear: External ear normal    Eyes: EOM are normal  Pupils are equal, round, and reactive to light  3mm and reactive   Neck: Normal range of motion  Cervical spine deformity without tenderness   Cardiovascular: Normal rate  Pulmonary/Chest: Effort normal  No respiratory distress  She exhibits no tenderness  Abdominal: Soft  She exhibits no distension  There is no tenderness  Musculoskeletal: Normal range of motion  No T or L spine tenderness, no stepoffs   Neurological: She is alert and oriented to person, place, and time  Skin: Skin is warm  Psychiatric: She has a normal mood and affect  Her behavior is normal        Invasive Devices     Peripheral Intravenous Line            Peripheral IV 01/23/18 Left Wrist less than 1 day                Lab Results: Results: I have personally reviewed pertinent reports  Imaging/EKG Studies: Results: I have personally reviewed pertinent reports      Other Studies: n/a    Code Status: Level 1 - Full Code  Advance Directive and Living Will:      Power of :    POLST:

## 2018-01-23 NOTE — ED NOTES
Patient alert and oriented  L pupil reactive  Patient able to move all extremities    Patient to be tranported to [facility], [address] [place] [address]  01/23/18 [id number]

## 2018-01-23 NOTE — ED PROVIDER NOTES
History  Chief Complaint   Patient presents with    Altered Mental Status     To ED via EMS for c/o worsening speech difficulties, walking difficulties left greater than right since this morning  Pt has a hx of brain surgery with resisidual dificts  Has not followed with Neurosurgery s/p surgery  Here with multiple complaints  Per , worsening balance and multiple falls over the weekend  This am noted to have worsening slurred speech, now unable to stand  Ambulance called and brought in for evaluation  Woke around 0830 and noted the worsening speech at that time and has continued to worsen all morning  Pt w/ hx of mult CVA, SAH, 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  Was at rehab the end of December and home now 'for a while '   Has had no f/u w/ neuro or neurosurg since discharge - per  'we're not made of money and can't affort all of these visits and copays '  Denies recent illness, no f/c, no cough or congestion        History provided by:  Patient, spouse and EMS personnel   used: No    Neurologic Problem   Location:  Worsening slurred speech, balance difficulty/trouble walking today, multiple falls, incoordination and weakness for days  Quality:  Noted upon waking this am  worsening throughout the day  Severity:  Severe  Onset quality:  Gradual  Timing:  Constant  Progression:  Worsening  Chronicity:  Recurrent  Context:  Hx of CVA, SAH, hygroma w/ drain  Relieved by:  N/a  Worsened by:  N/a  Ineffective treatments:  N/a  Associated symptoms: no chest pain, no cough, no fever, no loss of consciousness, no nausea and no vomiting        Prior to Admission Medications   Prescriptions Last Dose Informant Patient Reported?  Taking?   acetaminophen (TYLENOL) 325 mg tablet   No No   Sig: Take 2 tablets every 6 hours as needed for pain   bisacodyl (DULCOLAX) 10 mg suppository   No No   Sig: Insert 1 suppository into the rectum daily as needed for constipation cholecalciferol 1000 units tablet   No No   Sig: Take 1 tablet by mouth daily   docusate sodium (COLACE) 100 mg capsule   No No   Sig: Take 1 capsule by mouth 2 (two) times a day   levETIRAcetam (KEPPRA) 500 mg tablet   No No   Sig: Take 1 tablet by mouth every 12 (twelve) hours for 8 doses   levothyroxine 25 mcg tablet   No No   Sig: Take 1 tablet by mouth daily in the early morning   nicotine (NICODERM CQ) 14 mg/24hr TD 24 hr patch   No No   Sig: Place 1 patch on the skin daily      Facility-Administered Medications: None       Past Medical History:   Diagnosis Date    Chronic sinusitis     GERD (gastroesophageal reflux disease)     Hypotension     Seizures (HCC)        Past Surgical History:   Procedure Laterality Date    ABDOMINAL SURGERY      BRAIN SURGERY      CLOSED REDUCTION DISTAL FEMUR FRACTURE      CRANIOTOMY      CRANIOTOMY Left 12/15/2017    Procedure: Left fronto-temporal craniotomy for evacuation of subdural hematoma and membrane stripping;  Surgeon: Rey Kaur MD;  [facility]: [facility];  Service: Neurosurgery    HYSTERECTOMY      HYSTERECTOMY      LUNG LOBECTOMY Right     OOPHORECTOMY      SPLENECTOMY         Family History   Problem Relation Age of Onset    Heart disease Mother     Diverticulitis Father      I have reviewed and agree with the history as documented  Social History   Substance Use Topics    Smoking status: Current Every Day Smoker     Packs/day: 1 00     Types: Cigarettes    Smokeless tobacco: Never Used    Alcohol use No        Review of Systems   Constitutional: Negative for fever  Respiratory: Negative for cough  Cardiovascular: Negative for chest pain  Gastrointestinal: Negative for nausea and vomiting  Neurological: Positive for speech difficulty, weakness and light-headedness  Negative for loss of consciousness  All other systems reviewed and are negative        Physical Exam  ED Triage Vitals [01/23/18 1320]   Temperature Pulse Respirations Blood Pressure SpO2   98 °F (36 7 °C) 73 18 134/69 99 %      Temp Source Heart Rate Source Patient Position - Orthostatic VS BP Location FiO2 (%)   Temporal Monitor Lying Right arm --      Pain Score       No Pain           Orthostatic Vital Signs  Vitals:    01/23/18 1445 01/23/18 1500 01/23/18 1529 01/23/18 1530   BP: 123/64 133/69 127/68 128/74   Pulse: 69 68 69 72   Patient Position - Orthostatic VS:           Physical Exam   Constitutional: She is oriented to person, place, and time  She appears well-developed and well-nourished  Chronically ill, appears much older than stated age  HENT:   Nose: Nose normal    Mouth/Throat: Mucous membranes are dry  Dried foot around mouth and on chest and clothes   Eyes:   Right eye w/ lateral deviation - longstanding  Left w/ round and reactive pupil   Neck: Neck supple  Cardiovascular: Normal rate and regular rhythm  Pulmonary/Chest: Effort normal and breath sounds normal    Abdominal: Soft  Musculoskeletal: She exhibits no deformity  Neurological: She is alert and oriented to person, place, and time  GCS eye subscore is 4  GCS verbal subscore is 5  GCS motor subscore is 6  Slight blunting of right nasolabial fold, tongue midline  Mild dysmetria left lower extremity heel to shin, right foot weaker w/ dorsi/plantar flexion compared to left   Skin: Skin is warm  Psychiatric: She has a normal mood and affect  Nursing note and vitals reviewed        ED Medications  Medications - No data to display    Diagnostic Studies  Results Reviewed     Procedure Component Value Units Date/Time    Urine Microscopic [[id number]]  (Abnormal) Collected:  01/23/18 1425    Lab Status:  Final result Specimen:  Urine from Urine, Clean Catch Updated:  01/23/18 1456     RBC, UA 4-10 (A) /hpf      WBC, UA 2-4 (A) /hpf      Epithelial Cells Occasional /hpf      Bacteria, UA Innumerable (A) /hpf     UA w Reflex to Microscopic w Reflex to Culture [[id number]]  (Abnormal) Collected:  01/23/18 1425    Lab Status:  Final result Specimen:  Urine from Urine, Clean Catch Updated:  01/23/18 1448     Color, UA Yellow     Clarity, UA Slightly Cloudy     Specific Gravity, UA 1 015     pH, UA 7 0     Leukocytes, UA Small (A)     Nitrite, UA Negative     Protein, UA Negative mg/dl      Glucose, UA Negative mg/dl      Ketones, UA Trace (A) mg/dl      Urobilinogen, UA 0 2 E U /dl      Bilirubin, UA Negative     Blood, UA Small (A)    Basic metabolic panel [[id number]]  (Abnormal) Collected:  01/23/18 1326    Lab Status:  Final result Specimen:  Blood from Arm, Left Updated:  01/23/18 1359     Sodium 132 (L) mmol/L      Potassium 4 1 mmol/L      Chloride 99 (L) mmol/L      CO2 25 mmol/L      Anion Gap 8 mmol/L      BUN 11 mg/dL      Creatinine 0 64 mg/dL      Glucose 100 mg/dL      Calcium 8 9 mg/dL      eGFR 89 ml/min/1 73sq m     Narrative:         National Kidney Disease Education Program recommendations are as follows:  GFR calculation is accurate only with a steady state creatinine  Chronic Kidney disease less than 60 ml/min/1 73 sq  meters  Kidney failure less than 15 ml/min/1 73 sq  meters      CBC and differential [[id number]]  (Abnormal) Collected:  01/23/18 1326    Lab Status:  Final result Specimen:  Blood from Arm, Left Updated:  01/23/18 1347     WBC 7 37 Thousand/uL      RBC 4 31 Million/uL      Hemoglobin 12 8 g/dL      Hematocrit 38 7 %      MCV 90 fL      MCH 29 7 pg      MCHC 33 1 g/dL      RDW 13 8 %      MPV 8 6 (L) fL      Platelets 108 (H) Thousands/uL      Neutrophils Relative 73 %      Lymphocytes Relative 19 %      Monocytes Relative 7 %      Eosinophils Relative 1 %      Basophils Relative 0 %      Neutrophils Absolute 5 39 Thousands/µL      Lymphocytes Absolute 1 36 Thousands/µL      Monocytes Absolute 0 51 Thousand/µL      Eosinophils Absolute 0 08 Thousand/µL      Basophils Absolute 0 03 Thousands/µL                  CT head without contrast   Final Result by Zaynab Reynolds Fran Wood DO (01/23 1443)   Enlarging mixed attenuation subdural hemorrhage on the left containing chronic, subacute, and acute blood  Increasing effacement of left cerebral sulci (most pronounced at the vertex) and increasing rightward subfalcine shift  Neurosurgical    consultation advised         I personally discussed this study with Tim Ellison on 1/23/2018 2:28 PM          Workstation performed: [id number]                    Procedures  ECG 12 Lead Documentation  Date/Time: 1/23/2018 1:25 PM  Performed by: Amy Knott  Authorized by: Amy Knott     ECG reviewed by me, the ED Provider: yes    Patient location:  ED  Previous ECG:     Previous ECG:  Compared to current    Similarity:  No change  Interpretation:     Interpretation: normal    Rate:     ECG rate:  76    ECG rate assessment: normal    Rhythm:     Rhythm: sinus rhythm    Ectopy:     Ectopy: none    QRS:     QRS axis:  Normal  Conduction:     Conduction: normal    ST segments:     ST segments:  Normal  T waves:     T waves: normal        CriticalCare Time  Performed by: Amy Knott  Authorized by: Amy Knott     Critical care provider statement:     Critical care time (minutes):  35    Critical care was necessary to treat or prevent imminent or life-threatening deterioration of the following conditions:  CNS failure or compromise    Critical care was time spent personally by me on the following activities:  Blood draw for specimens, obtaining history from patient or surrogate, development of treatment plan with patient or surrogate, evaluation of patient's response to treatment, examination of patient, discussions with consultants, review of old charts, re-evaluation of patient's condition, ordering and review of radiographic studies, ordering and review of laboratory studies and ordering and performing treatments and interventions    I assumed direction of critical care for this patient from another provider in my specialty: no             Phone Contacts  ED Phone Contact    ED Course  ED Course as of Jan 23 1948   Tue Jan 23, 2018   1429 Received call from reading room regarding scan,  d/w pt and , called [facility] for transfer    12 Spoke w/ Dr Mike Lackey, NS - needs to come back to [facility] into Neuro ICU  Was previously on the Trauma service    Spoke w/ Sanchez Benavides, [facility] - pt accepted by Dr Katarina Simpson                NIH Stroke Scale    Coralee Curls Most Recent Value   Level of Consciousness (1a )  0 Filed at: 01/23/2018 1400   LOC Questions (1b )  0 Filed at: 01/23/2018 1400   LOC Commands (1c )  0 Filed at: 01/23/2018 1400   Best Gaze (2 )  1 [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] Filed at: 01/23/2018 1400   Visual (3 )  0 [no gross visual deficits to left eye] Filed at: 01/23/2018 1400   Facial Palsy (4 )  1 Filed at: 01/23/2018 1400   Motor Arm, Left (5a )  0 Filed at: 01/23/2018 1400   Motor Arm, Right (5b )  0 Filed at: 01/23/2018 1400   Motor Leg, Left (6a )  0 Filed at: 01/23/2018 1400   Motor Leg, Right (6b )  0 [no drift, but decreased strength w/ dorsi/plantarflexion on the right compared to the left] Filed at: 01/23/2018 1400   Limb Ataxia (7 )  1 Filed at: 01/23/2018 1400   Sensory (8 )  0 Filed at: 01/23/2018 1400   Best Language (9 )  0 Filed at: 01/23/2018 1400   Dysarthria (10 )  1 Filed at: 01/23/2018 1400   Extinction and Inattention (11 ) (Formerly Neglect)  0 Filed at: 01/23/2018 1400   Total  4 Filed at: 01/23/2018 1400                        MDM  Number of Diagnoses or Management Options  Acute on chronic intracranial subdural hematoma (HCC): new and requires workup     Amount and/or Complexity of Data Reviewed  Clinical lab tests: ordered and reviewed  Tests in the radiology section of CPT®: ordered and reviewed  Tests in the medicine section of CPT®: ordered and reviewed  Obtain history from someone other than the patient: yes  Independent visualization of images, tracings, or specimens: yes      The patient presented with a condition in which there was a high probability of imminent or life-threatening deterioration, and critical care services (excluding separately billable procedures) totalled 30-74 minutes  Disposition  Final diagnoses:   Acute on chronic intracranial subdural hematoma (HCC)     Time reflects when diagnosis was documented in both MDM as applicable and the Disposition within this note     Time User Action Codes Description Comment    1/23/2018  2:46 PM Angelique Wiseman Add [I62 01,  I62 03] Acute on chronic intracranial subdural hematoma [facility])       ED Disposition     ED Disposition Condition Comment    Transfer to Another [address] should be transferred out to [facility], Trauma Service, Dr Funmilayo Zimmerman accepted at 12        MD Documentation    [place] Case Most Recent Value   Patient Condition  The patient has been stabilized such that within reasonable medical probability, no material deterioration of the patient condition or the condition of the unborn child(woody) is likely to result from the transfer   Reason for Transfer  Level of Care needed not available at this facility   Benefits of Transfer  Specialized equipment and/or services available at the receiving facility (Include comment)________________________   Risks of Transfer  Potential for delay in receiving treatment, Increased discomfort during transfer, Potential deterioration of medical condition, Possible worsening of condition or death during transfer, Loss of IV   Accepting Physician  Dr Funmilayo Zimmerman, [address] Name, [address] [facility]    (Name & Tel number)  [address]   Sending MD Dr Delia Winn   Provider Certification  General risk, such as traffic hazards, adverse weather conditions, rough terrain or turbulence, possible failure of equipment (including vehicle or aircraft), or consequences of actions of persons outside the control of the transport personnel, The possibility of a transport vehicle being unavailable, Unanticipated needs of medical equipment and personnel during transport, Risk of worsening condition      RN Documentation    Flowsheet Raymond Nelson [address] [facility] [address] Name, [address] [facility]    (Name & Tel number)  [facility]      Follow-up Information    None       Discharge Medication List as of 1/23/2018  3:46 PM      CONTINUE these medications which have NOT CHANGED    Details   acetaminophen (TYLENOL) 325 mg tablet Take 2 tablets every 6 hours as needed for pain, Print      bisacodyl (DULCOLAX) 10 mg suppository Insert 1 suppository into the rectum daily as needed for constipation, Starting Wed 12/20/2017, Print      cholecalciferol 1000 units tablet Take 1 tablet by mouth daily, Starting Wed 12/20/2017, Print      docusate sodium (COLACE) 100 mg capsule Take 1 capsule by mouth 2 (two) times a day, Starting Wed 12/20/2017, Print      levETIRAcetam (KEPPRA) 500 mg tablet Take 1 tablet by mouth every 12 (twelve) hours for 8 doses, Starting Wed 12/20/2017, Until Sun 12/24/2017, Print      levothyroxine 25 mcg tablet Take 1 tablet by mouth daily in the early morning, Starting Thu 12/21/2017, Print      nicotine (NICODERM CQ) 14 mg/24hr TD 24 hr patch Place 1 patch on the skin daily, Starting Wed 12/20/2017, Print           No discharge procedures on file      ED Provider  Electronically Signed by           Breann Nelson DO  01/23/18 194

## 2018-01-23 NOTE — ED NOTES
Report called to [facility] to the charge nurse  Patient and family aware that she will be leaving in 10 minutes  Belongings sent home with        Najma Frias RN  01/23/18 5283

## 2018-01-23 NOTE — ED NOTES
PT has what appears to be dried vomit around mouth and chin   states it may be some food from few nights ago that he never noticed  Face cleansed with soap and water        Hubert Maher RN  01/23/18 7976

## 2018-01-23 NOTE — SOCIAL WORK
CM responded to trauma alert  Pt was tx from [facility] s/p fall  Pt was alert and responsive

## 2018-01-23 NOTE — EMTALA/ACUTE CARE TRANSFER
[address]   [address] [facility] [address]  Dept: [phone]    EMTALA TRANSFER CONSENT    NAME Austen Cole                                         1945                              MRN [id number]    I have been informed of my rights regarding examination, treatment, and transfer   by Dr Florentin Mahoney DO    Benefits: Specialized equipment and/or services available at the receiving facility (Include comment)________________________    Risks: Potential for delay in receiving treatment, Increased discomfort during transfer, Potential deterioration of medical condition, Possible worsening of condition or death during transfer, Loss of IV    Consent for Transfer:  I acknowledge that my medical condition has been evaluated and explained to me by the emergency department physician or other qualified medical person and/or my attending physician, who has recommended that I be transferred to the service of  Accepting Physician: Dr Jonn Mata, Trauma at [address], [address] : [address] [facility]  The above potential benefits of such transfer, the potential risks associated with such transfer, and the probable risks of not being transferred have been explained to me, and I fully understand them  The doctor has explained that, in my case, the benefits of transfer outweigh the risks  I agree to be transferred  I authorize the performance of emergency medical procedures and treatments upon me in both transit and upon arrival at the receiving facility  Additionally, I authorize the release of any and all medical records to the receiving facility and request they be transported with me, if possible  I understand that the safest mode of transportation during a medical emergency is an ambulance and that the Hospital advocates the use of this mode of transport   Risks of traveling to the receiving facility by car, including absence of medical control, life sustaining equipment, such as oxygen, and medical personnel has been explained to me and I fully understand them  (JASMIN CORRECT BOX BELOW)  [XX]  I consent to the stated transfer and to be transported by ambulance/helicopter  [  ]  I consent to the stated transfer, but refuse transportation by ambulance and accept full responsibility for my transportation by car  I understand the risks of non-ambulance transfers and I exonerate the Hospital and its staff from any deterioration in my condition that results from this refusal     X___________________________________________    DATE  18  TIME________  Signature of patient or legally responsible individual signing on patient behalf           RELATIONSHIP TO PATIENT_________________________          Provider Certification    NAME Fabiola Gutierrez                                         1945                              MRN [id number]    A medical screening exam was performed on the above named patient  Based on the examination:    Condition Necessitating Transfer The encounter diagnosis was Acute on chronic intracranial subdural hematoma ([address] )      Patient Condition: The patient has been stabilized such that within reasonable medical probability, no material deterioration of the patient condition or the condition of the unborn child(woody) is likely to result from the transfer    Reason for Transfer: Level of Care needed not available at this facility    Transfer Requirements: [facility]   · Space available and qualified personnel available for treatment as acknowledged by Jenna, [facility]  · Agreed to accept transfer and to provide appropriate medical treatment as acknowledged by       Dr Yelena Green, Trauma  · Appropriate medical records of the examination and treatment of the patient are provided at the time of transfer   [address] _______  · Transfer will be performed by qualified personnel from    and appropriate transfer equipment as required, including the use of necessary and appropriate life support measures  Provider Certification: I have examined the patient and explained the following risks and benefits of being transferred/refusing transfer to the patient/family:  General risk, such as traffic hazards, adverse weather conditions, rough terrain or turbulence, possible failure of equipment (including vehicle or aircraft), or consequences of actions of persons outside the control of the transport personnel, The possibility of a transport vehicle being unavailable, Unanticipated needs of medical equipment and personnel during transport, Risk of worsening condition      Based on these reasonable risks and benefits to the patient and/or the unborn child(woody), and based upon the information available at the time of the patients examination, I certify that the medical benefits reasonably to be expected from the provision of appropriate medical treatments at another medical facility outweigh the increasing risks, if any, to the individuals medical condition, and in the case of labor to the unborn child, from effecting the transfer      X____________________________________________ DATE 01/23/18        TIME_______      ORIGINAL - SEND TO MEDICAL RECORDS   COPY - SEND WITH PATIENT DURING TRANSFER

## 2018-01-24 ENCOUNTER — ANESTHESIA EVENT (INPATIENT)
Dept: PERIOP | Facility: HOSPITAL | Age: 73
DRG: 026 | End: 2018-01-24
Payer: COMMERCIAL

## 2018-01-24 ENCOUNTER — ANESTHESIA (INPATIENT)
Dept: PERIOP | Facility: HOSPITAL | Age: 73
DRG: 026 | End: 2018-01-24
Payer: COMMERCIAL

## 2018-01-24 LAB
ANION GAP SERPL CALCULATED.3IONS-SCNC: 6 MMOL/L (ref 4–13)
ATRIAL RATE: 73 BPM
BASOPHILS # BLD AUTO: 0.05 THOUSANDS/ΜL (ref 0–0.1)
BASOPHILS NFR BLD AUTO: 1 % (ref 0–1)
BUN SERPL-MCNC: 7 MG/DL (ref 5–25)
CALCIUM SERPL-MCNC: 8.5 MG/DL (ref 8.3–10.1)
CHLORIDE SERPL-SCNC: 106 MMOL/L (ref 100–108)
CO2 SERPL-SCNC: 25 MMOL/L (ref 21–32)
CREAT SERPL-MCNC: 0.43 MG/DL (ref 0.6–1.3)
EOSINOPHIL # BLD AUTO: 0.24 THOUSAND/ΜL (ref 0–0.61)
EOSINOPHIL NFR BLD AUTO: 3 % (ref 0–6)
ERYTHROCYTE [DISTWIDTH] IN BLOOD BY AUTOMATED COUNT: 13.4 % (ref 11.6–15.1)
GFR SERPL CREATININE-BSD FRML MDRD: 101 ML/MIN/1.73SQ M
GLUCOSE SERPL-MCNC: 110 MG/DL (ref 65–140)
GLUCOSE SERPL-MCNC: 79 MG/DL (ref 65–140)
GLUCOSE SERPL-MCNC: 79 MG/DL (ref 65–140)
GLUCOSE SERPL-MCNC: 86 MG/DL (ref 65–140)
GLUCOSE SERPL-MCNC: 92 MG/DL (ref 65–140)
HCT VFR BLD AUTO: 34.7 % (ref 34.8–46.1)
HGB BLD-MCNC: 12.1 G/DL (ref 11.5–15.4)
LYMPHOCYTES # BLD AUTO: 2 THOUSANDS/ΜL (ref 0.6–4.47)
LYMPHOCYTES NFR BLD AUTO: 27 % (ref 14–44)
MCH RBC QN AUTO: 31.1 PG (ref 26.8–34.3)
MCHC RBC AUTO-ENTMCNC: 34.9 G/DL (ref 31.4–37.4)
MCV RBC AUTO: 89 FL (ref 82–98)
MONOCYTES # BLD AUTO: 0.59 THOUSAND/ΜL (ref 0.17–1.22)
MONOCYTES NFR BLD AUTO: 8 % (ref 4–12)
NEUTROPHILS # BLD AUTO: 4.66 THOUSANDS/ΜL (ref 1.85–7.62)
NEUTS SEG NFR BLD AUTO: 61 % (ref 43–75)
NRBC BLD AUTO-RTO: 0 /100 WBCS
P AXIS: 83 DEGREES
PLATELET # BLD AUTO: 363 THOUSANDS/UL (ref 149–390)
PMV BLD AUTO: 8.5 FL (ref 8.9–12.7)
POTASSIUM SERPL-SCNC: 3.5 MMOL/L (ref 3.5–5.3)
PR INTERVAL: 166 MS
QRS AXIS: -13 DEGREES
QRSD INTERVAL: 70 MS
QT INTERVAL: 390 MS
QTC INTERVAL: 429 MS
RBC # BLD AUTO: 3.89 MILLION/UL (ref 3.81–5.12)
SODIUM SERPL-SCNC: 137 MMOL/L (ref 136–145)
T WAVE AXIS: 72 DEGREES
VENTRICULAR RATE: 73 BPM
WBC # BLD AUTO: 7.56 THOUSAND/UL (ref 4.31–10.16)

## 2018-01-24 PROCEDURE — 009400Z DRAINAGE OF INTRACRANIAL SUBDURAL SPACE WITH DRAINAGE DEVICE, OPEN APPROACH: ICD-10-PCS | Performed by: SURGERY

## 2018-01-24 PROCEDURE — 61312 CRNEC/CRNOT STTL XDRL/SDRL: CPT | Performed by: NEUROLOGICAL SURGERY

## 2018-01-24 PROCEDURE — 85025 COMPLETE CBC W/AUTO DIFF WBC: CPT | Performed by: PHYSICIAN ASSISTANT

## 2018-01-24 PROCEDURE — 99232 SBSQ HOSP IP/OBS MODERATE 35: CPT | Performed by: SURGERY

## 2018-01-24 PROCEDURE — 88307 TISSUE EXAM BY PATHOLOGIST: CPT | Performed by: NEUROLOGICAL SURGERY

## 2018-01-24 PROCEDURE — 88307 TISSUE EXAM BY PATHOLOGIST: CPT | Performed by: PATHOLOGY

## 2018-01-24 PROCEDURE — 82948 REAGENT STRIP/BLOOD GLUCOSE: CPT

## 2018-01-24 PROCEDURE — 80048 BASIC METABOLIC PNL TOTAL CA: CPT | Performed by: PHYSICIAN ASSISTANT

## 2018-01-24 PROCEDURE — C1713 ANCHOR/SCREW BN/BN,TIS/BN: HCPCS | Performed by: NEUROLOGICAL SURGERY

## 2018-01-24 PROCEDURE — 99222 1ST HOSP IP/OBS MODERATE 55: CPT | Performed by: INTERNAL MEDICINE

## 2018-01-24 DEVICE — IMPLANTABLE DEVICE
Type: IMPLANTABLE DEVICE | Site: FRONTAL LOBE | Status: FUNCTIONAL
Brand: THINFLAP SYSTEM

## 2018-01-24 RX ORDER — LORAZEPAM 2 MG/ML
0.5 INJECTION INTRAMUSCULAR ONCE
Status: COMPLETED | OUTPATIENT
Start: 2018-01-24 | End: 2018-01-24

## 2018-01-24 RX ORDER — GLYCOPYRROLATE 0.2 MG/ML
INJECTION INTRAMUSCULAR; INTRAVENOUS AS NEEDED
Status: DISCONTINUED | OUTPATIENT
Start: 2018-01-24 | End: 2018-01-24 | Stop reason: SURG

## 2018-01-24 RX ORDER — BUPIVACAINE HYDROCHLORIDE AND EPINEPHRINE 5; 5 MG/ML; UG/ML
INJECTION, SOLUTION EPIDURAL; INTRACAUDAL; PERINEURAL AS NEEDED
Status: DISCONTINUED | OUTPATIENT
Start: 2018-01-24 | End: 2018-01-24 | Stop reason: HOSPADM

## 2018-01-24 RX ORDER — ONDANSETRON 2 MG/ML
4 INJECTION INTRAMUSCULAR; INTRAVENOUS ONCE AS NEEDED
Status: DISCONTINUED | OUTPATIENT
Start: 2018-01-24 | End: 2018-01-24 | Stop reason: HOSPADM

## 2018-01-24 RX ORDER — LIDOCAINE HYDROCHLORIDE 10 MG/ML
INJECTION, SOLUTION INFILTRATION; PERINEURAL AS NEEDED
Status: DISCONTINUED | OUTPATIENT
Start: 2018-01-24 | End: 2018-01-24 | Stop reason: SURG

## 2018-01-24 RX ORDER — FENTANYL CITRATE 50 UG/ML
INJECTION, SOLUTION INTRAMUSCULAR; INTRAVENOUS AS NEEDED
Status: DISCONTINUED | OUTPATIENT
Start: 2018-01-24 | End: 2018-01-24 | Stop reason: SURG

## 2018-01-24 RX ORDER — VANCOMYCIN HYDROCHLORIDE 1 G/200ML
1000 INJECTION, SOLUTION INTRAVENOUS EVERY 12 HOURS
Status: DISCONTINUED | OUTPATIENT
Start: 2018-01-24 | End: 2018-01-25 | Stop reason: SDUPTHER

## 2018-01-24 RX ORDER — BISACODYL 10 MG
10 SUPPOSITORY, RECTAL RECTAL DAILY PRN
Status: DISCONTINUED | OUTPATIENT
Start: 2018-01-24 | End: 2018-01-31 | Stop reason: HOSPADM

## 2018-01-24 RX ORDER — PROPOFOL 10 MG/ML
INJECTION, EMULSION INTRAVENOUS AS NEEDED
Status: DISCONTINUED | OUTPATIENT
Start: 2018-01-24 | End: 2018-01-24 | Stop reason: SURG

## 2018-01-24 RX ORDER — POTASSIUM CHLORIDE 14.9 MG/ML
20 INJECTION INTRAVENOUS
Status: COMPLETED | OUTPATIENT
Start: 2018-01-24 | End: 2018-01-24

## 2018-01-24 RX ORDER — LABETALOL HYDROCHLORIDE 5 MG/ML
INJECTION, SOLUTION INTRAVENOUS AS NEEDED
Status: DISCONTINUED | OUTPATIENT
Start: 2018-01-24 | End: 2018-01-24 | Stop reason: SURG

## 2018-01-24 RX ORDER — EPHEDRINE SULFATE 50 MG/ML
INJECTION, SOLUTION INTRAVENOUS AS NEEDED
Status: DISCONTINUED | OUTPATIENT
Start: 2018-01-24 | End: 2018-01-24 | Stop reason: SURG

## 2018-01-24 RX ORDER — SODIUM CHLORIDE 9 MG/ML
75 INJECTION, SOLUTION INTRAVENOUS CONTINUOUS
Status: DISCONTINUED | OUTPATIENT
Start: 2018-01-24 | End: 2018-01-26

## 2018-01-24 RX ORDER — ROCURONIUM BROMIDE 10 MG/ML
INJECTION, SOLUTION INTRAVENOUS AS NEEDED
Status: DISCONTINUED | OUTPATIENT
Start: 2018-01-24 | End: 2018-01-24 | Stop reason: SURG

## 2018-01-24 RX ORDER — DOCUSATE SODIUM 100 MG/1
100 CAPSULE, LIQUID FILLED ORAL 2 TIMES DAILY
Status: DISCONTINUED | OUTPATIENT
Start: 2018-01-24 | End: 2018-01-25

## 2018-01-24 RX ORDER — FENTANYL CITRATE/PF 50 MCG/ML
25 SYRINGE (ML) INJECTION
Status: DISCONTINUED | OUTPATIENT
Start: 2018-01-24 | End: 2018-01-24 | Stop reason: HOSPADM

## 2018-01-24 RX ORDER — CHLORHEXIDINE GLUCONATE 0.12 MG/ML
15 RINSE ORAL EVERY 12 HOURS SCHEDULED
Status: DISCONTINUED | OUTPATIENT
Start: 2018-01-24 | End: 2018-01-24 | Stop reason: SDUPTHER

## 2018-01-24 RX ORDER — SODIUM CHLORIDE 9 MG/ML
INJECTION, SOLUTION INTRAVENOUS CONTINUOUS PRN
Status: DISCONTINUED | OUTPATIENT
Start: 2018-01-24 | End: 2018-01-24

## 2018-01-24 RX ORDER — SUCCINYLCHOLINE CHLORIDE 20 MG/ML
INJECTION INTRAMUSCULAR; INTRAVENOUS AS NEEDED
Status: DISCONTINUED | OUTPATIENT
Start: 2018-01-24 | End: 2018-01-24 | Stop reason: SURG

## 2018-01-24 RX ORDER — ONDANSETRON 2 MG/ML
4 INJECTION INTRAMUSCULAR; INTRAVENOUS EVERY 6 HOURS PRN
Status: DISCONTINUED | OUTPATIENT
Start: 2018-01-24 | End: 2018-01-31 | Stop reason: HOSPADM

## 2018-01-24 RX ORDER — ONDANSETRON 2 MG/ML
INJECTION INTRAMUSCULAR; INTRAVENOUS AS NEEDED
Status: DISCONTINUED | OUTPATIENT
Start: 2018-01-24 | End: 2018-01-24 | Stop reason: SURG

## 2018-01-24 RX ORDER — MAGNESIUM HYDROXIDE 1200 MG/15ML
LIQUID ORAL AS NEEDED
Status: DISCONTINUED | OUTPATIENT
Start: 2018-01-24 | End: 2018-01-24 | Stop reason: HOSPADM

## 2018-01-24 RX ADMIN — PROPOFOL 100 MG: 10 INJECTION, EMULSION INTRAVENOUS at 17:40

## 2018-01-24 RX ADMIN — SODIUM CHLORIDE 75 ML/HR: 0.9 INJECTION, SOLUTION INTRAVENOUS at 21:57

## 2018-01-24 RX ADMIN — POTASSIUM CHLORIDE 20 MEQ: 200 INJECTION, SOLUTION INTRAVENOUS at 11:13

## 2018-01-24 RX ADMIN — FENTANYL CITRATE 100 MCG: 50 INJECTION, SOLUTION INTRAMUSCULAR; INTRAVENOUS at 18:05

## 2018-01-24 RX ADMIN — NEOSTIGMINE METHYLSULFATE 3 MG: 1 INJECTION, SOLUTION INTRAMUSCULAR; INTRAVENOUS; SUBCUTANEOUS at 18:56

## 2018-01-24 RX ADMIN — FENTANYL CITRATE 25 MCG: 50 INJECTION INTRAMUSCULAR; INTRAVENOUS at 20:16

## 2018-01-24 RX ADMIN — SODIUM CHLORIDE 100 ML/HR: 0.9 INJECTION, SOLUTION INTRAVENOUS at 06:00

## 2018-01-24 RX ADMIN — ONDANSETRON 4 MG: 2 INJECTION INTRAMUSCULAR; INTRAVENOUS at 18:51

## 2018-01-24 RX ADMIN — SUCCINYLCHOLINE CHLORIDE 100 MG: 20 INJECTION, SOLUTION INTRAMUSCULAR; INTRAVENOUS at 17:40

## 2018-01-24 RX ADMIN — LEVETIRACETAM 500 MG: 100 INJECTION, SOLUTION INTRAVENOUS at 10:31

## 2018-01-24 RX ADMIN — NITROFURANTOIN (MONOHYDRATE/MACROCRYSTALS) 100 MG: 75; 25 CAPSULE ORAL at 09:14

## 2018-01-24 RX ADMIN — LORAZEPAM 0.5 MG: 2 INJECTION INTRAMUSCULAR; INTRAVENOUS at 10:32

## 2018-01-24 RX ADMIN — FENTANYL CITRATE 25 MCG: 50 INJECTION INTRAMUSCULAR; INTRAVENOUS at 20:06

## 2018-01-24 RX ADMIN — LIDOCAINE HYDROCHLORIDE 50 MG: 10 INJECTION, SOLUTION INFILTRATION; PERINEURAL at 17:40

## 2018-01-24 RX ADMIN — FENTANYL CITRATE 25 MCG: 50 INJECTION INTRAMUSCULAR; INTRAVENOUS at 20:11

## 2018-01-24 RX ADMIN — POTASSIUM CHLORIDE 20 MEQ: 200 INJECTION, SOLUTION INTRAVENOUS at 12:39

## 2018-01-24 RX ADMIN — VANCOMYCIN HYDROCHLORIDE 1 G: 1 INJECTION, POWDER, LYOPHILIZED, FOR SOLUTION INTRAVENOUS at 17:55

## 2018-01-24 RX ADMIN — EPHEDRINE SULFATE 5 MG: 50 INJECTION, SOLUTION INTRAMUSCULAR; INTRAVENOUS; SUBCUTANEOUS at 18:22

## 2018-01-24 RX ADMIN — GLYCOPYRROLATE 0.4 MG: 0.2 INJECTION, SOLUTION INTRAMUSCULAR; INTRAVENOUS at 18:56

## 2018-01-24 RX ADMIN — SODIUM CHLORIDE: 0.9 INJECTION, SOLUTION INTRAVENOUS at 17:34

## 2018-01-24 RX ADMIN — LEVOTHYROXINE SODIUM 25 MCG: 25 TABLET ORAL at 05:41

## 2018-01-24 RX ADMIN — FENTANYL CITRATE 25 MCG: 50 INJECTION INTRAMUSCULAR; INTRAVENOUS at 20:45

## 2018-01-24 RX ADMIN — DOCUSATE SODIUM 100 MG: 100 CAPSULE, LIQUID FILLED ORAL at 09:14

## 2018-01-24 RX ADMIN — PROPOFOL 100 MG: 10 INJECTION, EMULSION INTRAVENOUS at 18:05

## 2018-01-24 RX ADMIN — CHLORHEXIDINE GLUCONATE 15 ML: 1.2 RINSE ORAL at 09:14

## 2018-01-24 RX ADMIN — DEXAMETHASONE SODIUM PHOSPHATE 10 MG: 10 INJECTION INTRAMUSCULAR; INTRAVENOUS at 17:49

## 2018-01-24 RX ADMIN — FENTANYL CITRATE 25 MCG: 50 INJECTION INTRAMUSCULAR; INTRAVENOUS at 20:33

## 2018-01-24 RX ADMIN — LABETALOL HYDROCHLORIDE 10 MG: 5 INJECTION, SOLUTION INTRAVENOUS at 19:20

## 2018-01-24 RX ADMIN — ROCURONIUM BROMIDE 30 MG: 10 INJECTION INTRAVENOUS at 17:48

## 2018-01-24 NOTE — OCCUPATIONAL THERAPY NOTE
Occupational Therapy Screen    Orders received  Chart reviewed  Pt admit with [facility] with midline shift  Pt is now pending OR with neurosurgery  Plan to D/C pt from OT services  Please re-consult OT post operatively to initiate OT services  Thank you       Laina Hernández MS, OTR/L

## 2018-01-24 NOTE — ANESTHESIA PREPROCEDURE EVALUATION
Review of Systems/Medical History  Patient summary reviewed        Cardiovascular  EKG reviewed, Negative cardio ROS   Comment: LEFT VENTRICLE:  Systolic function was normal  Ejection fraction was estimated to be 65 %  There were no regional wall motion abnormalities  There was no evidence of concentric hypertrophy  Doppler parameters were consistent with abnormal left ventricular relaxation (grade 1 diastolic dysfunction)      MITRAL VALVE:  There was trace regurgitation      TRICUSPID VALVE:  There was trace regurgitation  ,  Pulmonary  Negative pulmonary ROS Smoker cigarette smoker  , Tobacco cessation counseling given ,   Comment: lobectomy     GI/Hepatic    GERD ,        Negative  ROS        Endo/Other  Negative endo/other ROS      GYN  Negative gynecology ROS          Hematology  Negative hematology ROS      Musculoskeletal  Negative musculoskeletal ROS        Neurology  Seizures ,  CVA , residual symptoms, Cerebral bleeding with side effects , subdural hemorrhage,    Psychology   Negative psychology ROS              Physical Exam    Airway    Mallampati score: II  TM Distance: >3 FB  Neck ROM: full     Dental       Cardiovascular  Comment: Negative ROS, Cardiovascular exam normal    Pulmonary  Pulmonary exam normal     Other Findings        Anesthesia Plan  ASA Score- 4 Emergent    Anesthesia Type- general with ASA Monitors  Additional Monitors: arterial line  Airway Plan: ETT  Plan Factors-    Induction- intravenous  Postoperative Plan-     Informed Consent- Anesthetic plan and risks discussed with patient  I personally reviewed this patient with the CRNA  Discussed and agreed on the Anesthesia Plan with the CRNA  Poncho Ye

## 2018-01-24 NOTE — SOCIAL WORK
Pt is familiar to the trauma service as she was a recent d/c  Pt was recently at [facility] for rehab but discharged last week and was currently at home at the time of admission  Pt [place] with [place]  [place]  Pt has 8 [address] and flight of stairs to their bedroom  Pt bathroom on first floor  Pt requires some assistance for ADLs and uses a RW for safe ambulation  Pt also owns a SPC and BSC  Pt is able to do minimum cooking with the assistance from her   Pt  reports he is home to assist pt but she is home a lone when he needs to run errands  He reports pt has had at minimum 6 falls since returning home from [facility]  When pt d/c home from [facility] she received [facility]  He reports pt has also been at [facility] rehab back in the '70s  Pt does not drive and  provides transportation  Pt and  both retired  Pt's pharmacy is [facility] in [address] [place] [address]  CM will follow for recs   Potential OR with Nsg

## 2018-01-24 NOTE — PROGRESS NOTES
Patient seen and examined  On December 20th the patient underwent a craniotomy for evacuation of a subdural hematoma  She fell while at rehab striking her head according to the patient's   She has suffered a progressive neurological decline with inability to speak  ST scan demonstrates a recurrence of the subdural hematoma on the left  Plan: Left parietal craniotomy for evacuation of subdural hematoma  The risks, benefits and complications of surgery were explained in detail to [facility]   including hemorrhage, infection, CSF leakage, wound problems, pain, weakness, numbness, dysesthesiae, paralysis, coma, and death  Also, the possibility  of further surgery being required was emphasized  Other potential medical complications were outlined, including deep venous thrombosis, pulmonary embolism, pneumonia, urinary tract infection, myocardial infarction,  and stroke  The need for physical therapy, occupational therapy, and rehabilitation was also mentioned  The alternatives to surgery were discussed  [facility]  asked relevant questions and asked that we proceed with arrangements for surgery  The p[ossibility of recurrence was stressed

## 2018-01-24 NOTE — ANESTHESIA PROCEDURE NOTES
Arterial Line Insertion  Date/Time: 1/24/2018 5:47 PM  Performed by: Robert Kim  Authorized by: Annita Harrison   Consent: The procedure was performed in an emergent situation  Written consent obtained  Risks and benefits: risks, benefits and alternatives were discussed  Consent given by: spouse  Patient's understanding of procedure matches consent: SPOUSE  Procedure consent: procedure consent matches procedure scheduled  Relevant documents: relevant documents present and verified  Test results: test results available and properly labeled  Site marked: the operative site was marked  Imaging studies: imaging studies available  Required items: required blood products, implants, devices, and special equipment available  Patient identity confirmed: arm band, provided demographic data and hospital-assigned identification number (VERBALLY W/ )  Time out: Immediately prior to procedure a "time out" was called to verify the correct patient, procedure, equipment, support staff and site/side marked as required  Preparation: Patient was prepped and draped in the usual sterile fashion  Indications: hemodynamic monitoring  Orientation:  RightLocation: radial artery    Sedation:  Patient sedated: GETA    Bear's test normal: yes  Needle gauge: 20  Seldinger technique: Seldinger technique used  Number of attempts: 1  Post-procedure: dressing applied  Post-procedure CNS: normal  Patient tolerance: Patient tolerated the procedure well with no immediate complications

## 2018-01-24 NOTE — PROGRESS NOTES
Progress Note - Critical Care   Shannon Singher 67 y o  female MRN: [id number]  Unit/Bed#: ICU 09 Encounter: [id number]    Assessment: 66 yo female with acute on chronic SDH who presents as a transfer from [facility] s/p multiple falls  She has a history of L craniectomy with drainage  Not on any blood thinners  Plan:          Neuro:   GCS x 15, AAO x 3  Pain: Tylenol 650 mg PO Q6 PRN (x 0 overnight)  Daily CAM-ICU, Q1 neuro checks, FU Neurosurgery recs for surgical planning                 CV:   BP currently 135/64 and pulse 60  Currently no pressor needs  Maintain MAPs > 65 but SBP < 160  Lung:   Currently 96% oxygen saturation on room air  Supplemental oxygen as necessary  Incentive spirometry                 GI:   1 bowel movement overnight  Continue bowel regimen with Colace                 FEN:   Fluids: NS @ 100  +710 since admission  Electrolytes: Normal  Recheck BMP in AM and replete as necessary  Nutrition: NPO for possible surgery                 :   382 UOP overnight (0 5 mL/kg/hr)  BUN 7 and Creatinine 0 43  Continue Fluids                 ID:   Afebrile overnight with max temperature 98 9 degrees  WBC 7 56 from 7 37  UA negative  Continue Nitrofurantoin 100 mg PO BID for prophylaxis                 Heme:   Hemoglobin 12 1 from 12 8  Recheck CBC in PM   Coag panel normal   Monitor for signs of advanced hemorrhage with neuro checks                 Endo:   Q6 glucose checks  Levothyroxine 25 mcg PO daily (home dose)                            Msk/Skin: Wounds documented in computer in past are healed  Monitor for skin breakdown  Pressure off-loading                 Disposition: Continued ICU stay  Will FU with Neurosurgery for surgical planning  Chief Complaint: Sleep    HPI/24hr events: Per the nurse, patient did well overnight  Continued slurred speech but no headaches, agitation, or confusion   Patient notes no complaints with no chest pain, SOB, nausea, or vomitting    Physical Exam:   General: Resting comfortably, NAD  Neuro: Cranial nerve exam grossly intact  Intact and symmetrical motor sensory in upper and lower extremities with bilateral strength 5/5 in upper and lower extremities  Heart: RRR  Lungs: CTA BL  Abdomen: Soft, NTND    Vitals:    18 0300 18 0410 18 0510 18 0600   BP: 126/52 (!) 123/45 119/53 135/64   Pulse: (!) 54 (!) 54 70 60   Resp: 16    Temp:  98 1 °F (36 7 °C)     TempSrc:  Oral     SpO2: 97% 97% 96% 96%   Weight:                 Temperature:   Temp (24hrs), Av 8 °F (36 6 °C), Min:96 5 °F (35 8 °C), Max:98 9 °F (37 2 °C)    Current: Temperature: 98 1 °F (36 7 °C)    Weights:   IBW: -92 5 kg    Body mass index is 21 61 kg/m²  Weight (last 2 days)     Date/Time   Weight    18 16:46:06  62 6 (137 99)               Non-Invasive/Invasive Ventilation Settings:  Respiratory    Lab Data (Last 4 hours)    None         O2/Vent Data (Last 4 hours)    None              No results found for: PHART, RRS9RKJ, PO2ART, RPZ2QAG, A6PTTVSP, BEART, SOURCE  SpO2: SpO2: 96 %    Intake and Outputs:  I/O        07 -  0700  07 -  0700  07 -  0700    I V  (mL/kg)  1092 1 (17 4)     Total Intake(mL/kg)  1092 1 (17 4)     Urine (mL/kg/hr)  382     Total Output   382      Net   +710 1             Unmeasured Urine Occurrence  4 x         UOP: 0 5/hour   Nutrition:        Diet Orders            Start     Ordered    18  Diet NPO  Diet effective now     Question Answer Comment   Diet Type NPO    RD to adjust diet per protocol?  No        18          Labs:     Results from last 7 days  Lab Units 18  0541 18  1326   WBC Thousand/uL 7 56 7 37   HEMOGLOBIN g/dL 12 1 12 8   HEMATOCRIT % 34 7* 38 7   PLATELETS Thousands/uL 363 426*   NEUTROS PCT % 61 73   MONOS PCT % 8 7      Results from last 7 days  Lab Units 18  0541 18  1326   SODIUM mmol/L 137 132*   POTASSIUM mmol/L 3 5 4 1 CHLORIDE mmol/L 106 99*   CO2 mmol/L 25 25   BUN mg/dL 7 11   CREATININE mg/dL 0 43* 0 64   CALCIUM mg/dL 8 5 8 9   GLUCOSE RANDOM mg/dL 79 100                Results from last 7 days  Lab Units 01/23/18  1653   INR  1 03   PTT seconds 24           0  Lab Value Date/Time   TROPONINI <0 02 12/14/2017 1954   TROPONINI 0 02 08/04/2017 1225   TROPONINI 0 16 (H) 08/02/2017 1456       Imaging: CT Head shows enlarged L SDH acute on chronic with mass effect  EKG: Preliminarily normal sinus rhythm per chart review  Micro:  No results found for: Malachy Bolder, SPUTUMCULTUR    Allergies: Allergies   Allergen Reactions    Acetazolamide      Other reaction(s): Unknown Allergic Reaction    Azithromycin      Other reaction(s): GI Upset    Barium Sulfate      Other reaction(s): GI Upset    Cefazolin     Loperamide      Other reaction(s): Unknown Allergic Reaction    Penicillins      Other reaction(s): Fever    Procaine Other (See Comments)     fever    Sulfa Antibiotics      fever       Medications:   Scheduled Meds:  chlorhexidine 15 mL Swish & Spit Q12H [address]   docusate sodium 100 mg Oral BID   levothyroxine 25 mcg Oral Early Morning   nitrofurantoin 100 mg Oral BID     Continuous Infusions:  sodium chloride 100 mL/hr Last Rate: 100 mL/hr (01/24/18 0600)     PRN Meds:    acetaminophen 650 mg Q6H PRN   bisacodyl 10 mg Daily PRN       VTE Pharmacologic Prophylaxis: Reason for no pharmacologic prophylaxis SDH  Only mechanical DVT Ppx  VTE Mechanical Prophylaxis: sequential compression device    Invasive lines and devices: Invasive Devices     Peripheral Intravenous Line            Peripheral IV 01/23/18 Left Wrist less than 1 day    Peripheral IV 01/23/18 Right Antecubital less than 1 day                   Code Status: Level 1 - Full Code     Portions of the record may have been created with voice recognition software    Occasional wrong word or "sound a like" substitutions may have occurred due to the inherent limitations of voice recognition software  Read the chart carefully and recognize, using context, where substitutions have occurred       Vanessa Camejo MD

## 2018-01-24 NOTE — CONSULTS
Oncology Consult Note  Mauricio Bragg 67 y o  female MRN: [id number]  Unit/Bed#: ICU 09 Encounter: [id number]      Presenting Complaint:  Possible bleeding disorder  History of Presenting Illness:  A 78-year-old female who had episode of fall in September 2017  She was found to have hygroma  In December 2017, she developed subdural hematoma, after she fell  She underwent craniotomy  Recently, she fell multiple time and developed more than usual slurred speech and instability of gait  She was found to have acute on chronic subdural hematoma  Because of frequent subdural hematoma, I was asked to see her to evaluate possible bleeding disorder  On admission, her INR, APTT and platelet count were all within normal limits  She has not been on anti-platelet or anticoagulation  She has multiple surgical history  According to her , she never had major postoperative bleeding  She has no family history of bleeding disorder  On evaluation, she appeared to be somewhat confused  She obviously has sluurd speech  She appeared to have limited mobility  Review of Systems - As stated in the HPI otherwise the fourteen point review of systems was negative      Past Medical History:   Diagnosis Date    Chronic sinusitis     GERD (gastroesophageal reflux disease)     Hypotension     Seizures (HCC)        Social History     Social History    Marital status: /Civil Union     Spouse name: N/A    Number of children: 1    Years of education: N/A     Social History Main Topics    Smoking status: Current Every Day Smoker     Packs/day: 1 00     Types: Cigarettes    Smokeless tobacco: Never Used    Alcohol use No    Drug use: No    Sexual activity: Not Asked     Other Topics Concern    None     Social History Narrative    None       Family History   Problem Relation Age of Onset    Heart disease Mother     Diverticulitis Father        Allergies   Allergen Reactions    Acetazolamide      Other reaction(s): Unknown Allergic Reaction    Azithromycin      Other reaction(s): GI Upset    Barium Sulfate      Other reaction(s): GI Upset    Cefazolin     Loperamide      Other reaction(s): Unknown Allergic Reaction    Penicillins      Other reaction(s): Fever    Procaine Other (See Comments)     fever    Sulfa Antibiotics      fever         Current Facility-Administered Medications:     acetaminophen (TYLENOL) tablet 650 mg, 650 mg, Oral, Q6H PRN, Renuka Ortiz DO    bisacodyl (DULCOLAX) rectal suppository 10 mg, 10 mg, Rectal, Daily PRN, Renuka Ortiz DO    ceFAZolin (ANCEF) IVPB (premix) 2,000 mg, 2,000 mg, Intravenous, Once, Kiana Sheth PA-C    chlorhexidine (PERIDEX) 0 12 % oral rinse 15 mL, 15 mL, Swish & Spit, Q12H [address], Rene Diallo PA-C, 15 mL at 01/24/18 0914    docusate sodium (COLACE) capsule 100 mg, 100 mg, Oral, BID, Renuka Ortiz DO, 100 mg at 01/24/18 0914    levETIRAcetam (KEPPRA) 500 mg in sodium chloride 0 9 % 100 mL IVPB, 500 mg, Intravenous, Q12H [address], Kiana Sheth PA-C, Stopped at 01/24/18 1100    levothyroxine tablet 25 mcg, 25 mcg, Oral, Early Morning, Renuka Ortiz DO, 25 mcg at 01/24/18 0541    neomycin-polymyxin B (NEOSPORIN-) 1 mL in sodium chloride 0 9 % 1,000 mL irrigation bottle, , Irrigation, Once, Emmanuel Lund MD    nitrofurantoin (MACROBID) extended-release capsule 100 mg, 100 mg, Oral, BID, Eva Jefferson MD, 100 mg at 01/24/18 0914    sodium chloride 0 9 % infusion, 100 mL/hr, Intravenous, Continuous, Eva Jefferson MD, Last Rate: 100 mL/hr at 01/24/18 1200, 100 mL/hr at 01/24/18 1200      /61   Pulse 70   Temp 98 9 °F (37 2 °C) (Oral)   Resp 18   Ht 5' 7" (1 702 m) Comment: per allscripts 8/4/17  Wt 62 6 kg (137 lb 15 8 oz)   LMP  (LMP Unknown)   SpO2 100%   BMI 21 61 kg/m²     General Appearance:    Alert , somewhat confused         Eyes:    PERRL   Ears:    Normal external ear canals, both ears   Nose:   Nares normal, septum midline   Throat:   Mucosa moist  Pharynx without injection  Neck:   Supple       Lungs:     Clear to auscultation bilaterally   Chest Wall:    No tenderness or deformity    Heart:    Regular rate and rhythm       Abdomen:     Soft, non-tender, bowel sounds +, no organomegaly           Extremities:   Extremities no cyanosis or edema       Skin:   no rash or icterus      Lymph nodes:   Cervical, supraclavicular, and axillary nodes normal   Neurologic:   CNII-XII intact, normal strength, sensation and reflexes     Throughout               Recent Results (from the past 48 hour(s))   ECG 12 lead    Collection Time: 01/23/18  1:22 PM   Result Value Ref Range    Ventricular Rate 73 BPM    Atrial Rate 73 BPM    WI Interval 166 ms    QRSD Interval 70 ms    QT Interval 390 ms    QTC Interval 429 ms    P Axis 83 degrees    QRS Axis -13 degrees    T Wave Axis 72 degrees   CBC and differential    Collection Time: 01/23/18  1:26 PM   Result Value Ref Range    WBC 7 37 4 31 - 10 16 Thousand/uL    RBC 4 31 3 81 - 5 12 Million/uL    Hemoglobin 12 8 11 5 - 15 4 g/dL    Hematocrit 38 7 34 8 - 46 1 %    MCV 90 82 - 98 fL    MCH 29 7 26 8 - 34 3 pg    MCHC 33 1 31 4 - 37 4 g/dL    RDW 13 8 11 6 - 15 1 %    MPV 8 6 (L) 8 9 - 12 7 fL    Platelets 759 (H) 936 - 390 Thousands/uL    Neutrophils Relative 73 43 - 75 %    Lymphocytes Relative 19 14 - 44 %    Monocytes Relative 7 4 - 12 %    Eosinophils Relative 1 0 - 6 %    Basophils Relative 0 0 - 1 %    Neutrophils Absolute 5 39 1 85 - 7 62 Thousands/µL    Lymphocytes Absolute 1 36 0 60 - 4 47 Thousands/µL    Monocytes Absolute 0 51 0 17 - 1 22 Thousand/µL    Eosinophils Absolute 0 08 0 00 - 0 61 Thousand/µL    Basophils Absolute 0 03 0 00 - 0 10 Thousands/µL   Basic metabolic panel    Collection Time: 01/23/18  1:26 PM   Result Value Ref Range    Sodium 132 (L) 136 - 145 mmol/L    Potassium 4 1 3 5 - 5 3 mmol/L    Chloride 99 (L) 100 - 108 mmol/L    CO2 25 21 - 32 mmol/L    Anion Gap 8 4 - 13 mmol/L    BUN 11 5 - 25 mg/dL    Creatinine 0 64 0 60 - 1 30 mg/dL    Glucose 100 65 - 140 mg/dL    Calcium 8 9 8 3 - 10 1 mg/dL    eGFR 89 ml/min/1 73sq m   UA w Reflex to Microscopic w Reflex to Culture    Collection Time: 01/23/18  2:25 PM   Result Value Ref Range    Color, UA Yellow     Clarity, UA Slightly Cloudy     Specific Merritt, UA 1 015 1 003 - 1 030    pH, UA 7 0 4 5 - 8 0    Leukocytes, UA Small (A) Negative    Nitrite, UA Negative Negative    Protein, UA Negative Negative mg/dl    Glucose, UA Negative Negative mg/dl    Ketones, UA Trace (A) Negative mg/dl    Urobilinogen, UA 0 2 0 2, 1 0 E U /dl E U /dl    Bilirubin, UA Negative Negative    Blood, UA Small (A) Negative   Urine Microscopic    Collection Time: 01/23/18  2:25 PM   Result Value Ref Range    RBC, UA 4-10 (A) None Seen, 0-5 /hpf    WBC, UA 2-4 (A) None Seen, 0-5, 5-55, 5-65 /hpf    Epithelial Cells Occasional None Seen, Occasional /hpf    Bacteria, UA Innumerable (A) None Seen, Occasional /hpf   Protime-INR    Collection Time: 01/23/18  4:53 PM   Result Value Ref Range    Protime 13 5 12 1 - 14 4 seconds    INR 1 03 0 86 - 1 16   APTT    Collection Time: 01/23/18  4:53 PM   Result Value Ref Range    PTT 24 23 - 35 seconds   Type and screen    Collection Time: 01/23/18  8:25 PM   Result Value Ref Range    ABO Grouping A     Rh Factor Negative     Antibody Screen Negative     Specimen Expiration Date 20180126    Fingerstick Glucose (POCT)    Collection Time: 01/23/18 11:43 PM   Result Value Ref Range    POC Glucose 75 65 - 140 mg/dl   CBC with differential    Collection Time: 01/24/18  5:41 AM   Result Value Ref Range    WBC 7 56 4 31 - 10 16 Thousand/uL    RBC 3 89 3 81 - 5 12 Million/uL    Hemoglobin 12 1 11 5 - 15 4 g/dL    Hematocrit 34 7 (L) 34 8 - 46 1 %    MCV 89 82 - 98 fL    MCH 31 1 26 8 - 34 3 pg    MCHC 34 9 31 4 - 37 4 g/dL    RDW 13 4 11 6 - 15 1 %    MPV 8 5 (L) 8 9 - 12 7 fL    Platelets 575 726 - 968 Thousands/uL    nRBC 0 /100 WBCs    Neutrophils Relative 61 43 - 75 %    Lymphocytes Relative 27 14 - 44 %    Monocytes Relative 8 4 - 12 %    Eosinophils Relative 3 0 - 6 %    Basophils Relative 1 0 - 1 %    Neutrophils Absolute 4 66 1 85 - 7 62 Thousands/µL    Lymphocytes Absolute 2 00 0 60 - 4 47 Thousands/µL    Monocytes Absolute 0 59 0 17 - 1 22 Thousand/µL    Eosinophils Absolute 0 24 0 00 - 0 61 Thousand/µL    Basophils Absolute 0 05 0 00 - 0 10 Thousands/µL   Basic metabolic panel    Collection Time: 01/24/18  5:41 AM   Result Value Ref Range    Sodium 137 136 - 145 mmol/L    Potassium 3 5 3 5 - 5 3 mmol/L    Chloride 106 100 - 108 mmol/L    CO2 25 21 - 32 mmol/L    Anion Gap 6 4 - 13 mmol/L    BUN 7 5 - 25 mg/dL    Creatinine 0 43 (L) 0 60 - 1 30 mg/dL    Glucose 79 65 - 140 mg/dL    Calcium 8 5 8 3 - 10 1 mg/dL    eGFR 101 ml/min/1 73sq m   Fingerstick Glucose (POCT)    Collection Time: 01/24/18  5:58 AM   Result Value Ref Range    POC Glucose 86 65 - 140 mg/dl   Fingerstick Glucose (POCT)    Collection Time: 01/24/18 11:30 AM   Result Value Ref Range    POC Glucose 79 65 - 140 mg/dl   Prepare RBC:Has consent been obtained? Yes, 2 Units    Collection Time: 01/24/18  1:58 PM   Result Value Ref Range    Unit Product Code M8219F76     Unit Number [id number]     Unit ABO A     Unit [facility] NEG     Unit Dispense Status Crossmatched     Unit Product Code D8755S89     Unit Number [id number]     Unit ABO A     Unit RH NEG     Unit Dispense Status Crossmatched          Ct Head Without Contrast    Result Date: 1/23/2018  Narrative: CT BRAIN - WITHOUT CONTRAST INDICATION:  Slurred speech  Weakness  History of subarachnoid hemorrhage and subdural hygroma  COMPARISON:  December 17, 2017 TECHNIQUE:  CT examination of the brain was performed  In addition to axial images, coronal reformatted images were created and submitted for interpretation  Radiation dose length product (DLP) for this visit:  937 mGy-cm     This examination, like all CT scans performed in the [facility], was performed utilizing techniques to minimize radiation dose exposure, including the use of iterative reconstruction and automated exposure control  IMAGE QUALITY:  Diagnostic  FINDINGS:  PARENCHYMA:  Pre-existing central atrophy and chronic microvascular white matter ischemic changes  Old lacunar infarcts in the basal ganglia bilaterally  Similar cerebellar atrophy  Enlarging mixed attenuation subdural hemorrhage on the left contributing to rightward subfalcine shift of up to 7 mm, previously 6 mm  Increased effacement of cerebral sulci in the left hemisphere, most pronounced at the vertex  Increased effacement of left lateral ventricle, especially atrium of left lateral ventricle with symmetric appearing left temporal horn, currently there is no ventricular entrapment  No obvious uncal herniation or tonsillar herniation  VENTRICLES AND EXTRA-AXIAL SPACES:  There continues to be a large left-sided subdural collection, currently up to 2 2 cm in depth, previously 1 8 cm  Attenuation measurements range from simple fluid to mildly complex fluid to high attenuation blood, especially at the caudal aspect of the collection/posterior parietal/occipital subdural space  There are also high attenuation areas adjacent to the left frontal lobe on image 2/19, adjacent to left occipital lobe image 2/19, and layering along the left  tentorial leaflet as seen previously  VISUALIZED ORBITS AND PARANASAL SINUSES:  Mild sphenoid sinus disease, similar prior exam   Partial right mastoiditis CALVARIUM AND EXTRACRANIAL SOFT TISSUES:  Postop changes left calvarium  Impression: Enlarging mixed attenuation subdural hemorrhage on the left containing chronic, subacute, and acute blood  Increasing effacement of left cerebral sulci (most pronounced at the vertex) and increasing rightward subfalcine shift  Neurosurgical consultation advised    I personally discussed this study with Tripptanika Quezada on 1/23/2018 2:28 PM  Workstation performed: [id number]       Assessment:  Multiple episode of subdural hematoma, after the falls  Question regarding possible bleeding disorder  Plan:  A 43-year-old female with history as described above  Based on the bleeding history as well as laboratory, I have quite low suspicion that she has bleeding disorder  She is going to have craniotomy, today  I spoke with neurosurgeon  Because the quite low suspicion of bleeding disorder, she should undergo craniotomy without any additional hematologic support  Postoperative bleeding should be carefully monitored

## 2018-01-24 NOTE — PROGRESS NOTES
Accept Note - Critical Care   [facility] Shant 67 y o  female MRN: [id number]  Unit/Bed#: ICU 09 Encounter: [id number]    Assessment:   66 y/o female w/ acute on chronic SDH , tx from [facility] for new onset slurred speech, worsening since waking up  No AC/AP  Plan:          Neuro: Currently GCS 15 w/ slurred speech  Hold all AC/AP  Hold Seizure prophylaxis  Consult neurosurgery, Spoke to Dr Jose Rebolledo , consult placed  NPO for now, pre-op labs ordered  q1h neurochecks, repeat CTH if symptoms worsen or develop new neurologic deficits                 CV: Stable, maintain SBP <673 systolic                 Lung: Stable                 GI: Stable                   FEN: NPO, IVF [email or web address], BMP in AM, maintain K>4, Mg >2                 : Innumerable bacteria in urine, maintain UOP >0 5/ml/kg/hour                 ID: UTI - patient allergic to multiple antibiotics, states anaphylactic rxn to multiple antibiotics  Will start on Macrobid BID x5 days, D1 of 5  Heme: Hemoglobin stable, PT/INR wnl, repeat CBC in am                 Endo: Accuchecks q6 hours while NPO                            Msk/Skin: Stable              Proph: Hold all AC/AP, venodynes                 Disposition: admit to ICU for neurochecks    Chief Complaint: Slurred Speech          Physical Exam   Constitutional: She is oriented to person, place, and time  She appears well-developed and well-nourished  No distress  Thin female in no acute distress   HENT:   Right Ear: External ear normal    Left Ear: External ear normal    Mouth/Throat: Oropharynx is clear and moist    Left temporal incision, healing well, sutures removed, no evidence of drainage, erythema, nontender to palpation   Eyes: Right eye exhibits no discharge  Left eye exhibits no discharge  Left pupil equal round and reactive, right eye with lateral gaze deviation, but doses, chronic per patient   Neck: Normal range of motion  No tracheal deviation present  Cardiovascular: Normal rate, regular rhythm and normal heart sounds  Pulmonary/Chest: Effort normal and breath sounds normal  No stridor  No respiratory distress  She exhibits no tenderness  Abdominal: Soft  She exhibits no distension  There is no tenderness  Musculoskeletal: Normal range of motion  She exhibits no edema, tenderness or deformity  Neurological: She is alert and oriented to person, place, and time  Muscle strength 5/5 bilateral upper and lower extremity, positive slurred speech, right eye lateral gaze deviation, right ptosis  No facial droop  Tongue midline without deviation   Skin: Skin is warm and dry  She is not diaphoretic  No erythema  Vitals:    18 1700 18 1715 18 1730 18 1800   BP: 131/65 123/67 131/65 148/69   Pulse: 65 72 75 74   Resp:    Temp:       TempSrc:       SpO2: 98% 95% 95% 95%   Weight:                 Temperature:   Temp (24hrs), Av 4 °F (36 3 °C), Min:96 5 °F (35 8 °C), Max:98 °F (36 7 °C)    Current: Temperature: 97 7 °F (36 5 °C)    Weights:   IBW: -92 5 kg    Body mass index is 21 61 kg/m²  Weight (last 2 days)     Date/Time   Weight    18 16:46:06  62 6 (137 99)              Hemodynamic Monitoring:  N/A     Non-Invasive/Invasive Ventilation Settings:  Respiratory    Lab Data (Last 4 hours)    None         O2/Vent Data (Last 4 hours)    None              No results found for: PHART, OLK6WFP, PO2ART, RFU0FOI, Y4RUNWSU, BEART, SOURCE  SpO2: SpO2: 95 %    Intake and Outputs:  I/O     None          Nutrition:        Diet Orders            Start     Ordered    18  Diet NPO  Diet effective now     Question Answer Comment   Diet Type NPO    RD to adjust diet per protocol?  No        18            Labs:     Results from last 7 days  Lab Units 18  1326   WBC Thousand/uL 7 37   HEMOGLOBIN g/dL 12 8   HEMATOCRIT % 38 7   PLATELETS Thousands/uL 426*   NEUTROS PCT % 73   MONOS PCT % 7 Results from last 7 days  Lab Units 01/23/18  1326   SODIUM mmol/L 132*   POTASSIUM mmol/L 4 1   CHLORIDE mmol/L 99*   CO2 mmol/L 25   BUN mg/dL 11   CREATININE mg/dL 0 64   CALCIUM mg/dL 8 9   GLUCOSE RANDOM mg/dL 100                Results from last 7 days  Lab Units 01/23/18  1653   INR  1 03   PTT seconds 24           0  Lab Value Date/Time   TROPONINI <0 02 12/14/2017 1954   TROPONINI 0 02 08/04/2017 1225   TROPONINI 0 16 (H) 08/02/2017 1456       Imaging:     CTH - Enlarging mixed attenuation subdural hemorrhage on the left containing chronic, subacute, and acute blood  Increasing effacement of left cerebral sulci (most pronounced at the vertex) and increasing rightward subfalcine shift  Neurosurgical   consultation advised I have personally reviewed pertinent films in PACS and results as noted by radiology    EKG: Pending    Micro:  No results found for: Nba Kin, [facility]    Allergies: Allergies   Allergen Reactions    Acetazolamide      Other reaction(s): Unknown Allergic Reaction    Azithromycin      Other reaction(s): GI Upset    Barium Sulfate      Other reaction(s): GI Upset    Cefazolin     Loperamide      Other reaction(s): Unknown Allergic Reaction    Penicillins      Other reaction(s): Fever    Procaine Other (See Comments)     fever    Sulfa Antibiotics      fever       Medications:   Scheduled Meds:    chlorhexidine 15 mL Swish & Spit Q12H [address]   docusate sodium 100 mg Oral BID   [START ON 1/24/2018] levothyroxine 25 mcg Oral Early Morning   nitrofurantoin 100 mg Oral BID     Continuous Infusions:    sodium chloride 125 mL/hr     PRN Meds:    acetaminophen 650 mg Q6H PRN   bisacodyl 10 mg Daily PRN       VTE Pharmacologic Prophylaxis: Sequential compression device (Venodyne)   VTE Mechanical Prophylaxis: sequential compression device    Invasive lines and devices:   Invasive Devices     Peripheral Intravenous Line            Peripheral IV 01/23/18 Left Wrist less than 1 day                        Code Status: Level 1 - Full Code     Portions of the record may have been created with voice recognition software  Occasional wrong word or "sound a like" substitutions may have occurred due to the inherent limitations of voice recognition software  Read the chart carefully and recognize, using context, where substitutions have occurred       Riley Castillo MD

## 2018-01-24 NOTE — CASE MANAGEMENT
Initial Clinical Review    Admission: Date/Time/Statement: 1/23/18 @ 1715     Orders Placed This Encounter   Procedures    Inpatient Admission     Standing Status:   Standing     Number of Occurrences:   1     Order Specific Question:   Admitting Physician     Answer:   Albreto Ramirez     Order Specific Question:   Level of Care     Answer:   Critical Care [15]     Order Specific Question:   Estimated length of stay     Answer:   More than 2 Midnights     Order Specific Question:   Certification     Answer:   I certify that inpatient services are medically necessary for this patient for a duration of greater than two midnights  See H&P and MD Progress Notes for additional information about the patient's course of treatment  1/23/2018 (2 hours)  [address] [facility]   Acute on chronic intracranial subdural hematoma (HCC)   TRANSFER TO [facility]   PRIOR TO TRANSFER   NO MEDICATION       ED: Date/Time/Mode of Arrival:   ED Arrival Information     Expected Arrival Acuity Means of Arrival Escorted By Service Admission Type    - 1/23/2018 16:26 Immediate Ambulance [address] [facility]) [facility] Complaint    acute on chronic sdh          Chief Complaint:   Chief Complaint   Patient presents with    Fall     Trauma transfer from [facility]  Frequent falls  Since fall patient speech more slurred and lethargic  History of Illness:    Deo Mcclellan is a 67 y o  female who presents as a transfer from [facility]  She suffered multiple falls this past weekend and was noted to have slurred speech and instability today  She has had multiple falls with subdurals, and was recently discharged home from rehab  Not on any blood thinners or antiplatelets  Unknown LOC        ED Vital Signs:   ED Triage Vitals   Temperature Pulse Respirations Blood Pressure SpO2   01/23/18 1630 01/23/18 1630 01/23/18 1630 01/23/18 1630 01/23/18 1630   97 7 °F (36 5 °C) 78 20 138/72 97 %      Temp Source Heart Rate Source Patient Position - Orthostatic VS BP Location FiO2 (%)   01/23/18 1630 01/23/18 1630 01/23/18 1630 01/24/18 0800 --   Tympanic Monitor Lying Left arm       Pain Score       01/23/18 1645       No Pain        Wt Readings from Last 1 Encounters:   01/23/18 62 6 kg (137 lb 15 8 oz)       Vital Signs (abnormal):  01/24/18 0510  --  70  22  119/53  84  96 %   01/24/18 0410  98 1 °F (36 7 °C)   54  18   123/45  75  97 %   01/24/18 0300  --   54  16  126/52  --  97 %   01/24/18 0200  --  68  22  138/77  97  97 %   01/24/18 0100  --  72   26  160/79  128  95 %   01/24/18 0010  --  58  16  131/52  84  95 %   01/23/18 2310  --  58  14  124/51  78  95 %   01/23/18 2200  --  66   23  143/56  98  96 %             Abnormal Labs/Diagnostic Test Results:     CT BRAIN  INDICATION:  Slurred speech  Weakness  History of subarachnoid hemorrhage and subdural hygroma  Enlarging mixed attenuation subdural hemorrhage on the left containing chronic, subacute, and acute blood  Increasing effacement of left cerebral sulci (most pronounced at the vertex) and increasing rightward subfalcine shift  Neurosurgical consultation advised      Ref Range & Units 1/23/18 1425 Flag    RBC, UA None Seen, 0-5 /hpf 4-10   A    WBC, UA None Seen, 0-5, 5-55, 5-65 /hpf 2-4   A    Epithelial Cells None Seen, Occasional /hpf  Occasional     Bacteria, UA None Seen, Occasional /hpf  Innumerable   A       Ref Range & Units 1/23/18 1425 Flag   Color, UA  Yellow     Clarity, UA   Slightly Cloudy     Specific [place], UA 1 003 - 1 030 1 015     pH, UA 4 5 - 8 0 7 0     Leukocytes, UA Negative Small   A    Nitrite, UA Negative  Negative     Protein, UA Negative mg/dl  Negative     Glucose, UA Negative mg/dl  Negative     Ketones, UA Negative mg/dl Trace   A    Urobilinogen, UA 0 2, 1 0 E U /dl E U /dl 0 2     Bilirubin, UA Negative  Negative     Blood, UA Negative Small        MPV 8 9 - 12 7 fL 8 6   L    Platelets 993 - 390 Thousands/uL 426   H          Past Medical/Surgical History: Active Ambulatory Problems     Diagnosis Date Noted    [facility]A [facility]cerebral vascular accident) [facility] [address] ) 08/04/2017    Weakness of extremity 08/04/2017    Hyponatremia 08/04/2017    SAH (subarachnoid hemorrhage) ([facility] [address] ) 08/04/2017    [facility]2017    Lacunar infarct[facility] acute [facility] 08/05/2017    Left-sided weakness 08/05/2017    Dysarthria 08/05/2017    Debility 08/09/2017    Subdural hematoma (HCC) 12/15/2017    GERD (gastroesophageal reflux disease) 12/15/2017    Delirium 12/17/2017    Cognitive impairment 12/19/2017    Ambulatory dysfunction 12/19/2017    Fall 12/19/2017    Physical deconditioning 12/19/2017    Visual impairment 12/19/2017    Dysphagia 12/19/2017     Resolved Ambulatory Problems     Diagnosis Date Noted    Polypharmacy 12/19/2017     Past Medical History:    Chronic sinusitis     GERD (gastroesophageal reflux disease)     Hypotension     Seizures (HCC)        Admitting Diagnosis: Subdural hematoma ([facility] ) [I62 00]  Unspecified multiple injuries, initial encounter [T07  XXXA]    Age/Sex: 67 y o  female    Assessment/Plan:    Trauma Active Problems:   Acute on chronic subdural hemorrhage with increasing shift     Trauma Plan:   Admit to intensive care unit  Neurosurgical consultation    CONSULT NEURO SURGERY   Assessment:  1  Mixed left convexity subdural hemorrhage with midline shift and effacement  2  Gait dysfunction  3  Seizure disorder  4  Hx of left frontotemporal craniotomy for subdural hematoma evacuation 12/2017  5  Hx of lacunar infarct     Plan:  · Exam: Resting comfortably, easily aroused by voice, oriented x self, location, month  Disoriented to day and year  Chronic right facial weakness with right eye lateralized, does not cross midline  +Right inward and downward drift  Dysmetria right finger to nose  3/3 immediate object recognition   Speech is fluent, slightly slurred but understandable  · Imaging: personally reviewed and reviewed with attending  ? CT head wo: enlarging mixed attenuation subdural hemorrhage on the left containing chronic, subacute and acute blood  Increasing effacement of left cerebral sulci and increasing rightward subfalcine shift  · STAT CT head if decline in GCS >2 pts  · Hgb 12 1  · WBC 7 5, Platelets 726  · INR 1 03  · UA showed +small Leukocytes, innumerable bacteria  She was started on Macrobid for UTI  · DVT ppx: SCDs, hold AC/AP  · Pain control: patient denies any pain at this time  · Started on home dose of Keppra 500mg BID  · Continue Q1 Neuro checks  · Will discuss with neurosurgical team in regards to surgical intervention today vs tomorrow  Call with questions or concerns    CRITICAL CARE   Assessment: 68 yo female with acute on chronic SDH who presents as a transfer from [place] s/p multiple falls  She has a history of L craniectomy with drainage  Not on any blood thinners      Plan:           Neuro:   GCS x 15, AAO x 3  Pain: Tylenol 650 mg PO Q6 PRN (x 0 overnight)  Daily CAM-ICU, Q1 neuro checks, FU Neurosurgery recs for surgical planning                 CV:   BP currently 135/64 and pulse 60  Currently no pressor needs  Maintain MAPs > 65 but SBP < 160                  Lung:   Currently 96% oxygen saturation on room air  Supplemental oxygen as necessary  Incentive spirometry                 GI:   1 bowel movement overnight  Continue bowel regimen with Colace                 FEN:   Fluids: NS @ 100  +710 since admission  Electrolytes: Normal  Recheck BMP in AM and replete as necessary  Nutrition: NPO for possible surgery                 :   382 UOP overnight (0 5 mL/kg/hr)  BUN 7 and Creatinine 0 43  Continue Fluids                 ID: Afebrile overnight with max temperature 98 9 degrees  WBC 7 56 from 7 37  UA negative  Continue Nitrofurantoin 100 mg PO BID for prophylaxis                 Heme:   Hemoglobin 12 1 from 12 8   Recheck CBC in PM   Coag panel normal   Monitor for signs of advanced hemorrhage with neuro checks                 Endo:   Q6 glucose checks  Levothyroxine 25 mcg PO daily (home dose)                            Msk/Skin: Wounds documented in computer in past are healed  Monitor for skin breakdown  Pressure off-loading                 Disposition: Continued ICU stay   Will FU with Neurosurgery for surgical planning        Admission Orders:    FALL PRECAUTIONS  PT AND OT EVAL AND TREAT  NEURO CHECKS  Q1 HR  SEQUENTIAL COMPRESSION DEVICE   NPO    FINGER STICK GLUCOSE       Scheduled Meds:   chlorhexidine 15 mL Swish & Spit Q12H [facility]   docusate sodium 100 mg Oral BID   levETIRAcetam 500 mg Intravenous Q12H [facility]   levothyroxine 25 mcg Oral Early Morning   nitrofurantoin 100 mg Oral BID   potassium chloride 20 mEq Intravenous Q2H     Continuous Infusions:   sodium chloride 100 mL/hr Last Rate: 100 mL/hr (01/24/18 0600)     PRN Meds:   acetaminophen    bisacodyl

## 2018-01-25 ENCOUNTER — OFFICE VISIT (OUTPATIENT)
Dept: NEUROSURGERY | Facility: CLINIC | Age: 73
End: 2018-01-25

## 2018-01-25 ENCOUNTER — APPOINTMENT (INPATIENT)
Dept: RADIOLOGY | Facility: HOSPITAL | Age: 73
DRG: 026 | End: 2018-01-25
Payer: COMMERCIAL

## 2018-01-25 LAB
ANION GAP SERPL CALCULATED.3IONS-SCNC: 9 MMOL/L (ref 4–13)
ANION GAP SERPL CALCULATED.3IONS-SCNC: 9 MMOL/L (ref 4–13)
APTT PPP: 28 SECONDS (ref 23–35)
BASOPHILS # BLD AUTO: 0.01 THOUSANDS/ΜL (ref 0–0.1)
BASOPHILS # BLD MANUAL: 0 THOUSAND/UL (ref 0–0.1)
BASOPHILS NFR BLD AUTO: 0 % (ref 0–1)
BASOPHILS NFR MAR MANUAL: 0 % (ref 0–1)
BUN SERPL-MCNC: 6 MG/DL (ref 5–25)
BUN SERPL-MCNC: 7 MG/DL (ref 5–25)
CALCIUM SERPL-MCNC: 8.1 MG/DL (ref 8.3–10.1)
CALCIUM SERPL-MCNC: 8.4 MG/DL (ref 8.3–10.1)
CHLORIDE SERPL-SCNC: 106 MMOL/L (ref 100–108)
CHLORIDE SERPL-SCNC: 108 MMOL/L (ref 100–108)
CO2 SERPL-SCNC: 20 MMOL/L (ref 21–32)
CO2 SERPL-SCNC: 21 MMOL/L (ref 21–32)
CREAT SERPL-MCNC: 0.33 MG/DL (ref 0.6–1.3)
CREAT SERPL-MCNC: 0.37 MG/DL (ref 0.6–1.3)
EOSINOPHIL # BLD AUTO: 0 THOUSAND/ΜL (ref 0–0.61)
EOSINOPHIL # BLD MANUAL: 0 THOUSAND/UL (ref 0–0.4)
EOSINOPHIL NFR BLD AUTO: 0 % (ref 0–6)
EOSINOPHIL NFR BLD MANUAL: 0 % (ref 0–6)
ERYTHROCYTE [DISTWIDTH] IN BLOOD BY AUTOMATED COUNT: 13.3 % (ref 11.6–15.1)
ERYTHROCYTE [DISTWIDTH] IN BLOOD BY AUTOMATED COUNT: 13.4 % (ref 11.6–15.1)
GFR SERPL CREATININE-BSD FRML MDRD: 106 ML/MIN/1.73SQ M
GFR SERPL CREATININE-BSD FRML MDRD: 110 ML/MIN/1.73SQ M
GLUCOSE SERPL-MCNC: 113 MG/DL (ref 65–140)
GLUCOSE SERPL-MCNC: 114 MG/DL (ref 65–140)
GLUCOSE SERPL-MCNC: 119 MG/DL (ref 65–140)
GLUCOSE SERPL-MCNC: 95 MG/DL (ref 65–140)
HCT VFR BLD AUTO: 33.7 % (ref 34.8–46.1)
HCT VFR BLD AUTO: 34.5 % (ref 34.8–46.1)
HGB BLD-MCNC: 11.4 G/DL (ref 11.5–15.4)
HGB BLD-MCNC: 11.8 G/DL (ref 11.5–15.4)
INR PPP: 1.09 (ref 0.86–1.16)
LYMPHOCYTES # BLD AUTO: 0.71 THOUSAND/UL (ref 0.6–4.47)
LYMPHOCYTES # BLD AUTO: 0.84 THOUSANDS/ΜL (ref 0.6–4.47)
LYMPHOCYTES # BLD AUTO: 6 % (ref 14–44)
LYMPHOCYTES NFR BLD AUTO: 9 % (ref 14–44)
MCH RBC QN AUTO: 30.1 PG (ref 26.8–34.3)
MCH RBC QN AUTO: 30.7 PG (ref 26.8–34.3)
MCHC RBC AUTO-ENTMCNC: 33.8 G/DL (ref 31.4–37.4)
MCHC RBC AUTO-ENTMCNC: 34.2 G/DL (ref 31.4–37.4)
MCV RBC AUTO: 89 FL (ref 82–98)
MCV RBC AUTO: 90 FL (ref 82–98)
MONOCYTES # BLD AUTO: 0.21 THOUSAND/ΜL (ref 0.17–1.22)
MONOCYTES # BLD AUTO: 0.24 THOUSAND/UL (ref 0–1.22)
MONOCYTES NFR BLD AUTO: 2 % (ref 4–12)
MONOCYTES NFR BLD: 2 % (ref 4–12)
NEUTROPHILS # BLD AUTO: 8.27 THOUSANDS/ΜL (ref 1.85–7.62)
NEUTROPHILS # BLD MANUAL: 10.94 THOUSAND/UL (ref 1.85–7.62)
NEUTS SEG NFR BLD AUTO: 89 % (ref 43–75)
NEUTS SEG NFR BLD AUTO: 92 % (ref 43–75)
NRBC BLD AUTO-RTO: 0 /100 WBCS
NRBC BLD AUTO-RTO: 0 /100 WBCS
PLATELET # BLD AUTO: 350 THOUSANDS/UL (ref 149–390)
PLATELET # BLD AUTO: 378 THOUSANDS/UL (ref 149–390)
PLATELET BLD QL SMEAR: ADEQUATE
PMV BLD AUTO: 8.4 FL (ref 8.9–12.7)
PMV BLD AUTO: 8.5 FL (ref 8.9–12.7)
POTASSIUM SERPL-SCNC: 3.9 MMOL/L (ref 3.5–5.3)
POTASSIUM SERPL-SCNC: 4 MMOL/L (ref 3.5–5.3)
PROTHROMBIN TIME: 14.1 SECONDS (ref 12.1–14.4)
RBC # BLD AUTO: 3.79 MILLION/UL (ref 3.81–5.12)
RBC # BLD AUTO: 3.84 MILLION/UL (ref 3.81–5.12)
RBC MORPH BLD: NORMAL
SODIUM SERPL-SCNC: 136 MMOL/L (ref 136–145)
SODIUM SERPL-SCNC: 137 MMOL/L (ref 136–145)
WBC # BLD AUTO: 11.89 THOUSAND/UL (ref 4.31–10.16)
WBC # BLD AUTO: 9.36 THOUSAND/UL (ref 4.31–10.16)

## 2018-01-25 PROCEDURE — 82948 REAGENT STRIP/BLOOD GLUCOSE: CPT

## 2018-01-25 PROCEDURE — 85610 PROTHROMBIN TIME: CPT | Performed by: NEUROLOGICAL SURGERY

## 2018-01-25 PROCEDURE — 80048 BASIC METABOLIC PNL TOTAL CA: CPT | Performed by: PHYSICIAN ASSISTANT

## 2018-01-25 PROCEDURE — 85027 COMPLETE CBC AUTOMATED: CPT | Performed by: EMERGENCY MEDICINE

## 2018-01-25 PROCEDURE — 85025 COMPLETE CBC W/AUTO DIFF WBC: CPT | Performed by: PHYSICIAN ASSISTANT

## 2018-01-25 PROCEDURE — 80048 BASIC METABOLIC PNL TOTAL CA: CPT | Performed by: NEUROLOGICAL SURGERY

## 2018-01-25 PROCEDURE — 85007 BL SMEAR W/DIFF WBC COUNT: CPT | Performed by: EMERGENCY MEDICINE

## 2018-01-25 PROCEDURE — 92610 EVALUATE SWALLOWING FUNCTION: CPT

## 2018-01-25 PROCEDURE — 70450 CT HEAD/BRAIN W/O DYE: CPT

## 2018-01-25 PROCEDURE — 99024 POSTOP FOLLOW-UP VISIT: CPT | Performed by: NEUROLOGICAL SURGERY

## 2018-01-25 PROCEDURE — 85730 THROMBOPLASTIN TIME PARTIAL: CPT | Performed by: NEUROLOGICAL SURGERY

## 2018-01-25 PROCEDURE — 99232 SBSQ HOSP IP/OBS MODERATE 35: CPT | Performed by: PHYSICIAN ASSISTANT

## 2018-01-25 RX ORDER — LEVETIRACETAM 500 MG/1
500 TABLET ORAL EVERY 12 HOURS SCHEDULED
Status: DISCONTINUED | OUTPATIENT
Start: 2018-01-25 | End: 2018-01-30

## 2018-01-25 RX ORDER — VANCOMYCIN HYDROCHLORIDE 1 G/200ML
1000 INJECTION, SOLUTION INTRAVENOUS EVERY 12 HOURS
Status: COMPLETED | OUTPATIENT
Start: 2018-01-25 | End: 2018-01-25

## 2018-01-25 RX ORDER — ACETAMINOPHEN 325 MG/1
650 TABLET ORAL EVERY 6 HOURS
Status: DISCONTINUED | OUTPATIENT
Start: 2018-01-25 | End: 2018-01-27

## 2018-01-25 RX ORDER — FENTANYL CITRATE 50 UG/ML
INJECTION, SOLUTION INTRAMUSCULAR; INTRAVENOUS
Status: DISCONTINUED
Start: 2018-01-25 | End: 2018-01-25 | Stop reason: WASHOUT

## 2018-01-25 RX ORDER — QUETIAPINE FUMARATE 25 MG/1
25 TABLET, FILM COATED ORAL ONCE
Status: COMPLETED | OUTPATIENT
Start: 2018-01-25 | End: 2018-01-25

## 2018-01-25 RX ADMIN — ACETAMINOPHEN 650 MG: 325 TABLET, FILM COATED ORAL at 16:26

## 2018-01-25 RX ADMIN — SODIUM CHLORIDE 75 ML/HR: 0.9 INJECTION, SOLUTION INTRAVENOUS at 18:43

## 2018-01-25 RX ADMIN — CHLORHEXIDINE GLUCONATE 15 ML: 1.2 RINSE ORAL at 21:13

## 2018-01-25 RX ADMIN — LEVETIRACETAM 500 MG: 500 TABLET ORAL at 21:14

## 2018-01-25 RX ADMIN — NITROFURANTOIN (MONOHYDRATE/MACROCRYSTALS) 100 MG: 75; 25 CAPSULE ORAL at 12:07

## 2018-01-25 RX ADMIN — QUETIAPINE FUMARATE 25 MG: 25 TABLET ORAL at 16:27

## 2018-01-25 RX ADMIN — NITROFURANTOIN (MONOHYDRATE/MACROCRYSTALS) 100 MG: 75; 25 CAPSULE ORAL at 18:48

## 2018-01-25 RX ADMIN — VANCOMYCIN HYDROCHLORIDE 1000 MG: 1 INJECTION, SOLUTION INTRAVENOUS at 05:42

## 2018-01-25 RX ADMIN — DOCUSATE SODIUM 100 MG: 100 CAPSULE, LIQUID FILLED ORAL at 12:00

## 2018-01-25 RX ADMIN — CHLORHEXIDINE GLUCONATE 15 ML: 1.2 RINSE ORAL at 12:08

## 2018-01-25 RX ADMIN — LEVETIRACETAM 500 MG: 100 INJECTION, SOLUTION INTRAVENOUS at 08:17

## 2018-01-25 RX ADMIN — SODIUM CHLORIDE 75 ML/HR: 0.9 INJECTION, SOLUTION INTRAVENOUS at 03:21

## 2018-01-25 RX ADMIN — DOCUSATE SODIUM 100 MG: 100 CAPSULE, LIQUID FILLED ORAL at 18:45

## 2018-01-25 NOTE — PLAN OF CARE
Problem: SLP ADULT - SWALLOWING, IMPAIRED  Goal: Initial SLP swallow eval performed  Outcome: Completed Date Met: 01/25/18

## 2018-01-25 NOTE — SPEECH THERAPY NOTE
Speech Language/Pathology  Speech/Language Pathology  Assessment    Patient Name: Nimo Coughlin  XBXSL'D Date: 1/25/2018     Problem List  Patient Active Problem List   Diagnosis    [facility] [address] [facility]    Weakness of extremity    Hyponatremia    [facility]) [facility] [address] [facility]    [facility]achnoid hemorrhage) [facility] [address] [facility]    Lacunar infarct[facility] acute ([facility] [address] )    Left-sided weakness    Dysarthria    Debility    Subdural hematoma (HCC)    GERD (gastroesophageal reflux disease)    Delirium    Cognitive impairment    Ambulatory dysfunction    Fall    Physical deconditioning    Visual impairment    Dysphagia    Urinary tract infection     Past Medical History  Past Medical History:   Diagnosis Date    Chronic sinusitis     GERD (gastroesophageal reflux disease)     Hypotension     Seizures ([facility] [address] )      Past Surgical History  Past Surgical History:   Procedure Laterality Date    ABDOMINAL SURGERY      BRAIN SURGERY      CLOSED REDUCTION DISTAL FEMUR FRACTURE      CRANIOTOMY      CRANIOTOMY Left 12/15/2017    Procedure: Left fronto-temporal craniotomy for evacuation of subdural hematoma and membrane stripping;  Surgeon: Aden Antonio MD;  [facility]: [facility];  Service: Neurosurgery    CRANIOTOMY Left 1/24/2018    Procedure: CRANIOTOMY FOR EVACUATION OF SUBDURAL HEMATOMA;  Surgeon: Aden Antonio MD;  Location: [facility];  Service: Neurosurgery    HYSTERECTOMY      HYSTERECTOMY      LUNG LOBECTOMY Right     OOPHORECTOMY      SPLENECTOMY       From H&P:  HPI:  Nimo Coughlin is a 67 y o  female who presents as a transfer from [facility]  She suffered multiple falls this past weekend and was noted to have slurred speech and instability today  She has had multiple falls with subdurals, and was recently discharged home from rehab  Not on any blood thinners or antiplatelets  Unknown LOC    Mechanism:Fall    1/24/18:  S/p craniotomy for evacuation of subdural hematoma (L)    Pt seen by speech therapy last admission in December  On a dysphagia level 3 diet with thin liquids at discharge  Reason for consult:  R/o aspiration  Determine safest and least restrictive diet    Current diet:  npo  Premorbid diet[de-identified]  3/thin  O2 requirement:  Room air  Voice/Speech:  Dysarthric; paraphasic errors noted in conversational speech with poor awareness  Follows commands:  yes                       Cognitive Status:  Awake, alert, oriented to year and city  Oral [facility] exam:  Adequate dentition  Dry mucosa  Full symmetry  Decreased lingual lateralization/coordination  Items administered:  Puree, toast, thin liquids  Liquids were taken by straw  Oral stage:  Lip closure: good  Mastication: prolonged  Bolus formation: wfls  Bolus control:  Transfer: slow  Oral residue: mild    Pharyngeal stage:  Swallow promptness: prompt  Laryngeal rise: fair  Wet voice: no  Throat clear: no  Cough: no  Secondary swallows: no  Audible swallows: no  No s/s aspiration    Esophageal stage:  No s/s reported    Summary:  Pt presents w/ mild/mod oral dysphagia characterized by prolonged mastication and transfer of solids with at least mild oral residue  Pharyngeal swallow appears WFLs  No overt s/s aspiration observed  Recommendations:  Diet: dysphagia level 2 to start  Liquid: thin  Meds: as tolerated  Positioning:Upright  Aspiration precautions    Aspiration precautions posted    Results d/w:  Pt, nurse, physician      Goal(s):    1  Pt will tolerate dysphagia level 2 diet with thin liquids without overt difficulty or s/s aspiration  2   Pt will tolerate trials of dysphagia level 3 diet without overt difficulty or s/s aspiration  3   Pt will tolerate least restrictive diet w/out s/s aspiration or oral/pharyngeal difficulties

## 2018-01-25 NOTE — PROGRESS NOTES
Postop day 1 repeat craniotomy for evacuation of subdural  The plates and screws were identified to be bent and dislodged consistent with a direct strike to the craniotomy flap  Patient has no complaints today  On examination she is awake and alert  Her extraocular movements are full with her pre standing partial 3rd nerve palsy on the right  He is able to move all 4 extremities  She has finger agnosia and difficulty with calculation (Gerstman's)  Her speech is dysarthric but intelligible and much improved from yesterday  CT scan of the brain is reviewed which demonstrates expected postoperative changes with minor improvement in the midline shift      Impression:  Patient is much more alert today and her speech is improved    Plan:  Continue drainage at current level

## 2018-01-25 NOTE — OP NOTE
OPERATIVE REPORT  PATIENT NAME: Army Bran    :  1945  MRN: [id number]  Pt Location: [facility] OR ROOM 17    SURGERY DATE: 2018    Surgeon(s) and Role:     * Mortimer Rush, MD - Primary    Preop Diagnosis:  Subdural hematoma ([address] ) [I62 00]    Post-Op Diagnosis Codes:     * Subdural hematoma ([address] ) [I62 00]    Procedure(s) (LRB):  CRANIOTOMY FOR EVACUATION OF SUBDURAL HEMATOMA (Left)    Specimen(s):  ID Type Source Tests Collected by Time Destination   1 : left subdural membrane Tissue Other TISSUE EXAM Mortimer Rush, MD 2018 1818        Estimated Blood Loss:   Minimal    Drains:  Urethral Catheter Latex 16 Fr  (Active)   Collection Container Standard drainage bag 2018  6:11 PM   Number of days: 0       Ventriculostomy/Subdural Subdural drainage catheter Left Frontal region (Active)   Drain Status Capped 2018 12:00 AM   Number of days: 0       Ventriculostomy/Subdural Subdural drainage catheter Left Parietal region (Active)   Drain Status Capped 2018 12:00 AM   Number of days: 0       Anesthesia Type:   General    Operative Indications:  Subdural hematoma ([address] ) [I62 00]      Operative Findings:  Recurrent subdural hematoma with multiple membranes    Complications:   None    Procedure and Technique:   after adequate general endotracheal anesthesia the patient was placed supine on the operating table  The head was placed in a horseshoe  The hair was clipped and the scalp was prepped with Betadine soap then DuraPrep  Double layer drapes were placed in normal fashion and a 3 m Betadine impregnated sticky drape was placed over these  A TIME-OUT WAS CALLED AND ALL PARAMETERS A TIME-OUT WERE FOLLOWED  The skin was infiltrated with 1% lidocaine with 1 100,000 epinephrine  A 15  Blade was used to incise the skin  Bovie and bipolar were used throughout the procedure to maintain hemostasis  Bovie and a cutting setting was used to divide the tissues to the galea    The galea was identified coagulated and divided  A combination of blunt and sharp dissection was carried to elevate the galea from the underlying bone  A cerebellar style within retractor was placed after Bryanna clips were placed on the scalp edges  Two of the screws were loose on the plates and 1 of the plate was bent all of this is suggestive of a direct head strike to the craniotomy flap  The craniotomy flap was removed  The dura was opened in a cruciate fashion  Four 0 Nurolon sutures  Were used to maintain the dura on traction  Fluid egress under very high pressure  This was yellow tinged with other regions which were motor oil in quality  Copious quantities of irrigation were used to cleanse out the region  A series of membranes were identified and opened and these were sent to the laboratory as specimen  A back to seal catheter was placed anteriorly and a 2nd was placed posteriorly  These were tunneled and exited through the skin inferior to the incision  The dura was then approximated with interrupted and running 4 0 Nurolon suture  Gel-Foam was placed in the epidural space  The bone flap was replaced with the Biomet bone fixation systems  All screws were removed and replaced  The bent plate was straightened prior to reimplantation  The temporalis muscle and fascia were approximated with interrupted 2 0 Vicryl suture  The galea was closed with interrupted inverted to 0 Vicryl suture  The epidermis was approximated with a running 4 0 Monocryl  Each drain was secured into place with interrupted 2 0 Monocryl sutures  History Crohn was placed over the incision  0 5% bupivacaine with 1 to 160291 epinephrine was injected into the surrounding tissues  A stockinette was placed as a dressing so that she would be less attempted to pull at the drain exit sites  The patient was then taken to the PACU    I was present for the entire procedure    Patient Disposition:  PACU     SIGNATURE: Yuki Aiken MD  DATE: January 24, 2018  TIME: 7:31 PM

## 2018-01-25 NOTE — POST OP PROGRESS NOTES
Progress Note - Neurosurgery   Natasha Maldonado 67 y o  female MRN: [id number]  Unit/Bed#: ICU 09 Encounter: [id number]    Assessment:  1  POD#2 left craniotomy for evacuation of subdural hematoma  2  Mixed left convexity subdural hematoma with midline shift and effacement  3  Gait dysfunction   4  Seizure disorder  5  History of left frontotemporal craniotomy for subdural hematoma evacuation 12/2017  6  History of lacunar infarct     Plan:  · Exam: GCS 14-15 (-1 confusion)  AAO x self, disoriented to location and time  FREEMAN wo focal weakness  LT intact  · Two subdural drains in placed with 4cc and 20cc within 12 hours  · Anterior drain d/c'd without complications, patient tolerated well, closed drain site with hand ties  · Will continue to monitor posterior drain's output, if remains low possible d/c tomorrow  · CT head once drains are removed  · Imaging: personally reviewed and reviewed by attending:  · 1/25 - CT head wo: postoperative changes with left sided craniotomy, subdural hematoma evacuation and subdural drain catheters placement  No acute intracranial abnormality  Cerebral atrophy  · STAT CT head wo contrast if decline in GCS >2 pts/1 hr  · SBP goal <160 - patient has history of hypotension   · PT/OT: from nsx standpoint may mobilize  · DVT ppx: SCDs  · Na 137  · K+ 3 4 - replete as needed per primary team  · WBC 9 58  · Pain control: Deferred to primary team  · Medical management by surgical critical care team while in the intensive care unit  · Continue to monitor exam closely  Call with questions or concerns  Subjective/Objective   Chief Complaint: "I feel much better"    Subjective: patient states she's doing well, she is feeling much better  She denies headaches, dizziness, chest pain, SOB, abdominal pain/N/V, weakness or numbness  When asked she admits to maybe having a difficult time with word finding but she isn't sure  Objective: sitting up in bed eating breakfast  NAD       I/O 01/23 0701 - 01/24 0700 01/24 0701 - 01/25 0700 01/25 0701 - 01/26 0700    I V  (mL/kg) 1092 1 (17 4) 3233 8 (51 7)     IV Piggyback  500     Total Intake(mL/kg) 1092 1 (17 4) 3733 8 (59 6)     Urine (mL/kg/hr) 382 1868 (1 2) 300 (0 5)    Drains  90 (0 1) 51 (0 1)    Stool  0 (0)     Total Output 382 1958 351    Net +710 1 +1775 8 -351           Unmeasured Urine Occurrence 4 x 2 x     Unmeasured Stool Occurrence  1 x           Invasive Devices     Peripheral Intravenous Line            Peripheral IV 01/23/18 Right Antecubital 1 day    Peripheral IV 01/24/18 Right Forearm 1 day          Arterial Line            Arterial Line 01/24/18 Right Radial less than 1 day          Drain            Urethral Catheter Latex 16 Fr  less than 1 day    Ventriculostomy/Subdural Subdural drainage catheter Left Frontal region less than 1 day    Ventriculostomy/Subdural Subdural drainage catheter Left Parietal region less than 1 day                Physical Exam:  Vitals: Blood pressure (!) 128/49, pulse 70, temperature 97 6 °F (36 4 °C), temperature source Oral, resp  rate 16, height 5' 7" (1 702 m), weight 62 6 kg (137 lb 15 8 oz), SpO2 100 %, not currently breastfeeding  ,Body mass index is 21 61 kg/m²  Hemodynamic Monitoring: MAP: Arterial Line MAP (mmHg): 78 mmHg     General appearance: alert, appears stated age, cooperative and no distress  Head: Normocephalic, left subdural drains in place  Incision is nonertyematous, nonedematous  Eyes: baseline right facial weakness with CN 3 palsy, unable to lateral right eye  PERRL   Lungs: non labored breathing  Heart: regular heart rate  Neurologic:   Mental status: Alert, oriented x self, disoriented to location and time, Speech is baseline, more fluent compared to yesterday  Sentences are more fluent  Able to repeat sentences  Able to perform simple math of 2+2, 4*4, unable to do serial math of 3*3*3  3/3 immediate object recall intact     Cranial nerves: grossly intact (Cranial nerves II-XII)  Facial symmetry at rest and with expression  Tongue is midline  Sensory: normal to LT in the face, bilateral upper and lower extremities  Motor: moving all extremities without focal weakness, strength 5/5 throughout upper and lower extremities  Reflexes: 2+ and symmetric  negative hartmann, + clonus bilaterally   Coordination: finger to nose normal bilaterally, slight right upward drift      Lab Results:    Results from last 7 days  Lab Units 01/25/18  0427 01/25/18  0025 01/24/18  0541 01/23/18  1326   WBC Thousand/uL 9 36 11 89* 7 56 7 37   HEMOGLOBIN g/dL 11 4* 11 8 12 1 12 8   HEMATOCRIT % 33 7* 34 5* 34 7* 38 7   PLATELETS Thousands/uL 378 350 363 426*   NEUTROS PCT % 89*  --  61 73   MONOS PCT % 2*  --  8 7   MONO PCT MAN %  --  2*  --   --        Results from last 7 days  Lab Units 01/25/18  0427 01/25/18  0025 01/24/18  0541   SODIUM mmol/L 137 136 137   POTASSIUM mmol/L 4 0 3 9 3 5   CHLORIDE mmol/L 108 106 106   CO2 mmol/L 20* 21 25   BUN mg/dL 6 7 7   CREATININE mg/dL 0 37* 0 33* 0 43*   CALCIUM mg/dL 8 4 8 1* 8 5   GLUCOSE RANDOM mg/dL 114 119 79               Results from last 7 days  Lab Units 01/25/18  0427 01/23/18  1653   INR  1 09 1 03   PTT seconds 28 24     No results found for: TROPONINT  ABG:No results found for: PHART, PSL9AUW, PO2ART, CFS7RHX, D9YCHFKQ, BEART, SOURCE    Imaging Studies: I have personally reviewed pertinent reports  and I have personally reviewed pertinent films in PACS  CT head wo contrast   Final Result   1  Postoperative changes with left-sided craniotomy, evacuation of subdural hemorrhage and subdural drainage catheter placement  No acute intracranial abnormality  2   Cerebral atrophy with chronic small vessel ischemic change        Findings are consistent with the preliminary report from [facility] which was provided shortly after completion of the exam                       Workstation performed: [id number]             EKG, Pathology, and Other Studies: I have personally reviewed pertinent reports        VTE  Prophylaxis: Sequential compression device (Venodyne)

## 2018-01-25 NOTE — PROGRESS NOTES
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  Plan:          Neuro:   GCS x 15, AAO x 3  Pain: Tylenol 650 mg PO Q6 PRN (x 0 overnight)  Left frontal drain put out 20, left parietal drain put out 70  Keppra 500 mg IV BID for seizure prophylaxis,   Daily CAM-ICU, Q1 neuro checks, Elevate HOB < 30 degrees  Avoid benzos for agitation  Mitts and waist belt for restraint, Take off as soon as alert and re-oriented                 CV:   BP currently 136/52 and pulse 74  MAPs ranging from    Currently no pressor needs  Maintain MAPs > 65 but SBP < 160  Lung:   Currently 100% oxygen saturation on room air  Supplemental oxygen as necessary  Incentive spirometry                 GI:   1 bowel movement overnight  Continue bowel regimen with Colace and Ducolax PRN                 FEN:   Fluids: NS @ 75  +2485 since admission  Electrolytes: Normal  Recheck BMP in AM and replete as necessary  Nutrition: NPO until more oriented  :   1868 UOP overnight (1 24 mL/kg/hr)  BUN 6 and Creatinine 0 37  Continue Fluids                 ID:   Afebrile overnight with max temperature 98 1 degrees  WBC 9 36 from 11 89  UA negative  Continue Nitrofurantoin 100 mg PO BID for prophylaxis  Finish archie-op vancomycin                 Heme:   Hemoglobin 11 4 from 11 8  Recheck CBC in PM   Coag panel normal   Monitor for signs of advanced hemorrhage with neuro checks                 Endo:   Glucose 114 from 119  DC Q6 glucose checks  Levothyroxine 25 mcg PO daily (home dose)                            Msk/Skin: Monitor for skin breakdown  Pressure off-loading                 Disposition: Continued ICU stay  Drain monitoring ad Q1 neuro cecs      Chief Complaint: Confused and not comprehensible    HPI/24hr events: Per the nurse, patient did well overnight  Continued slurred speech but no headaches  Was somnolent initially post-op  Did get agitated and confused overnight  Physical Exam:   General: Resting comfortably, NAD  Neuro: Cranial nerve exam grossly intact  Intact and symmetrical motor sensory in upper and lower extremities with bilateral strength 5/5 in upper and lower extremities  Unable to fully assess RUE since in mitts  Much improved from day yesterday in terms of orientation  Does get agitated during conversation easily but redirectable  Heart: RRR  Lungs: CTA BL  Abdomen: Soft, NTND    Vitals:    18 0300 18 0430 18 0500 18 0600   BP:       BP Location:       Pulse: 62 72 72 74   Resp: 15 14 18 14   Temp:   98 1 °F (36 7 °C)    TempSrc:       SpO2: 91% 99% 100% 100%   Weight:       Height:         Arterial Line BP: 136/52  Arterial Line MAP (mmHg): 82 mmHg    Temperature:   Temp (24hrs), Av 3 °F (36 8 °C), Min:97 2 °F (36 2 °C), Max:99 4 °F (37 4 °C)    Current: Temperature: 98 1 °F (36 7 °C)    Weights:   IBW: 61 6 kg    Body mass index is 21 61 kg/m²    Weight (last 2 days)     Date/Time   Weight    18 16:46:06  62 6 (137 99)               Non-Invasive/Invasive Ventilation Settings:  Respiratory    Lab Data (Last 4 hours)    None         O2/Vent Data (Last 4 hours)    None              No results found for: PHART, DHP2ZJB, PO2ART, GKD6EVH, M9CPTWFR, BEART, SOURCE  SpO2: SpO2: 100 %    Intake and Outputs:  I/O        07 -  0700  07 -  0700  07 -  0700    I V  (mL/kg)  1092 1 (17 4)     Total Intake(mL/kg)  1092 1 (17 4)     Urine (mL/kg/hr)  382     Total Output   382      Net   +710 1             Unmeasured Urine Occurrence  4 x         UOP: 1 24/hour   Nutrition:       Labs:     Results from last 7 days  Lab Units 18  0427 18  0025 18  0541 18  1326   WBC Thousand/uL 9 36 11 89* 7 56 7 37   HEMOGLOBIN g/dL 11 4* 11 8 12 1 12 8 HEMATOCRIT % 33 7* 34 5* 34 7* 38 7   PLATELETS Thousands/uL 378 350 363 426*   NEUTROS PCT % 89*  --  61 73   MONOS PCT % 2*  --  8 7   MONO PCT MAN %  --  2*  --   --         Results from last 7 days  Lab Units 01/25/18  0427 01/25/18  0025 01/24/18  0541   SODIUM mmol/L 137 136 137   POTASSIUM mmol/L 4 0 3 9 3 5   CHLORIDE mmol/L 108 106 106   CO2 mmol/L 20* 21 25   BUN mg/dL 6 7 7   CREATININE mg/dL 0 37* 0 33* 0 43*   CALCIUM mg/dL 8 4 8 1* 8 5   GLUCOSE RANDOM mg/dL 114 119 79                Results from last 7 days  Lab Units 01/25/18  0427 01/23/18  1653   INR  1 09 1 03   PTT seconds 28 24           0  Lab Value Date/Time   TROPONINI <0 02 12/14/2017 1954   TROPONINI 0 02 08/04/2017 1225   TROPONINI 0 16 (H) 08/02/2017 1456       Imaging: CT Head shows enlarged L SDH acute on chronic with mass effect  EKG: Preliminarily normal sinus rhythm per chart review  Micro:  No results found for: Arnel Chu, SPUTUMCULTUR    Allergies:    Allergies   Allergen Reactions    Acetazolamide      Other reaction(s): Unknown Allergic Reaction    Azithromycin      Other reaction(s): GI Upset    Barium Sulfate      Other reaction(s): GI Upset    Cefazolin     Loperamide      Other reaction(s): Unknown Allergic Reaction    Penicillins      Other reaction(s): Fever    Procaine Other (See Comments)     fever    Sulfa Antibiotics      fever       Medications:   Scheduled Meds:    chlorhexidine 15 mL Swish & Spit Q12H [address]   docusate sodium 100 mg Oral BID   docusate sodium 100 mg Oral BID   levETIRAcetam 500 mg Intravenous Q12H [address]   levothyroxine 25 mcg Oral Early Morning   nitrofurantoin 100 mg Oral BID   vancomycin 1,000 mg Intravenous Q12H     Continuous Infusions:    sodium chloride 100 mL/hr Last Rate: Stopped (01/24/18 2054)   sodium chloride 75 mL/hr Last Rate: 75 mL/hr (01/25/18 0321)   sodium chloride 75 mL/hr Last Rate: 75 mL/hr (01/24/18 2054)     PRN Meds:    acetaminophen 650 mg Q6H PRN bisacodyl 10 mg Daily PRN   bisacodyl 10 mg Daily PRN   ondansetron 4 mg Q6H PRN       VTE Pharmacologic Prophylaxis: Reason for no pharmacologic prophylaxis SDH  Only mechanical DVT Ppx  VTE Mechanical Prophylaxis: sequential compression device    Invasive lines and devices: Invasive Devices     Peripheral Intravenous Line            Peripheral IV 01/23/18 Right Antecubital 1 day    Peripheral IV 01/24/18 Right Forearm less than 1 day          Arterial Line            Arterial Line 01/24/18 Right Radial less than 1 day          Drain            Urethral Catheter Latex 16 Fr  less than 1 day    Ventriculostomy/Subdural Subdural drainage catheter Left Frontal region less than 1 day    Ventriculostomy/Subdural Subdural drainage catheter Left Parietal region less than 1 day                   Code Status: Level 1 - Full Code     Portions of the record may have been created with voice recognition software  Occasional wrong word or "sound a like" substitutions may have occurred due to the inherent limitations of voice recognition software  Read the chart carefully and recognize, using context, where substitutions have occurred       Mary Hargrove MD

## 2018-01-25 NOTE — SOCIAL WORK
CM met with pt to discuss d/c plan   Pt was previously at [facility], She doesn't want to return there but is willing to go to [facility]  CM will discuss with pt's  and therapy

## 2018-01-25 NOTE — PROGRESS NOTES
Critical Care Interval Progress Note:   Krys Arita 67 y o  female MRN: [id number]    Unit/Bed#: ICU 09 Encounter: [id number]    Summary of Critical Care:    Agitation requiring multiple provider re-examinations to reorient and reassess neuro status, trial seroquel 50mg, if refractory can trial zyprexa 2 5mg w/ caution given can lower seizure threshold  Limit haldol, benzos  Counseling / Coordination of Care: Total Critical Care time spent 16 minutes excluding procedures, teaching and family updates

## 2018-01-26 LAB
ANION GAP SERPL CALCULATED.3IONS-SCNC: 6 MMOL/L (ref 4–13)
APTT PPP: 27 SECONDS (ref 23–35)
BASOPHILS # BLD AUTO: 0.03 THOUSANDS/ΜL (ref 0–0.1)
BASOPHILS NFR BLD AUTO: 0 % (ref 0–1)
BUN SERPL-MCNC: 6 MG/DL (ref 5–25)
CALCIUM SERPL-MCNC: 8.5 MG/DL (ref 8.3–10.1)
CHLORIDE SERPL-SCNC: 105 MMOL/L (ref 100–108)
CO2 SERPL-SCNC: 26 MMOL/L (ref 21–32)
CREAT SERPL-MCNC: 0.47 MG/DL (ref 0.6–1.3)
EOSINOPHIL # BLD AUTO: 0.16 THOUSAND/ΜL (ref 0–0.61)
EOSINOPHIL NFR BLD AUTO: 2 % (ref 0–6)
ERYTHROCYTE [DISTWIDTH] IN BLOOD BY AUTOMATED COUNT: 13.3 % (ref 11.6–15.1)
GFR SERPL CREATININE-BSD FRML MDRD: 98 ML/MIN/1.73SQ M
GLUCOSE SERPL-MCNC: 110 MG/DL (ref 65–140)
GLUCOSE SERPL-MCNC: 82 MG/DL (ref 65–140)
GLUCOSE SERPL-MCNC: 84 MG/DL (ref 65–140)
GLUCOSE SERPL-MCNC: 94 MG/DL (ref 65–140)
HCT VFR BLD AUTO: 34.4 % (ref 34.8–46.1)
HGB BLD-MCNC: 11.5 G/DL (ref 11.5–15.4)
LYMPHOCYTES # BLD AUTO: 1.95 THOUSANDS/ΜL (ref 0.6–4.47)
LYMPHOCYTES NFR BLD AUTO: 20 % (ref 14–44)
MAGNESIUM SERPL-MCNC: 2.1 MG/DL (ref 1.6–2.6)
MCH RBC QN AUTO: 29.6 PG (ref 26.8–34.3)
MCHC RBC AUTO-ENTMCNC: 33.4 G/DL (ref 31.4–37.4)
MCV RBC AUTO: 89 FL (ref 82–98)
MONOCYTES # BLD AUTO: 0.86 THOUSAND/ΜL (ref 0.17–1.22)
MONOCYTES NFR BLD AUTO: 9 % (ref 4–12)
NEUTROPHILS # BLD AUTO: 6.56 THOUSANDS/ΜL (ref 1.85–7.62)
NEUTS SEG NFR BLD AUTO: 69 % (ref 43–75)
NRBC BLD AUTO-RTO: 0 /100 WBCS
PHOSPHATE SERPL-MCNC: 2.2 MG/DL (ref 2.3–4.1)
PLATELET # BLD AUTO: 392 THOUSANDS/UL (ref 149–390)
PMV BLD AUTO: 8.4 FL (ref 8.9–12.7)
POTASSIUM SERPL-SCNC: 3.4 MMOL/L (ref 3.5–5.3)
RBC # BLD AUTO: 3.88 MILLION/UL (ref 3.81–5.12)
SODIUM SERPL-SCNC: 137 MMOL/L (ref 136–145)
WBC # BLD AUTO: 9.58 THOUSAND/UL (ref 4.31–10.16)

## 2018-01-26 PROCEDURE — 85025 COMPLETE CBC W/AUTO DIFF WBC: CPT | Performed by: PHYSICIAN ASSISTANT

## 2018-01-26 PROCEDURE — G8987 SELF CARE CURRENT STATUS: HCPCS

## 2018-01-26 PROCEDURE — 97163 PT EVAL HIGH COMPLEX 45 MIN: CPT | Performed by: PHYSICAL THERAPIST

## 2018-01-26 PROCEDURE — 92526 ORAL FUNCTION THERAPY: CPT

## 2018-01-26 PROCEDURE — 99291 CRITICAL CARE FIRST HOUR: CPT | Performed by: SURGERY

## 2018-01-26 PROCEDURE — G8988 SELF CARE GOAL STATUS: HCPCS

## 2018-01-26 PROCEDURE — 92523 SPEECH SOUND LANG COMPREHEN: CPT

## 2018-01-26 PROCEDURE — 85730 THROMBOPLASTIN TIME PARTIAL: CPT | Performed by: ORTHOPAEDIC SURGERY

## 2018-01-26 PROCEDURE — 97167 OT EVAL HIGH COMPLEX 60 MIN: CPT

## 2018-01-26 PROCEDURE — G8979 MOBILITY GOAL STATUS: HCPCS | Performed by: PHYSICAL THERAPIST

## 2018-01-26 PROCEDURE — 80048 BASIC METABOLIC PNL TOTAL CA: CPT | Performed by: ORTHOPAEDIC SURGERY

## 2018-01-26 PROCEDURE — 83735 ASSAY OF MAGNESIUM: CPT | Performed by: PHYSICIAN ASSISTANT

## 2018-01-26 PROCEDURE — 84100 ASSAY OF PHOSPHORUS: CPT | Performed by: PHYSICIAN ASSISTANT

## 2018-01-26 PROCEDURE — 82948 REAGENT STRIP/BLOOD GLUCOSE: CPT

## 2018-01-26 PROCEDURE — G8978 MOBILITY CURRENT STATUS: HCPCS | Performed by: PHYSICAL THERAPIST

## 2018-01-26 RX ORDER — POTASSIUM CHLORIDE 20 MEQ/1
40 TABLET, EXTENDED RELEASE ORAL ONCE
Status: COMPLETED | OUTPATIENT
Start: 2018-01-26 | End: 2018-01-26

## 2018-01-26 RX ORDER — QUETIAPINE FUMARATE 25 MG/1
25 TABLET, FILM COATED ORAL
Status: DISCONTINUED | OUTPATIENT
Start: 2018-01-26 | End: 2018-01-27

## 2018-01-26 RX ORDER — OLANZAPINE 10 MG/1
2.5 INJECTION, POWDER, LYOPHILIZED, FOR SOLUTION INTRAMUSCULAR ONCE
Status: COMPLETED | OUTPATIENT
Start: 2018-01-26 | End: 2018-01-26

## 2018-01-26 RX ORDER — POTASSIUM CHLORIDE 14.9 MG/ML
20 INJECTION INTRAVENOUS
Status: DISCONTINUED | OUTPATIENT
Start: 2018-01-26 | End: 2018-01-26

## 2018-01-26 RX ADMIN — WATER: 1 INJECTION INTRAMUSCULAR; INTRAVENOUS; SUBCUTANEOUS at 15:23

## 2018-01-26 RX ADMIN — LEVETIRACETAM 500 MG: 500 TABLET ORAL at 21:31

## 2018-01-26 RX ADMIN — NITROFURANTOIN (MONOHYDRATE/MACROCRYSTALS) 100 MG: 75; 25 CAPSULE ORAL at 18:01

## 2018-01-26 RX ADMIN — DOCUSATE SODIUM 100 MG: 100 CAPSULE, LIQUID FILLED ORAL at 18:01

## 2018-01-26 RX ADMIN — POTASSIUM CHLORIDE 40 MEQ: 1500 TABLET, EXTENDED RELEASE ORAL at 15:53

## 2018-01-26 RX ADMIN — WATER 10 ML: 1 INJECTION INTRAMUSCULAR; INTRAVENOUS; SUBCUTANEOUS at 22:25

## 2018-01-26 RX ADMIN — DIBASIC SODIUM PHOSPHATE, MONOBASIC POTASSIUM PHOSPHATE AND MONOBASIC SODIUM PHOSPHATE 2 TABLET: 852; 155; 130 TABLET ORAL at 09:40

## 2018-01-26 RX ADMIN — LEVETIRACETAM 500 MG: 500 TABLET ORAL at 08:42

## 2018-01-26 RX ADMIN — ACETAMINOPHEN 650 MG: 325 TABLET, FILM COATED ORAL at 05:32

## 2018-01-26 RX ADMIN — OLANZAPINE 2.5 MG: 10 INJECTION, POWDER, FOR SOLUTION INTRAMUSCULAR at 22:25

## 2018-01-26 RX ADMIN — ACETAMINOPHEN 650 MG: 325 TABLET, FILM COATED ORAL at 12:44

## 2018-01-26 RX ADMIN — OLANZAPINE 2.5 MG: 10 INJECTION, POWDER, FOR SOLUTION INTRAMUSCULAR at 14:19

## 2018-01-26 RX ADMIN — DOCUSATE SODIUM 100 MG: 100 CAPSULE, LIQUID FILLED ORAL at 08:42

## 2018-01-26 RX ADMIN — LEVOTHYROXINE SODIUM 25 MCG: 25 TABLET ORAL at 05:32

## 2018-01-26 RX ADMIN — ACETAMINOPHEN 650 MG: 325 TABLET, FILM COATED ORAL at 18:01

## 2018-01-26 RX ADMIN — NITROFURANTOIN (MONOHYDRATE/MACROCRYSTALS) 100 MG: 75; 25 CAPSULE ORAL at 08:39

## 2018-01-26 RX ADMIN — CHLORHEXIDINE GLUCONATE 15 ML: 1.2 RINSE ORAL at 08:42

## 2018-01-26 RX ADMIN — DIBASIC SODIUM PHOSPHATE, MONOBASIC POTASSIUM PHOSPHATE AND MONOBASIC SODIUM PHOSPHATE 1 TABLET: 852; 155; 130 TABLET ORAL at 12:00

## 2018-01-26 RX ADMIN — BISACODYL 10 MG: 10 SUPPOSITORY RECTAL at 21:34

## 2018-01-26 RX ADMIN — POTASSIUM CHLORIDE 20 MEQ: 200 INJECTION, SOLUTION INTRAVENOUS at 08:44

## 2018-01-26 RX ADMIN — QUETIAPINE FUMARATE 25 MG: 25 TABLET ORAL at 21:31

## 2018-01-26 NOTE — PLAN OF CARE
Problem: OCCUPATIONAL THERAPY ADULT  Goal: Performs self-care activities at highest level of function for planned discharge setting  See evaluation for individualized goals  Treatment Interventions: ADL retraining, Visual perceptual retraining, Functional transfer training, UE strengthening/ROM, Endurance training, Cognitive reorientation, Patient/family training, Equipment evaluation/education, Neuromuscular reeducation, Fine motor coordination activities, Compensatory technique education, Continued evaluation, Energy conservation, Activityengagement          See flowsheet documentation for full assessment, interventions and recommendations  Limitation: Decreased ADL status, Decreased UE strength, Decreased Safe judgement during ADL, Decreased cognition, Decreased endurance, Decreased high-level ADLs, Decreased fine motor control, Decreased self-care trans, Visual deficit (BALANCE, MEDICAL STATUS)  Prognosis: Fair  Assessment: PT IS A 73 YO F ADMIT AS TRANSFER FROM hospitals  PT ADMIT S/P MULTIPLE FALLS W/ SLURRED SPEECH  PT FOUND W/ ACUTE ON CHRONIC SDH  PT W/ PMH SIGNIFICANT FOR SDH, GERD, HYPOTENSION, SEIZURES, FEMUR FX, CRANIOTOMY 12/15/17, CRANIECTOMY, LUNG LOBECTOMY, SPLENECTOMY, VISUAL IMPAIRMENT, R EYE EXOTROPIA, BRAIN TUMOR, DYSARTHRIA  PT W/ RECENT [facility] ADMISSION 12/14- 12/20 W/ D/C TO REHAB  PT D/C FROM REHAB ON 12/24/2017 PER PREFERENCE OF SPOUSE BASED ON DOCUMENTATION  PT IS FROM HOME W/ SPOUSE AND ANTICIPATE PT REQUIRES ASSIST FOR ADLS/IADLS/MOBILITY  PT W/ USE OF RW FOR MOBILITY  PT W/ MANY FALLS PTA  CURRENTLY PT REQUIRING MAX A UB ADLS, MAX A LB ADLS, MIN A X2 BED MOBILITY/FUNCTIONAL TRANSFERS AND MOD A X2 SHORT DISTANCE MOBILITY USING HHA FROM THERAPIST  PT APPEARS LIMITED 2* IMPAIRED BALANCE, ACTIVITY TOLERANCE, MEDICAL STATUS, ADL IMPAIRMENTS, BASELINE VISUAL IMPAIRMENT, GENERALIZED WEAKNESS/DECONDITIONING AS WELL AS IMPAIRED ATTENTION, INSIGHT, SAFETY, JUDGEMENT, ORIENTATION AND PROCESSING   FROM OT PERSPECTIVE, PT WILL BENEFIT FROM CONTINUED OT SERVICES IN AN INPT REHAB SETTING PENDING FURTHER PROGRESS AND MEDICAL STABILITY  WILL CONTINUE TO FOLLOW PT 3-5X/WEEK IN ORDER TO MEET THE BELOW DESCRIBED GOALS IN 10-14 DAYS        OT Discharge Recommendation: Short Term Rehab  OT - OK to Discharge:  (TO STR AT THIS TIME)

## 2018-01-26 NOTE — PLAN OF CARE
Problem: SLP ADULT - SWALLOWING, IMPAIRED  Goal: Advance to least restrictive diet without signs or symptoms of aspiration for planned discharge setting  See evaluation for individualized goals  1   Pt will tolerate dysphagia level 2 diet with thin liquids without overt difficulty or s/s aspiration  2   Pt will tolerate trials of dysphagia level 3 diet without overt difficulty or s/s aspiration  3   Pt will tolerate least restrictive diet w/out s/s aspiration or oral/pharyngeal difficulties        Outcome: Progressing

## 2018-01-26 NOTE — PLAN OF CARE
Problem: SLP ADULT - THOUGHT PROCESS, DISTURBED  Goal: Initial thought process eval performed  Outcome: Progressing

## 2018-01-26 NOTE — SPEECH THERAPY NOTE
Speech Language/Pathology    Speech/Language Pathology Progress Note    Patient Name: Natasha Maldonado  ACOMM'I Date: 1/26/2018     Problem List  Patient Active Problem List   Diagnosis    [facility]    Weakness of extremity    Hyponatremia    [facility]subarachnoid hemorrhage) [facility]    SAH [facility]arachnoid hemorrhage) [facility]    Lacunar infarct, acute ([facility] [facility] )    Left-sided weakness    Dysarthria    Debility    Subdural hematoma (HCC)    GERD (gastroesophageal reflux disease)    Delirium    Cognitive impairment    Ambulatory dysfunction    Fall    Physical deconditioning    Visual impairment    Dysphagia    Urinary tract infection        Past Medical History  Past Medical History:   Diagnosis Date    Chronic sinusitis     GERD (gastroesophageal reflux disease)     Hypotension     Seizures ([facility] [address] )         Past Surgical History  Past Surgical History:   Procedure Laterality Date    ABDOMINAL SURGERY      BRAIN SURGERY      CLOSED REDUCTION DISTAL FEMUR FRACTURE      CRANIOTOMY      CRANIOTOMY Left 12/15/2017    Procedure: Left fronto-temporal craniotomy for evacuation of subdural hematoma and membrane stripping;  Surgeon: Ludy Kaur MD;  [facility]: [facility];  Service: Neurosurgery    CRANIOTOMY Left 1/24/2018    Procedure: CRANIOTOMY FOR EVACUATION OF SUBDURAL HEMATOMA;  Surgeon: Ludy Kaur MD;  Location: [facility];  Service: Neurosurgery    HYSTERECTOMY      HYSTERECTOMY      LUNG LOBECTOMY Right     OOPHORECTOMY      SPLENECTOMY           Subjective:  Pt awake and sitting upright in bed, restraints on,  present  Objective:  Pt seen for ongoing dysphagia therapy  Pt's lunch tray in room  Pt fed by SLP and tolerated level 2 turkey, mac & cheese, lemon meringue pie and thin water/coffee via straw sips  Pt demonstrated mildly prolonged mastication of solid foods c noted oral residue p the swallow  When given liquid wash, oral residue eliminated   Due to prolonged mastication and increased residue, pt not appropriate for trial of level 3 foods       Assessment:  Pt tolerated level 2 lunch c thin liquids c cyclical eating/drinking strategy    Plan/Recommendations:  Cont level 2 c thin liquids  Cont alternate food/liquid

## 2018-01-26 NOTE — OCCUPATIONAL THERAPY NOTE
[address]  Evaluation     Patient Name: Alfredo Brooks  OKBQM'A Date: 1/26/2018  Problem List  Patient Active Problem List   Diagnosis    [facility]    Weakness of extremity    Hyponatremia    [facility]    [facility]) [facility]    Lacunar infarct, acute ([facility]    Left-sided weakness    Dysarthria    Debility    Subdural hematoma (HCC)    GERD (gastroesophageal reflux disease)    Delirium    Cognitive impairment    Ambulatory dysfunction    Fall    Physical deconditioning    Visual impairment    Dysphagia    Urinary tract infection     Past Medical History  Past Medical History:   Diagnosis Date    Chronic sinusitis     GERD (gastroesophageal reflux disease)     Hypotension     Seizures ([facility]      Past Surgical History  Past Surgical History:   Procedure Laterality Date    ABDOMINAL SURGERY      BRAIN SURGERY      CLOSED REDUCTION DISTAL FEMUR FRACTURE      CRANIOTOMY      CRANIOTOMY Left 12/15/2017    Procedure: Left fronto-temporal craniotomy for evacuation of subdural hematoma and membrane stripping;  Surgeon: Maddi Holley MD;  [facility];  Service: Neurosurgery    CRANIOTOMY Left 1/24/2018    Procedure: CRANIOTOMY FOR EVACUATION OF SUBDURAL HEMATOMA;  Surgeon: Maddi Holley MD;  [facility];  Service: Neurosurgery    HYSTERECTOMY      HYSTERECTOMY      LUNG LOBECTOMY Right     OOPHORECTOMY      SPLENECTOMY                   01/26/18 1145   Note Type   Note type Eval/Treat   Restrictions/Precautions   Weight Bearing Precautions Per Order No   Other Precautions Fall Risk;Pain;Cognitive; Bed Alarm;Multiple lines;Telemetry; Visual impairment; Impulsive; Restraints;Aspiration;Agitated  (POSEY BELT + B/L WRIST RESTRAINTS)   Pain Assessment   Pain Assessment No/denies pain   Pain Score No Pain   Home Living   Type of Home House   Home Layout Two level;Bed/bath upstairs;Stairs to enter with rails  ([address])   [address]   Prior Function   Level of [place] Needs assistance with ADLs and functional mobility; Needs assistance with IADLs   Lives With Spouse   Receives Help From Family   ADL Assistance Needs assistance   IADLs Needs assistance   Falls in the last 6 months 5 to 10   Vocational Retired   Lifestyle   [facility] PT 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  Intrinsic Gratification PT ENJOYS WATCHING TV  Psychosocial   Psychosocial (WDL) X   Patient Behaviors/Mood Irritable   Subjective   Subjective "I CAN'T WALK"   ADL   Where Assessed Edge of bed   Eating Assistance 2  Maximal Assistance   Grooming Assistance 2  Maximal Assistance   UB Bathing Assistance 2  Maximal Assistance   LB Bathing Assistance 2  Maximal Assistance   UB Dressing Assistance 2  Maximal Assistance   LB Dressing Assistance 2  Maximal [address]  2  Maximal Assistance   Bed Mobility   Rolling R 4  Minimal assistance   Additional items Assist x 1; Increased time required;Verbal cues;LE management   Rolling L 4  Minimal assistance   Additional items Assist x 1; Increased time required;Verbal cues;LE management   Supine to Sit 4  Minimal assistance   Additional items Assist x 2; Increased time required;Verbal cues;LE management   Sit to Supine 4  Minimal assistance   Additional items Assist x 2; Increased time required;Verbal cues;LE management   Transfers   Sit to Stand 4  Minimal assistance   Additional items Assist x 2; Increased time required;Verbal cues   Stand to Sit 4  Minimal assistance   Additional items Assist x 2;Increased time required;Verbal cues   Functional Mobility   Functional Mobility 3  Moderate assistance   Additional Comments ASSIST X2 HHA - FEW LATERAL STEPS AT EOB   Additional items Hand hold assistance   Balance   Static Sitting Fair -   Dynamic Sitting Poor +   Static Standing Poor   Dynamic Standing Poor -   Ambulatory Poor -   Activity Tolerance   Activity Tolerance Patient limited by fatigue;Treatment limited secondary to medical complications (Comment)   Medical Staff Made Aware WILL F/U W/ CM STU   Nurse Made Aware OKAY TO SEE PER ONI BELL   RUE Assessment   RUE Assessment X  (MILD WEAKNESS/DECONDITIONING, 4/5)   LUE Assessment   LUE Assessment X  (MILD WEAKNESS/DECONDITIONING, 4/5)   Hand Function   Gross Motor Coordination Impaired  (B/L UE PSYCHOMOTOR SLOWING)   Fine Motor Coordination Impaired  (B/L UE PSYCHOMOTOR SLOWING)   Vision - Complex Assessment   Ocular Range of Motion WFL   Head Position WDL   Additional Comments PT W/ BASELINE R EYE EXOTROPIA  Cognition   Overall Cognitive Status Impaired   Arousal/Participation Responsive   Attention Attends with cues to redirect   Orientation Level Oriented to person;Oriented to place; Disoriented to time;Disoriented to situation   Memory Decreased recall of precautions;Decreased recall of recent events;Decreased short term memory;Decreased recall of biographical information   Following Commands Follows one step commands with increased time or repetition   Comments PT ENGAGES IN MINIMAL CONVERSATION AND NOTED TO BE AGITATED AT TIMES  PT NOTED W/ IMPAIRED ORIENTATION, ATTENTION, INSIGHT, SAFETY, JUDGEMENT, ORIENTATION, PROCESSING AND RECALL  REQUIRES CUES FOR SAFETY T/O ALL FUNCTIONAL ACTIVITY  WILL REQUIRE FURTHER ASSESSMENT  Assessment   Limitation Decreased ADL status; Decreased UE strength;Decreased Safe judgement during ADL;Decreased cognition;Decreased endurance;Decreased high-level ADLs; Decreased fine motor control;Decreased self-care trans; Visual deficit  (BALANCE, MEDICAL STATUS)   Prognosis Fair   Assessment PT IS A 66 YO F ADMIT AS TRANSFER FROM [facility]  PT ADMIT S/P MULTIPLE FALLS W/ SLURRED SPEECH  PT FOUND W/ ACUTE ON CHRONIC SDH  PT W/ PMH SIGNIFICANT FOR SDH, GERD, HYPOTENSION, SEIZURES, FEMUR FX, CRANIOTOMY 12/15/17, CRANIECTOMY, LUNG LOBECTOMY, SPLENECTOMY, VISUAL IMPAIRMENT, R EYE EXOTROPIA, BRAIN TUMOR, DYSARTHRIA  PT W/ RECENT [facility] ADMISSION 12/14- 12/20 W/ D/C TO REHAB  PT D/C FROM REHAB ON 12/24/2017 PER PREFERENCE OF SPOUSE BASED ON DOCUMENTATION  PT IS FROM HOME W/ SPOUSE AND ANTICIPATE PT REQUIRES ASSIST FOR ADLS/IADLS/MOBILITY  PT W/ USE OF RW FOR MOBILITY  PT W/ MANY FALLS PTA  CURRENTLY PT REQUIRING MAX A UB ADLS, MAX A LB ADLS, MIN A X2 BED MOBILITY/FUNCTIONAL TRANSFERS AND MOD A X2 SHORT DISTANCE MOBILITY USING HHA FROM THERAPIST  PT APPEARS LIMITED 2* IMPAIRED BALANCE, ACTIVITY TOLERANCE, MEDICAL STATUS, ADL IMPAIRMENTS, BASELINE VISUAL IMPAIRMENT, GENERALIZED WEAKNESS/DECONDITIONING AS WELL AS IMPAIRED ATTENTION, INSIGHT, SAFETY, JUDGEMENT, ORIENTATION AND PROCESSING  FROM OT PERSPECTIVE, PT WILL BENEFIT FROM CONTINUED OT SERVICES IN AN INPT REHAB SETTING PENDING FURTHER PROGRESS AND MEDICAL STABILITY  WILL CONTINUE TO FOLLOW PT 3-5X/WEEK IN ORDER TO MEET THE BELOW DESCRIBED GOALS IN 10-14 DAYS  Goals   Patient Goals PT WOULD LIKE TO WATCH Chris Oliveira  [facility] Time Frame 10-14   Long Term Goal #1 PLEASE SEE BELOW DESCRIBED GOALS  Plan   Treatment Interventions ADL retraining;Visual perceptual retraining;Functional transfer training;UE strengthening/ROM; Endurance training;Cognitive reorientation;Patient/family training;Equipment evaluation/education; Neuromuscular reeducation; Fine motor coordination activities; Compensatory technique education;Continued evaluation; Energy conservation; Activityengagement   Goal Expiration Date 02/09/18   OT Frequency 3-5x/wk   Recommendation   OT Discharge Recommendation Short Term Rehab   OT - OK to Discharge (TO STR AT THIS TIME)   Barthel Index   Feeding 5   Bathing 0   Grooming Score 0   Dressing Score 0   Bladder Score 0   Bowels Score 5   Toilet Use Score 5   Transfers (Bed/Chair) Score 0   Mobility (Level Surface) Score 0   Stairs Score 0   Barthel Index Score 15   Modified [place] Scale   Modified [place] Scale 4     GOALS TO BE MET IN 10-14 DAYS:    1) Pt will increase bed mobility to SBA and transfer EOB to participate in functional activity with G tolerance and balance  2) Pt will improve functional transfers to SBA on/off all surfaces using DME PRN w/ G balance/safety including toileting  3) Pt will increase independence in all ADLS to SBA with G balance sitting upright in chair  4) Pt will complete toileting w/ SBA w/ G hygiene/thoroughness using DME PRN  5) Pt will improve activity tolerance to G for min 30 min txment sessions  6) Pt will participate in light grooming task with SBA using setup standing at sink ~3-5mins with G safety and balance  7) Pt will engage in ongoing cognitive assessment(S) w/ G participation to A w/ safe d/c planning/recommendations      8) Pt will follow 100% simple 2 step commands and be A&O x4 consistently with environmental cues to increase activity participation to G     9) Pt will engage in ongoing  assessments, screens, and activities t/o fx'l I/ADL/leisure tasks w/ G participation and mod I to A w/ adaptation and accommodations or rule out visual perceptual impairments    10) Pt will increase UB strength through therex and review of HEP with G carryover of tech and MOD I to increase strength and coordination 1 MMT     DOCUMENTATION COMPLETED BY Eliza Monsivais MS, OTR/L

## 2018-01-26 NOTE — PROGRESS NOTES
Progress Note - Critical Care   Hannah Gutierrez 67 y o  female MRN: [id number]  Unit/Bed#: ICU 09 Encounter: [id number]    Attending Physician: Tee Berry MD      ______________________________________________________________________  Assessment and Plan:   Principal Problem:    Subdural hematoma [facility])  Active Problems:    Weakness of extremity    Dysarthria    Cognitive impairment    Ambulatory dysfunction    Fall    Physical deconditioning    Visual impairment    Urinary tract infection  Resolved Problems:    * No resolved hospital problems  *        Neuro: Subdural hemorrhage - POD 2 s/p L craniotomy and drain placement  L frontal drain 54cc/24hr, L parietal drain 34cc/24hr  Both set at [address]  MAPs > 65, SBP < 160  Q1h neurochecks  Keppra for seizure proph  -  Analgesia - scheduled tylenol   -  Agitation - Seroquel 50mg qhs   -  [facility] ICU  CV: No active issues  Pulm: Encourage IS  GI: Continue scheduled colace  Dulcolax PRN  : Possible UTI - Day 4/7 macrobid  F/E/N: NS at 75 cc/hr  Replete lytes PRN  Continue dysphagia diet  ID: Day 4/7 macrobid for possible UTI  Afebrile  No leukocytosis or tachycardia  Heme: No active issues  Endo: Hypothyroidism - continue home synthroid  Msk/Skin: Frequent turns and offloading  OOB as tolerated  -  DVT proph - SCDs, hold chemical proph for now  Disposition: ICU care  Code Status: Level 1 - Full Code    Counseling / Coordination of Care  Total Critical Care time spent 45 minutes excluding procedures, teaching and family updates  ______________________________________________________________________    Chief Complaint: No complaints this AM     24 Hour Events: No events over night       ______________________________________________________________________    Physical Exam   Constitutional: Vital signs are normal  She appears cachectic  She is cooperative  She does not appear ill     HENT:   Mouth/Throat: Elisa is midline and oropharynx is clear and moist  Mucous membranes are dry  L subdural drains in place  Eyes: Conjunctivae and lids are normal  Pupils are equal, round, and reactive to light  R eye deviated to right  Cardiovascular: Normal rate, regular rhythm and normal pulses  Pulses:       Radial pulses are 2+ on the right side, and 2+ on the left side  Dorsalis pedis pulses are 2+ on the right side, and 2+ on the left side  Pulmonary/Chest: Effort normal and breath sounds normal    Abdominal: Soft  Normal appearance and bowel sounds are normal  There is no tenderness  There is no rigidity, no rebound and no guarding  Genitourinary:   Genitourinary Comments: Olvera catheter in place  Neurological: She is alert  GCS eye subscore is 4  GCS verbal subscore is 5  GCS motor subscore is 6  Patient moving all extremities briskly  Slurred speech which is unchanged  ______________________________________________________________________  Vitals:    18 2000 18 2100 18 2200 18 2300   BP: 115/51 (!) 117/47 125/52 (!) 124/21   BP Location: Right arm      Pulse: 72 66 70 84   Resp: 15 14 17 (!) 25   Temp: 98 3 °F (36 8 °C)      TempSrc: Oral      SpO2: 100% 100% 99% 99%   Weight:       Height:           Temperature:   Temp (24hrs), Av 9 °F (36 6 °C), Min:97 6 °F (36 4 °C), Max:98 3 °F (36 8 °C)    Current Temperature: 98 3 °F (36 8 °C)  Weights:   IBW: 61 6 kg    Body mass index is 21 61 kg/m²  Weight (last 2 days)     None        Hemodynamic Monitoring:  N/A     Non-Invasive/Invasive Ventilation Settings:  Respiratory    Lab Data (Last 4 hours)    None         O2/Vent Data (Last 4 hours)    None              No results found for: PHART, XNW6WRR, PO2ART, KYV4SIU, S3OCUXED, BEART, SOURCE  SpO2: SpO2: 99 %  Intake and Outputs:  I/O           P  O   480    I V  (mL/kg) 3233 8 (51 7) 900 (14 4)    IV Piggyback 500     Total Intake(mL/kg) 3733 8 (59 6) 1380 (22)    Urine (mL/kg/hr) 1868 (1 2) 2150 (1 4)    Drains 90 (0 1) 88 (0 1)    Stool 0 (0)     Total Output 1958 2238    Net +1775 8 -858          Unmeasured Urine Occurrence 2 x     Unmeasured Stool Occurrence 1 x           Nutrition:        Diet Orders            Start     Ordered    01/25/18 1317  Diet Dysphagia/Modified Consistency; Dysphagia 2-Mechanical Soft; Dysphagia 2-Mechanical Soft; Thin Liquid  Diet effective now     Question Answer Comment   Diet Type Dysphagia/Modified Consistency    Dysphagia/Modified Consistency Dysphagia 2-Mechanical Soft    Other Restriction(s): Dysphagia 2-Mechanical Soft    Liquid Modifier Thin Liquid    RD to adjust diet per protocol? No        01/25/18 1316          Labs:     Results from last 7 days  Lab Units 01/25/18 0427 01/25/18  0025 01/24/18  0541 01/23/18  1326   WBC Thousand/uL 9 36 11 89* 7 56 7 37   HEMOGLOBIN g/dL 11 4* 11 8 12 1 12 8   HEMATOCRIT % 33 7* 34 5* 34 7* 38 7   PLATELETS Thousands/uL 378 350 363 426*   NEUTROS PCT % 89*  --  61 73   MONOS PCT % 2*  --  8 7   MONO PCT MAN %  --  2*  --   --        Results from last 7 days  Lab Units 01/25/18 0427 01/25/18  0025 01/24/18  0541   SODIUM mmol/L 137 136 137   POTASSIUM mmol/L 4 0 3 9 3 5   CHLORIDE mmol/L 108 106 106   CO2 mmol/L 20* 21 25   BUN mg/dL 6 7 7   CREATININE mg/dL 0 37* 0 33* 0 43*   CALCIUM mg/dL 8 4 8 1* 8 5   GLUCOSE RANDOM mg/dL 114 119 79         Lab Results   Component Value Date    PHOS 2 8 12/18/2017    PHOS 2 8 12/16/2017        Results from last 7 days  Lab Units 01/25/18  0427 01/23/18  1653   INR  1 09 1 03   PTT seconds 28 24       0  Lab Value Date/Time   TROPONINI <0 02 12/14/2017 1954   TROPONINI 0 02 08/04/2017 1225   TROPONINI 0 16 (H) 08/02/2017 1456         ABG:No results found for: PHART, CWV5JVM, PO2ART, NJY0ESD, L0EBLMDE, BEART, SOURCE  Imaging:  I have personally reviewed pertinent reports        Micro:  No results found for: BLOODCX, Shankar Robles, SPUTUMCULTUR  Allergies: Allergies   Allergen Reactions    Acetazolamide      Other reaction(s): Unknown Allergic Reaction    Azithromycin      Other reaction(s): GI Upset    Barium Sulfate      Other reaction(s): GI Upset    Cefazolin     Loperamide      Other reaction(s): Unknown Allergic Reaction    Penicillins      Other reaction(s): Fever    Procaine Other (See Comments)     fever    Sulfa Antibiotics      fever     Medications:   Scheduled Meds:  acetaminophen 650 mg Oral Q6H   chlorhexidine 15 mL Swish [employer] Spit Q12H [address]   docusate sodium 100 mg Oral BID   levETIRAcetam 500 mg Oral Q12H [address]   levothyroxine 25 mcg Oral Early Morning   nitrofurantoin 100 mg Oral BID     Continuous Infusions:  sodium chloride 75 mL/hr Last Rate: 75 mL/hr (01/25/18 0321)   sodium chloride 75 mL/hr Last Rate: 75 mL/hr (01/25/18 1843)     PRN Meds:    bisacodyl 10 mg Daily PRN   ondansetron 4 mg Q6H PRN     VTE Pharmacologic Prophylaxis: Sequential compression device (Venodyne)   VTE Mechanical Prophylaxis: sequential compression device  Invasive lines and devices: Invasive Devices     Peripheral Intravenous Line            Peripheral IV 01/25/18 Right Forearm less than 1 day          Drain            Urethral Catheter Latex 16 Fr  1 day    Ventriculostomy/Subdural Subdural drainage catheter Left Frontal region 1 day    Ventriculostomy/Subdural Subdural drainage catheter Left Parietal region 1 day                     Portions of the record may have been created with voice recognition software  Occasional wrong word or "sound a like" substitutions may have occurred due to the inherent limitations of voice recognition software  Read the chart carefully and recognize, using context, where substitutions have occurred      Israel Garg MD

## 2018-01-26 NOTE — SPEECH THERAPY NOTE
Speech Language/Pathology  Speech/Language Pathology  Assessment    Patient Name: Janeth Davis  UCQBS'C Date: 2018     Problem List  Patient Active Problem List   Diagnosis    [facility]    Weakness of extremity    Hyponatremia    SAH ([facility]achnoid hemorrhage) [facility]    [facility]H ([facility]achnoid [facility]) [facility]    Lacunar infarct, acute ([facility]    Left-sided weakness    Dysarthria    Debility    Subdural hematoma (HCC)    GERD (gastroesophageal reflux disease)    Delirium    Cognitive impairment    Ambulatory dysfunction    Fall    Physical deconditioning    Visual impairment    Dysphagia    Urinary tract infection     Past Medical History  Past Medical History:   Diagnosis Date    Chronic sinusitis     GERD (gastroesophageal reflux disease)     Hypotension     Seizures ([facility]      Past Surgical History  Past Surgical History:   Procedure Laterality Date    ABDOMINAL SURGERY      BRAIN SURGERY      CLOSED REDUCTION DISTAL FEMUR FRACTURE      CRANIOTOMY      CRANIOTOMY Left 12/15/2017    Procedure: Left fronto-temporal craniotomy for evacuation of subdural hematoma and membrane stripping;  Surgeon: Сергей Reyes MD;  [facility];  Service: Neurosurgery    CRANIOTOMY Left 2018    Procedure: CRANIOTOMY FOR EVACUATION OF SUBDURAL HEMATOMA;  Surgeon: Сергей Reyes MD;  [facility];  Service: Neurosurgery    HYSTERECTOMY      HYSTERECTOMY      LUNG LOBECTOMY Right     OOPHORECTOMY      SPLENECTOMY       Orientation:  Name: Pt stated first/middle name accurately but reported maiden name   When given phrase completion, able to id current last name  Place: "[facility]" Able to id hospital c binary choice  Todays date: id  c binary choice    Long Term Memory:  : accurate  Age: accurate  Marital Status: accurate  Children?: accurate  Address: required max cues and binary choices to id    Short Term Memory:  When asked about previous meal (had just finished lunch) pt able to recall 2/4 items and when given binary choice for dessert, pt unable to accurately recall dessert  Pt presents c significant cognitive deficits characterized by decreased orientation, poor long term memory recall and poor short term memory recall  Pt also noted to have continued significant dysarthria from previous fall in December  Pt very easily distractible during assess and will benefit from formal cognitive linguistic evaluation when appropriate

## 2018-01-26 NOTE — PHYSICAL THERAPY NOTE
Physical Therapy Evaluation      Patient Active Problem List   Diagnosis    [facility]cere[facility]) [facility]    Weakness of extremity    Hyponatremia    [facility] hemorrhage) [facility]    [facility]H [facility]achnoid hemorrhage) [facility]    Lacunar infarct, acute (HCC)    Left-sided weakness    Dysarthria    Debility    Subdural hematoma (HCC)    GERD (gastroesophageal reflux disease)    Delirium    Cognitive impairment    Ambulatory dysfunction    Fall    Physical deconditioning    Visual impairment    Dysphagia    Urinary tract infection       Past Medical History:   Diagnosis Date    Chronic sinusitis     GERD (gastroesophageal reflux disease)     Hypotension     Seizures ([facility] )        Past Surgical History:   Procedure Laterality Date    ABDOMINAL SURGERY      BRAIN SURGERY      CLOSED REDUCTION DISTAL FEMUR FRACTURE      CRANIOTOMY      CRANIOTOMY Left 12/15/2017    Procedure: Left fronto-temporal craniotomy for evacuation of subdural hematoma and membrane stripping;  Surgeon: Zohra Romero MD;  [facility];  Service: Neurosurgery    CRANIOTOMY Left 1/24/2018    Procedure: CRANIOTOMY FOR EVACUATION OF SUBDURAL HEMATOMA;  Surgeon: Zohra Romero MD;  [facility];  Service: Neurosurgery    HYSTERECTOMY      HYSTERECTOMY      LUNG LOBECTOMY Right     OOPHORECTOMY      SPLENECTOMY        01/26/18 1141   Note Type   Note type Eval only   Pain Assessment   Pain Assessment No/denies pain   Home Living   [place] of [place] to enter with rails; Two level  (8 stairs o enter)   [address]  (unknown)   Prior Function   Level of [place] Needs assistance with IADLs; Needs assistance with ADLs and functional mobility   Lives With Spouse   Receives Help From Family   ADL Assistance Needs assistance   IADLs Needs assistance   Falls in the last 6 months 5 to 10   Comments pt was confused and disoriented   pt cuold not provide informaion regarding recent level of function  pt was at rehab prior fo some time, recenlty discharged   Restrictions/Precautions   Other Precautions Fall Risk;Visual impairment;Telemetry;Multiple lines; Restraints; Bed Alarm;Cognitive; Impulsive  (can get agitated)   General   Family/Caregiver Present No   Cognition   Overall Cognitive Status Impaired   Orientation Level Oriented to person  (variable performance)   RUE Assessment   RUE Assessment WFL   LUE Assessment   LUE Assessment WFL   RLE Assessment   RLE Assessment X  (strength 4+/5)   LLE Assessment   LLE Assessment X  (strength 4+/5)   Coordination   Movements are Fluid and Coordinated 0  (ataxic ble)   Sensation WFL   Bed Mobility   Rolling R 4  Minimal assistance   Additional items Assist x 1; Impulsive;Verbal cues;LE management   Rolling L 4  Minimal assistance   Additional items Assist x 1; Impulsive;Verbal cues;LE management   Supine to Sit 4  Minimal assistance   Additional items Assist x 1; Impulsive;Verbal cues;LE management   Sit to Supine 4  Minimal assistance   Additional items Assist x 2;Impulsive;Verbal cues;LE management   Transfers   Sit to Stand 4  Minimal assistance   Additional items Assist x 2;Impulsive;Verbal cues   Stand to Sit 4  Minimal assistance   Additional items Assist x 2;Impulsive;Verbal cues   Ambulation/Elevation   Gait pattern Narrow LUIS ANTONIO; Ataxia;Scissoring;Shuffling; Inconsistent roxi   Gait Assistance 4  Minimal assist   Additional items Assist x 2   Assistive Device Other (Comment)  (hand hold ,arm in arm)   Distance 4'    Balance   Static Sitting Fair   Dynamic Sitting Fair -   Static Standing Poor   Dynamic Standing Poor -   Ambulatory Poor -   Endurance Deficit   Endurance Deficit Yes   Endurance Deficit Description cognition   Activity Tolerance   Activity Tolerance Treatment limited secondary to medical complications (Comment)   Medical Staff Made Aware OT, CM   Nurse Made Aware yes- ting   Assessment   Prognosis Fair   Problem List Decreased endurance; Impaired balance;Decreased mobility; Decreased coordination;Decreased cognition; Impaired judgement;Decreased safety awareness; Impaired vision   Assessment pt recenly discharged from rehab for sdh, fall ad sustained recurrent sdh needin crani for evacuation of acute on chronic clot  pt refered to PT  pt was needin assist at home, using rw for amb per chart notes  pt has had numerous falls  pt demonstrated moderate to severe functional limitations due to deficis in cognition, balance, coordination, gait sequencing and stability, pt was able to mobilize in bed with min assist  stand and amb a few steps with min assist of 2 persons  pt had gait ataxia, ? apraxia  pt will need skilled PT, will need rehab  Barriers to Discharge Inaccessible home environment   Goals   Patient Goals watch judge gray   STG Expiration Date 02/09/18   Short Term Goal #1 supervision for bed mobility, min assist for transfers and amb using rw for > 150'  demonstategood safety practices  improve balance by 1/2 to 1 grade  initiate stair training  Plan   Treatment/Interventions Functional transfer training;LE strengthening/ROM; Elevations; Therapeutic exercise; Endurance training;Cognitive reorientation;Patient/family training;Equipment eval/education; Bed mobility;Gait training;Spoke to nursing;Spoke to case management;OT   PT Frequency 5x/wk   Recommendation   Recommendation Post acute IP rehab   PT - OK to Discharge No   Modified [place] Scale   Modified [place] Scale 4   Barthel Index   Feeding 5   Bathing 0   Grooming Score 0   Dressing Score 0   Bladder Score 0   Bowels Score 10   Toilet Use Score 5   Transfers (Bed/Chair) Score 5   Mobility (Level Surface) Score 0   Stairs Score 0   Barthel Index Score 25   History: co - morbidities, fall risk, use of assistive device, assist for adl's, cognition, multiple lines  Exam: impairments in locomotion, musculoskeletal, balance, cognition, cns  Clinical: unstable  Complexity:high      Idamae Linwood, PT

## 2018-01-27 LAB
ABO GROUP BLD BPU: NORMAL
ABO GROUP BLD BPU: NORMAL
ANION GAP SERPL CALCULATED.3IONS-SCNC: 6 MMOL/L (ref 4–13)
BASOPHILS # BLD AUTO: 0.03 THOUSANDS/ΜL (ref 0–0.1)
BASOPHILS NFR BLD AUTO: 0 % (ref 0–1)
BPU ID: NORMAL
BPU ID: NORMAL
BUN SERPL-MCNC: 10 MG/DL (ref 5–25)
CALCIUM SERPL-MCNC: 8.9 MG/DL (ref 8.3–10.1)
CHLORIDE SERPL-SCNC: 102 MMOL/L (ref 100–108)
CO2 SERPL-SCNC: 28 MMOL/L (ref 21–32)
CREAT SERPL-MCNC: 0.53 MG/DL (ref 0.6–1.3)
EOSINOPHIL # BLD AUTO: 0.11 THOUSAND/ΜL (ref 0–0.61)
EOSINOPHIL NFR BLD AUTO: 1 % (ref 0–6)
ERYTHROCYTE [DISTWIDTH] IN BLOOD BY AUTOMATED COUNT: 13.3 % (ref 11.6–15.1)
GFR SERPL CREATININE-BSD FRML MDRD: 94 ML/MIN/1.73SQ M
GLUCOSE SERPL-MCNC: 90 MG/DL (ref 65–140)
GLUCOSE SERPL-MCNC: 93 MG/DL (ref 65–140)
HCT VFR BLD AUTO: 35.6 % (ref 34.8–46.1)
HGB BLD-MCNC: 12.1 G/DL (ref 11.5–15.4)
LYMPHOCYTES # BLD AUTO: 1.5 THOUSANDS/ΜL (ref 0.6–4.47)
LYMPHOCYTES NFR BLD AUTO: 15 % (ref 14–44)
MCH RBC QN AUTO: 30 PG (ref 26.8–34.3)
MCHC RBC AUTO-ENTMCNC: 34 G/DL (ref 31.4–37.4)
MCV RBC AUTO: 88 FL (ref 82–98)
MONOCYTES # BLD AUTO: 0.69 THOUSAND/ΜL (ref 0.17–1.22)
MONOCYTES NFR BLD AUTO: 7 % (ref 4–12)
NEUTROPHILS # BLD AUTO: 7.54 THOUSANDS/ΜL (ref 1.85–7.62)
NEUTS SEG NFR BLD AUTO: 77 % (ref 43–75)
NRBC BLD AUTO-RTO: 0 /100 WBCS
PHOSPHATE SERPL-MCNC: 1.6 MG/DL (ref 2.3–4.1)
PLATELET # BLD AUTO: 429 THOUSANDS/UL (ref 149–390)
PMV BLD AUTO: 8.4 FL (ref 8.9–12.7)
POTASSIUM SERPL-SCNC: 3.4 MMOL/L (ref 3.5–5.3)
RBC # BLD AUTO: 4.03 MILLION/UL (ref 3.81–5.12)
SODIUM SERPL-SCNC: 136 MMOL/L (ref 136–145)
UNIT DISPENSE STATUS: NORMAL
UNIT DISPENSE STATUS: NORMAL
UNIT PRODUCT CODE: NORMAL
UNIT PRODUCT CODE: NORMAL
UNIT RH: NORMAL
UNIT RH: NORMAL
WBC # BLD AUTO: 9.9 THOUSAND/UL (ref 4.31–10.16)

## 2018-01-27 PROCEDURE — 82948 REAGENT STRIP/BLOOD GLUCOSE: CPT

## 2018-01-27 PROCEDURE — 99232 SBSQ HOSP IP/OBS MODERATE 35: CPT | Performed by: SURGERY

## 2018-01-27 PROCEDURE — 85025 COMPLETE CBC W/AUTO DIFF WBC: CPT | Performed by: EMERGENCY MEDICINE

## 2018-01-27 PROCEDURE — 84100 ASSAY OF PHOSPHORUS: CPT | Performed by: EMERGENCY MEDICINE

## 2018-01-27 PROCEDURE — 80048 BASIC METABOLIC PNL TOTAL CA: CPT | Performed by: EMERGENCY MEDICINE

## 2018-01-27 RX ORDER — HALOPERIDOL 5 MG/ML
2.5 INJECTION INTRAMUSCULAR EVERY 6 HOURS PRN
Status: DISCONTINUED | OUTPATIENT
Start: 2018-01-27 | End: 2018-01-31 | Stop reason: HOSPADM

## 2018-01-27 RX ORDER — POTASSIUM CHLORIDE 20 MEQ/1
20 TABLET, EXTENDED RELEASE ORAL
Status: COMPLETED | OUTPATIENT
Start: 2018-01-27 | End: 2018-01-27

## 2018-01-27 RX ORDER — HALOPERIDOL 5 MG/ML
INJECTION INTRAMUSCULAR
Status: COMPLETED
Start: 2018-01-27 | End: 2018-01-27

## 2018-01-27 RX ORDER — ACETAMINOPHEN 325 MG/1
650 TABLET ORAL EVERY 6 HOURS PRN
Status: DISCONTINUED | OUTPATIENT
Start: 2018-01-27 | End: 2018-01-31 | Stop reason: HOSPADM

## 2018-01-27 RX ORDER — QUETIAPINE FUMARATE 25 MG/1
50 TABLET, FILM COATED ORAL
Status: DISCONTINUED | OUTPATIENT
Start: 2018-01-27 | End: 2018-01-30

## 2018-01-27 RX ORDER — QUETIAPINE FUMARATE 25 MG/1
25 TABLET, FILM COATED ORAL ONCE
Status: DISCONTINUED | OUTPATIENT
Start: 2018-01-27 | End: 2018-01-27

## 2018-01-27 RX ADMIN — NITROFURANTOIN (MONOHYDRATE/MACROCRYSTALS) 100 MG: 75; 25 CAPSULE ORAL at 20:19

## 2018-01-27 RX ADMIN — QUETIAPINE FUMARATE 50 MG: 25 TABLET ORAL at 21:30

## 2018-01-27 RX ADMIN — HALOPERIDOL LACTATE 2.5 MG: 5 INJECTION, SOLUTION INTRAMUSCULAR at 11:56

## 2018-01-27 RX ADMIN — POTASSIUM CHLORIDE 20 MEQ: 1500 TABLET, EXTENDED RELEASE ORAL at 12:02

## 2018-01-27 RX ADMIN — LEVETIRACETAM 500 MG: 500 TABLET ORAL at 08:01

## 2018-01-27 RX ADMIN — ACETAMINOPHEN 650 MG: 325 TABLET, FILM COATED ORAL at 01:52

## 2018-01-27 RX ADMIN — POTASSIUM CHLORIDE 20 MEQ: 1500 TABLET, EXTENDED RELEASE ORAL at 17:43

## 2018-01-27 RX ADMIN — ACETAMINOPHEN 650 MG: 325 TABLET, FILM COATED ORAL at 06:34

## 2018-01-27 RX ADMIN — NITROFURANTOIN (MONOHYDRATE/MACROCRYSTALS) 100 MG: 75; 25 CAPSULE ORAL at 08:02

## 2018-01-27 RX ADMIN — DIBASIC SODIUM PHOSPHATE, MONOBASIC POTASSIUM PHOSPHATE AND MONOBASIC SODIUM PHOSPHATE 2 TABLET: 852; 155; 130 TABLET ORAL at 17:47

## 2018-01-27 RX ADMIN — DIBASIC SODIUM PHOSPHATE, MONOBASIC POTASSIUM PHOSPHATE AND MONOBASIC SODIUM PHOSPHATE 2 TABLET: 852; 155; 130 TABLET ORAL at 08:02

## 2018-01-27 RX ADMIN — LEVETIRACETAM 500 MG: 500 TABLET ORAL at 21:30

## 2018-01-27 RX ADMIN — LEVOTHYROXINE SODIUM 25 MCG: 25 TABLET ORAL at 06:34

## 2018-01-27 RX ADMIN — POTASSIUM CHLORIDE 20 MEQ: 1500 TABLET, EXTENDED RELEASE ORAL at 08:02
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
## 2018-01-27 NOTE — PROGRESS NOTES
Progress Note - ICU Transfer to Step down/med  surg  - Michelle Tam Gutierrez 67 y o  female MRN: [id number]    Unit/Bed#: ICU 09 Encounter: [id number]      Code Status: Level 1 - Full Code    Date of ICU admission: 1/23/18    Reason for ICU adm: Acute on chronic subdural hematoma    Accepting:  Spoke with Jeremy Son  Active problems:   Patient Active Problem List   Diagnosis    [facility]    Weakness of extremity    Hyponatremia    [facility]oid [facility]) [facility]    SAH (subarachnoid hemorrhage) (HCC)    Lacunar infarct, acute (HCC)    Left-sided weakness    Dysarthria    Debility    Subdural hematoma (HCC)    GERD (gastroesophageal reflux disease)    Delirium    Cognitive impairment    Ambulatory dysfunction    Fall    Physical deconditioning    Visual impairment    Dysphagia    Urinary tract infection       Summary of clinical course:  A 65-year-old female with a history of frequent falls, subdural hematomas, hypothyroidism who presented on January 23rd after falling  She initially presented to [facility] and was found to have acute on chronic subdural hematomas  She was ultimately transferred to [place]  She was noted to have new onset slurred speech  The patient was seen by Neurosurgery and had a left craniotomy with 2 drains placed on January 24th  She had the drains removed, the last of which was today (1/27/18)  The patient was started on Keppra for seizure prophylaxis  She was also started on 5 days of Macrobid for an incidental urinary tract infection found on admission  The patient is intermittently confused and agitated  She was started on 50 mg of Seroquel at night and p r n  Haldol  Follow-up in 2 weeks as an outpatient with Neurosurgery      Recent or scheduled procedures: N/A    Outstanding/pending diagnostics: N/A    Hospital discharge planning:  Rehab

## 2018-01-27 NOTE — PROGRESS NOTES
Progress Note - Critical Care   Geetha Gutierrez 67 y o  female MRN: [id number]  Unit/Bed#: ICU 09 Encounter: [id number]    Attending Physician: Jesus Fuentes MD      ______________________________________________________________________  Assessment and Plan:   Principal Problem:    Subdural hematoma [facility])  Active Problems:    Weakness of extremity    Dysarthria    Cognitive impairment    Ambulatory dysfunction    Fall    Physical deconditioning    Visual impairment    Urinary tract infection  Resolved Problems:    * No resolved hospital problems  *        Neuro: Subdural hemorrhage - S/p L craniotomy and drain placement on 1/24  L frontal drain removed 1/26, L parietal drain 8cc/24hr  Set at [address]  MAPs > 65, SBP < 160  Q1h neurochecks  Keppra for seizure proph  -  Analgesia - scheduled tylenol   -  Agitation - Seroquel 25mg qhs  Consider increasing dose of seroquel   -  [facility] ICU      CV: No active issues      Pulm: Encourage IS      GI: Continue scheduled colace  Dulcolax PRN      : Possible UTI - Day 4/7 macrobid      F/E/N: Replete lytes PRN  Continue dysphagia diet      ID: Day 4/5 macrobid for possible UTI  Afebrile  No leukocytosis or tachycardia      Heme: No active issues      Endo: Hypothyroidism - continue home synthroid  Msk/Skin: Frequent turns and offloading  OOB as tolerated  -  DVT proph - SCDs, hold chemical proph for now      Disposition: ICU care while EVDs in place  Code Status: Level 1 - Full Code    Counseling / Coordination of Care  Total Critical Care time spent 30 minutes excluding procedures, teaching and family updates  ______________________________________________________________________    Chief Complaint: No complaints this AM     24 Hour Events: Remains intermittently confused and combative  Given 2 5 mg zyprex over night without success        ______________________________________________________________________    Physical Exam Constitutional: Vital signs are normal  She appears cachectic  She is cooperative  She does not appear ill  HENT:   Mouth/Throat: Uvula is midline and oropharynx is clear and moist  Mucous membranes are dry  L subdural drain in place  Eyes: Conjunctivae and lids are normal  Pupils are equal, round, and reactive to light  R eye deviated to right  Cardiovascular: Normal rate, regular rhythm and normal pulses  Pulses:       Radial pulses are 2+ on the right side, and 2+ on the left side  Dorsalis pedis pulses are 2+ on the right side, and 2+ on the left side  Pulmonary/Chest: Effort normal and breath sounds normal    Abdominal: Soft  Normal appearance and bowel sounds are normal  There is no tenderness  There is no rigidity, no rebound and no guarding  Genitourinary:   Genitourinary Comments: Olvera catheter in place  Neurological: She is alert  GCS eye subscore is 4  GCS verbal subscore is 5  GCS motor subscore is 6  Patient moving all extremities briskly  Slurred speech which is unchanged  ______________________________________________________________________  Vitals:    18 0200 18 0258 18 0400 18 0534   BP: 126/61   135/54   BP Location:    Right arm   Pulse: 90 82 84 86   Resp: 18 21 21 19   Temp:   97 6 °F (36 4 °C)    TempSrc:   Oral    SpO2: 98% 99% 97% 98%   Weight:       Height:           Temperature:   Temp (24hrs), Av 9 °F (36 6 °C), Min:97 6 °F (36 4 °C), Max:98 2 °F (36 8 °C)    Current Temperature: 97 6 °F (36 4 °C)  Weights:   IBW: 61 6 kg    Body mass index is 21 61 kg/m²    Weight (last 2 days)     None        Hemodynamic Monitoring:  N/A     Non-Invasive/Invasive Ventilation Settings:  Respiratory    Lab Data (Last 4 hours)    None         O2/Vent Data (Last 4 hours)    None              No results found for: PHART, FRO9HCJ, PO2ART, HDJ6XNK, O4VLWEFZ, BEART, SOURCE  SpO2: SpO2: 98 %  Intake and Outputs:  I/O       701 -  07 01/26 0701 - 01/27 0700    P  O  680 1220    I V  (mL/kg) 1800 (28 8) 640 (10 2)    IV Piggyback  100    Total Intake(mL/kg) 2480 (39 6) 1960 (31 3)    Urine (mL/kg/hr) 3325 (2 2) 675 (0 4)    Drains 98 (0 1) 8 (0)    Stool  0 (0)    Total Output 3423 683    Net -943 +1277          Unmeasured Urine Occurrence  3 x    Unmeasured Stool Occurrence  6 x           Nutrition:        Diet Orders            Start     Ordered    01/25/18 1317  Diet Dysphagia/Modified Consistency; Dysphagia 2-Mechanical Soft; Dysphagia 2-Mechanical Soft; Thin Liquid  Diet effective now     Question Answer Comment   Diet Type Dysphagia/Modified Consistency    Dysphagia/Modified Consistency Dysphagia 2-Mechanical Soft    Other Restriction(s): Dysphagia 2-Mechanical Soft    Liquid Modifier Thin Liquid    RD to adjust diet per protocol?  No        01/25/18 1316          Labs:     Results from last 7 days  Lab Units 01/26/18  0525 01/25/18  0427 01/25/18  0025 01/24/18  0541   WBC Thousand/uL 9 58 9 36 11 89* 7 56   HEMOGLOBIN g/dL 11 5 11 4* 11 8 12 1   HEMATOCRIT % 34 4* 33 7* 34 5* 34 7*   PLATELETS Thousands/uL 392* 378 350 363   NEUTROS PCT % 69 89*  --  61   MONOS PCT % 9 2*  --  8   MONO PCT MAN %  --   --  2*  --        Results from last 7 days  Lab Units 01/26/18  0525 01/25/18  0427 01/25/18  0025   SODIUM mmol/L 137 137 136   POTASSIUM mmol/L 3 4* 4 0 3 9   CHLORIDE mmol/L 105 108 106   CO2 mmol/L 26 20* 21   BUN mg/dL 6 6 7   CREATININE mg/dL 0 47* 0 37* 0 33*   CALCIUM mg/dL 8 5 8 4 8 1*   GLUCOSE RANDOM mg/dL 84 114 119       Results from last 7 days  Lab Units 01/26/18  0525   MAGNESIUM mg/dL 2 1     Lab Results   Component Value Date    PHOS 1 6 (L) 01/27/2018    PHOS 2 2 (L) 01/26/2018    PHOS 2 8 12/18/2017        Results from last 7 days  Lab Units 01/26/18  0525 01/25/18  0427 01/23/18  1653   INR   --  1 09 1 03   PTT seconds 27 28 24       0  Lab Value Date/Time   TROPONINI <0 02 12/14/2017 1954   TROPONINI 0 02 08/04/2017 1225 TROPONINI 0 16 (H) 08/02/2017 1456         ABG:No results found for: PHART, OTW5LNU, PO2ART, BRQ4VCS, E3RKCYUB, BEART, SOURCE  Imaging:  I have personally reviewed pertinent reports  Micro:  No results found for: Myrla Haw, SPUTUMCULTUR  Allergies: Allergies   Allergen Reactions    Acetazolamide      Other reaction(s): Unknown Allergic Reaction    Azithromycin      Other reaction(s): GI Upset    Barium Sulfate      Other reaction(s): GI Upset    Cefazolin     Loperamide      Other reaction(s): Unknown Allergic Reaction    Penicillins      Other reaction(s): Fever    Procaine Other (See Comments)     fever    Sulfa Antibiotics      fever     Medications:   Scheduled Meds:  acetaminophen 650 mg Oral Q6H   docusate sodium 100 mg Oral BID   levETIRAcetam 500 mg Oral Q12H [address]   levothyroxine 25 mcg Oral Early Morning   nitrofurantoin 100 mg Oral BID   QUEtiapine 25 mg Oral HS   QUEtiapine 25 mg Oral Once     Continuous Infusions:   PRN Meds:    bisacodyl 10 mg Daily PRN   ondansetron 4 mg Q6H PRN     VTE Pharmacologic Prophylaxis: Sequential compression device (Venodyne)   VTE Mechanical Prophylaxis: sequential compression device  Invasive lines and devices: Invasive Devices     Peripheral Intravenous Line            Peripheral IV 01/25/18 Right Forearm 1 day          Drain            Ventriculostomy/Subdural Subdural drainage catheter Left Parietal region 2 days                     Portions of the record may have been created with voice recognition software  Occasional wrong word or "sound a like" substitutions may have occurred due to the inherent limitations of voice recognition software  Read the chart carefully and recognize, using context, where substitutions have occurred      Lotus Merida MD

## 2018-01-27 NOTE — PLAN OF CARE
Problem: Potential for Falls  Goal: Patient will remain free of falls  INTERVENTIONS:  - Assess patient frequently for physical needs  -  Identify cognitive and physical deficits and behaviors that affect risk of falls    -  [place] fall precautions as indicated by assessment   - Educate patient/family on patient safety including physical limitations  - Instruct patient to call for assistance with activity based on assessment  - Modify environment to reduce risk of injury  - Consider OT/PT consult to assist with strengthening/mobility   Outcome: Progressing      Problem: Prexisting or High Potential for Compromised Skin Integrity  Goal: Skin integrity is maintained or improved  INTERVENTIONS:  - Identify patients at risk for skin breakdown  - Assess and monitor skin integrity  - Assess and monitor nutrition and hydration status  - Monitor labs (i e  albumin)  - Assess for incontinence   - Turn and reposition patient  - Assist with mobility/ambulation  - Relieve pressure over bony prominences  - Avoid friction and shearing  - Provide appropriate hygiene as needed including keeping skin clean and dry  - Evaluate need for skin moisturizer/barrier cream  - Collaborate with interdisciplinary team (i e  Nutrition, Rehabilitation, etc )   - Patient/family teaching   Outcome: Progressing      Problem: PAIN - ADULT  Goal: Verbalizes/displays adequate comfort level or baseline comfort level  Interventions:  - Encourage patient to monitor pain and request assistance  - Assess pain using appropriate pain scale  - Administer analgesics based on type and severity of pain and evaluate response  - Implement non-pharmacological measures as appropriate and evaluate response  - Consider cultural and social influences on pain and pain management  - Notify physician/advanced practitioner if interventions unsuccessful or patient reports new pain   Outcome: Progressing      Problem: INFECTION - ADULT  Goal: Absence or prevention of progression during hospitalization  INTERVENTIONS:  - Assess and monitor for signs and symptoms of infection  - Monitor lab/diagnostic results  - Monitor all insertion sites, i e  indwelling lines, tubes, and drains  - Monitor endotracheal (as able) and nasal secretions for changes in amount and color  - [place] appropriate cooling/warming therapies per order  - Administer medications as ordered  - Instruct and encourage patient and family to use good hand hygiene technique  - Identify and instruct in appropriate isolation precautions for identified infection/condition   Outcome: Progressing    Goal: Absence of fever/infection during neutropenic period  INTERVENTIONS:  - Monitor WBC  - Implement neutropenic guidelines   Outcome: Progressing      Problem: SAFETY ADULT  Goal: Maintain or return to baseline ADL function  INTERVENTIONS:  -  Assess patient's ability to carry out ADLs; assess patient's baseline for ADL function and identify physical deficits which impact ability to perform ADLs (bathing, care of mouth/teeth, toileting, grooming, dressing, etc )  - Assess/evaluate cause of self-care deficits   - Assess range of motion  - Assess patient's mobility; develop plan if impaired  - Assess patient's need for assistive devices and provide as appropriate  - Encourage maximum independence but intervene and supervise when necessary  ¯ Involve family in performance of ADLs  ¯ Assess for home care needs following discharge   ¯ Request OT consult to assist with ADL evaluation and planning for discharge  ¯ Provide patient education as appropriate   Outcome: Progressing    Goal: Maintain or return mobility status to optimal level  INTERVENTIONS:  - Assess patient's baseline mobility status (ambulation, transfers, stairs, etc )    - Identify cognitive and physical deficits and behaviors that affect mobility  - Identify mobility aids required to assist with transfers and/or ambulation (gait belt, sit-to-stand, lift, walker, cane, etc )  - [place] fall precautions as indicated by assessment  - Record patient progress and toleration of activity level on Mobility SBAR; progress patient to next Phase/Stage  - Instruct patient to call for assistance with activity based on assessment  - Request Rehabilitation consult to assist with strengthening/weightbearing, etc    Outcome: Progressing      Problem: DISCHARGE PLANNING  Goal: Discharge to home or other facility with appropriate resources  INTERVENTIONS:  - Identify barriers to discharge w/patient and caregiver  - Arrange for needed discharge resources and transportation as appropriate  - Identify discharge learning needs (meds, wound care, etc )  - Arrange for interpretive services to assist at discharge as needed  - Refer to Case Management Department for coordinating discharge planning if the patient needs post-hospital services based on physician/advanced practitioner order or complex needs related to functional status, cognitive ability, or social support system   Outcome: Progressing      Problem: Knowledge Deficit  Goal: Patient/family/caregiver demonstrates understanding of disease process, treatment plan, medications, and discharge instructions  Complete learning assessment and assess knowledge base    Interventions:  - Provide teaching at level of understanding  - Provide teaching via preferred learning methods   Outcome: Progressing      Problem: SAFETY,RESTRAINT: NV/NON-SELF DESTRUCTIVE BEHAVIOR  Goal: Remains free of harm/injury (restraint for non violent/non self-detsructive behavior)  INTERVENTIONS:  - Instruct patient/family regarding restraint use   - Assess and monitor physiologic and psychological status   - Provide interventions and comfort measures to meet assessed patient needs   - Identify and implement measures to help patient regain control  - Assess readiness for release of restraint    Outcome: Progressing    Goal: Returns to optimal restraint-free functioning  INTERVENTIONS:  - Assess the patient's behavior and symptoms that indicate continued need for restraint  - Identify and implement measures to help patient regain control  - Assess readiness for release of restraint    Outcome: Progressing      Problem: Nutrition/Hydration-ADULT  Goal: Nutrient/Hydration intake appropriate for improving, restoring or maintaining nutritional needs  Monitor and assess patient's nutrition/hydration status for malnutrition (ex- brittle hair, bruises, dry skin, pale skin and conjunctiva, muscle wasting, smooth red tongue, and disorientation)  Collaborate with interdisciplinary team and initiate plan and interventions as ordered  Monitor patient's weight and dietary intake as ordered or per policy  Utilize nutrition screening tool and intervene per policy  Determine patient's food preferences and provide high-protein, high-caloric foods as appropriate       INTERVENTIONS:  - Monitor oral intake, urinary output, labs, and treatment plans  - Assess nutrition and hydration status and recommend course of action  - Evaluate amount of meals eaten  - Assist patient with eating if necessary   - Allow adequate time for meals  - Recommend/ encourage appropriate diets, oral nutritional supplements, and vitamin/mineral supplements  - Order, calculate, and assess calorie counts as needed  - Recommend, monitor, and adjust tube feedings and TPN/PPN based on assessed needs  - Assess need for intravenous fluids  - Provide specific nutrition/hydration education as appropriate  - Include patient/family/caregiver in decisions related to nutrition    Outcome: Progressing

## 2018-01-27 NOTE — PROGRESS NOTES
Progress Note - Neurosurgery   Jeanmarie Higginbotham 67 y o  female MRN: [id number]  Unit/Bed#: ICU 09 Encounter: [id number]    Assessment:  79-year-old woman postop day 3 left-sided craniotomy for evacuation of subdural hematoma  Minimal drainage from subdural drain  Neurological examination is stable  A total of 15 min was spent with the patient, of which more than 50% was spent in direct counseling coordination of care  Plan:  1  Continue to monitor neurological examination closely  2   Left subdural drain removed without difficulty  Monocryl suture in place  No change in neurological examination  3   Hold antiplatelet/anticoagulation medication  SCDs for DVT prophylaxis  4   Neurosurgery will sign off  Follow-up in 2 weeks as outpatient with repeat CT scan of the head  Please contact service if there are any questions or concerns  VTE Prophylaxis: Sequential compression device Hugo Davila)     Subjective/Objective   Chief Complaint:  "Where is my ?"    Vitals: Blood pressure (!) 153/45, pulse 86, temperature 97 6 °F (36 4 °C), temperature source Oral, resp  rate (!) 28, height 5' 7" (1 702 m), weight 62 6 kg (137 lb 15 8 oz), SpO2 98 %, not currently breastfeeding  ,Body mass index is 21 61 kg/m²  Hemodynamic Monitoring: None  Physical Exam:   Resting comfortably an ICU bed 9  Left frontal incision clean and dry  Obeys in upper and lower extremities  Grossly full power in upper lower extremities  No sensory extinction or neglect  Slurred speech with confusion  Oriented to person but not place or time  Minimal drainage from left subdural drain  Removed without difficulty  Invasive Devices     Peripheral Intravenous Line            Peripheral IV 01/25/18 Right Forearm 1 day          Drain            Ventriculostomy/Subdural Subdural drainage catheter Left Parietal region 2 days                          Lab Results:   I have personally reviewed pertinent results      Lab Results Component Value Date    WBC 9 90 01/27/2018    HGB 12 1 01/27/2018    HCT 35 6 01/27/2018    MCV 88 01/27/2018     (H) 01/27/2018    MCH 30 0 01/27/2018    MCHC 34 0 01/27/2018    RDW 13 3 01/27/2018    MPV 8 4 (L) 01/27/2018    NRBC 0 01/27/2018     01/27/2018     01/27/2018    CO2 28 01/27/2018    ANIONGAP 6 01/27/2018    BUN 10 01/27/2018    CREATININE 0 53 (L) 01/27/2018    GLUCOSE 90 01/27/2018    CALCIUM 8 9 01/27/2018    EGFR 94 01/27/2018       Imaging Studies: I have personally reviewed pertinent reports  and I have personally reviewed pertinent films in PACS    Other Studies:  None

## 2018-01-28 LAB
ANION GAP SERPL CALCULATED.3IONS-SCNC: 6 MMOL/L (ref 4–13)
BUN SERPL-MCNC: 13 MG/DL (ref 5–25)
CALCIUM SERPL-MCNC: 8.8 MG/DL (ref 8.3–10.1)
CHLORIDE SERPL-SCNC: 108 MMOL/L (ref 100–108)
CO2 SERPL-SCNC: 26 MMOL/L (ref 21–32)
CREAT SERPL-MCNC: 0.51 MG/DL (ref 0.6–1.3)
ERYTHROCYTE [DISTWIDTH] IN BLOOD BY AUTOMATED COUNT: 13.8 % (ref 11.6–15.1)
GFR SERPL CREATININE-BSD FRML MDRD: 96 ML/MIN/1.73SQ M
GLUCOSE SERPL-MCNC: 85 MG/DL (ref 65–140)
HCT VFR BLD AUTO: 32.9 % (ref 34.8–46.1)
HGB BLD-MCNC: 11 G/DL (ref 11.5–15.4)
MAGNESIUM SERPL-MCNC: 2.2 MG/DL (ref 1.6–2.6)
MCH RBC QN AUTO: 30.1 PG (ref 26.8–34.3)
MCHC RBC AUTO-ENTMCNC: 33.4 G/DL (ref 31.4–37.4)
MCV RBC AUTO: 90 FL (ref 82–98)
PHOSPHATE SERPL-MCNC: 4.1 MG/DL (ref 2.3–4.1)
PLATELET # BLD AUTO: 410 THOUSANDS/UL (ref 149–390)
PMV BLD AUTO: 8.9 FL (ref 8.9–12.7)
POTASSIUM SERPL-SCNC: 4.2 MMOL/L (ref 3.5–5.3)
RBC # BLD AUTO: 3.66 MILLION/UL (ref 3.81–5.12)
SODIUM SERPL-SCNC: 140 MMOL/L (ref 136–145)
WBC # BLD AUTO: 6.88 THOUSAND/UL (ref 4.31–10.16)

## 2018-01-28 PROCEDURE — 82746 ASSAY OF FOLIC ACID SERUM: CPT | Performed by: PHYSICIAN ASSISTANT

## 2018-01-28 PROCEDURE — 84443 ASSAY THYROID STIM HORMONE: CPT | Performed by: PHYSICIAN ASSISTANT

## 2018-01-28 PROCEDURE — 85027 COMPLETE CBC AUTOMATED: CPT | Performed by: PHYSICIAN ASSISTANT

## 2018-01-28 PROCEDURE — 83735 ASSAY OF MAGNESIUM: CPT | Performed by: PHYSICIAN ASSISTANT

## 2018-01-28 PROCEDURE — 80076 HEPATIC FUNCTION PANEL: CPT | Performed by: PHYSICIAN ASSISTANT

## 2018-01-28 PROCEDURE — 80048 BASIC METABOLIC PNL TOTAL CA: CPT | Performed by: PHYSICIAN ASSISTANT

## 2018-01-28 PROCEDURE — 97535 SELF CARE MNGMENT TRAINING: CPT

## 2018-01-28 PROCEDURE — 84100 ASSAY OF PHOSPHORUS: CPT | Performed by: PHYSICIAN ASSISTANT

## 2018-01-28 RX ADMIN — NITROFURANTOIN (MONOHYDRATE/MACROCRYSTALS) 100 MG: 75; 25 CAPSULE ORAL at 12:07

## 2018-01-28 RX ADMIN — QUETIAPINE FUMARATE 50 MG: 25 TABLET ORAL at 21:02

## 2018-01-28 RX ADMIN — DOCUSATE SODIUM 100 MG: 100 CAPSULE, LIQUID FILLED ORAL at 10:03

## 2018-01-28 RX ADMIN — DOCUSATE SODIUM 100 MG: 100 CAPSULE, LIQUID FILLED ORAL at 17:32

## 2018-01-28 RX ADMIN — LEVETIRACETAM 500 MG: 500 TABLET ORAL at 10:04

## 2018-01-28 RX ADMIN — LEVOTHYROXINE SODIUM 25 MCG: 25 TABLET ORAL at 05:08

## 2018-01-28 RX ADMIN — LEVETIRACETAM 500 MG: 500 TABLET ORAL at 21:02

## 2018-01-28 NOTE — PLAN OF CARE
Discharge to home or other facility with appropriate resources Progressing      Absence or prevention of progression during hospitalization Progressing      Absence of fever/infection during neutropenic period Progressing      Patient/family/caregiver demonstrates understanding of disease process, treatment plan, medications, and discharge instructions Progressing      Nutrient/Hydration intake appropriate for improving, restoring or maintaining nutritional needs Progressing      Verbalizes/displays adequate comfort level or baseline comfort level Progressing      Patient will remain free of falls Progressing      Skin integrity is maintained or improved Progressing      Maintain or return to baseline ADL function Progressing      Maintain or return mobility status to optimal level Progressing      Remains free of harm/injury (restraint for non violent/non self-detsructive behavior) Progressing      Returns to optimal restraint-free functioning Progressing

## 2018-01-28 NOTE — PROGRESS NOTES
Progress Note - David Alicia 1945, 67 y o  female MRN: [id number]    [facility] Encounter: [id number]    Primary Care Provider: Lena Craig MD   Date and time admitted to hospital: 1/23/2018  4:26 PM    Urinary tract infection   Assessment & Plan    Day 5/5 macrobid course  - on void trial        Ambulatory dysfunction   Assessment & Plan    PT/OT evaluation        Delirium   Assessment & Plan    - frequent reorientation  - adjust sleep-wake cycle  - seroquel hs        * Subdural hematoma (HCC)   Assessment & Plan    S/p left craniotomy and drainage of subdural 1/24  - drains removed  - remains intermittently confused  - GCS 14 this AM  - continue keppra                  Subjective/Objective   Chief Complaint: None    Subjective: Intermittently confused overnight, trying to get out of bed  Otherwise no acute events  Tolerating diet  Objective:     Meds/Allergies   Prescriptions Prior to Admission   Medication Sig Dispense Refill Last Dose    acetaminophen (TYLENOL) 325 mg tablet Take 2 tablets every 6 hours as needed for pain 30 tablet 0     bisacodyl (DULCOLAX) 10 mg suppository Insert 1 suppository into the rectum daily as needed for constipation 12 suppository 0     cholecalciferol 1000 units tablet Take 1 tablet by mouth daily 30 tablet 0     docusate sodium (COLACE) 100 mg capsule Take 1 capsule by mouth 2 (two) times a day 10 capsule 0     levETIRAcetam (KEPPRA) 500 mg tablet Take 1 tablet by mouth every 12 (twelve) hours for 8 doses 8 tablet 0     levothyroxine 25 mcg tablet Take 1 tablet by mouth daily in the early morning 30 tablet 0     nicotine (NICODERM CQ) 14 mg/24hr TD 24 hr patch Place 1 patch on the skin daily 28 patch 0        Vitals: Blood pressure (!) 89/54, pulse 75, temperature 98 5 °F (36 9 °C), temperature source Oral, resp  rate 18, height 5' 7" (1 702 m), weight 59 2 kg (130 lb 8 2 oz), SpO2 98 %, not currently breastfeeding  Body mass index is 20 44 kg/m²  SpO2 Device: O2 Device: None (Room air)    ABG: No results found for: PHART, AXT1WID, PO2ART, WTU3FGK, G2NGIJMJ, BEART, SOURCE      Intake/Output Summary (Last 24 hours) at 01/28/18 08  Last data filed at 01/28/18 0536   Gross per 24 hour   Intake              290 ml   Output                0 ml   Net              290 ml       Invasive Devices     Peripheral Intravenous Line            Peripheral IV 01/25/18 Right Forearm 2 days                Nutrition/GI Proph/Bowel Reg: Dysphagia 2    Physical Exam:   GENERAL APPEARANCE: No acute distress, resting comfortably, arousable  HEENT: left craniotomy site c/d/i  CV: regular rate  LUNGS: no respiratory distress  ABD: soft NT ND, posey in place  EXT: moving all 4 extremities spontaneously, atraumatic  NEURO: non-focal  SKIN: intact    Lab Results: Results: I have personally reviewed pertinent reports  Imaging/EKG Studies: Results: I have personally reviewed pertinent reports      Other Studies: n/a  VTE Prophylaxis: Held

## 2018-01-28 NOTE — OCCUPATIONAL THERAPY NOTE
Occupational Therapy Treatment Note:       01/28/18 1118   Restrictions/Precautions   Other Precautions Fall Risk;Restraints  (WAIST)   Pain Assessment   Pain Assessment No/denies pain   Pain Score No Pain   Pain Location Head   ADL   Grooming Assistance 4  Minimal Assistance   Grooming Deficit Verbal cueing;Setup;Supervision/safety; Increased time to complete;Brushing hair;Teeth care   Grooming Comments PT WAS ABLE TO [address] AND DRY FACE, APPLY POWDER, COMB HAIR AND BRUSH TEETH WITH MINIMAL ASSISTANCE WHILE SEATED OOB IN CHAIR  PT REQUIRED ASST FOR SPILLAGE DURING ORAL CARE  AND ASST TO COMB TANGLED HAIR AND AVOID INCISION  UB Bathing Assistance 4  Minimal Assistance   LB Bathing Assistance 3  Moderate Assistance   LB Bathing Comments ASST FOR FEET AND THOROUGHNESS OF BUTTOCKS   UB Dressing Assistance 3  Moderate Assistance   LB Dressing Assistance 3  Moderate Assistance   LB Dressing Comments SEATED IN CHAIR PT WAS ABLE TO ROBERT UNDERWEAR OVER FEET WITH SBA, DOFF SOCKS WITH SBA ROBERT SOCKS WITH MOD ASST AND MANAGE UNDERWEAR OVER HIPS IN STANCE WITH MOD ASST  PT REQUIRES INCREASED TIME AND VC'S FOR SAFETY     Toileting Assistance  3  Moderate Assistance  (MOD ASST CLOTHING MANAGEMENT)   Functional Standing Tolerance   Time 2 MIN DURING AM CARE FAIR MINUS BALANCE C RW   Bed Mobility   Supine to Sit 4  Minimal assistance   Additional items Assist x 1  (BED FLAT WITHOUT RAILS)   Sit to Supine 4  Minimal assistance   Additional items Assist x 1   Transfers   Sit to Stand 3  Moderate assistance   Additional items Assist x 1   Stand to Sit 3  Moderate assistance   Additional items Assist x 1   Stand pivot 3  Moderate assistance   Additional items Assist x 1  (RW, ASST TO STEER AND MANAGE RW)   Functional Mobility   Functional Mobility 3  Moderate assistance   Additional Comments ASSISTANCE X 1   Additional items Rolling walker  (POOR ABILITY TO STEER OR MANAGE RW)   Coordination   Gross Motor PT WITH SPILLAGE NOTED WHEN HOLDING CUP WITH MOUTHWASH SECONDARY TO B UE COORDINATION DIFFICULTY / Nelida Gaines  Fine Motor PT WAS ABLE TO OPEN SMALL GROOMING BOTTLES, APPLY TOOTHPASTE ETC WITH INCREASED TIME USING B HANDS    Cognition   Overall Cognitive Status Impaired   Arousal/Participation Cooperative; Alert   Attention Attends with cues to redirect   Orientation Level Disoriented to time   Memory Decreased recall of precautions;Decreased recall of recent events;Decreased short term memory   Following Commands Follows one step commands with increased time or repetition   Comments PT WAS ABLE TO FOLLOW ROUTINE TASKS NEEDING MIN ASST FOR THOROUGHNESS AND FOR SPILLAGE OF MOUTHWASH, POWDER ETC   PT WAS ABLE TO INITIATE AND TERMINATE TASKS WITHOUT DIFFICULTY   Activity Tolerance   Activity Tolerance Patient tolerated treatment well   Assessment   Assessment PT PARTICIPATED IN AM OT SESSION AND WAS SEEN FOCUSING ON AM CARE WHILE SEATED OOB IN BEDSIDE CHAIR  PT REQUIRES ASST TO STEER AND MANAGE RW AS WELL AS MOD ASST TO MAINTAIN BALANCE  PT WITH POOR SAFETY / CARRYOVER OF HAND PLACEMENT  PT WITH POOR RECALL OF ORIENTATION TO TIME S/P INSTRUCTION  PT REQURIES ASST X 1 FOR SPT AND FOR OOB ADLS  PT WAS WEARING WAIST RESTRAINT WHICH WAS REAPPLIED PER NSG REQUEST  PT WAS COOPERATIVE AND TALKATIVE  NEEDING MINIMAL REDIRECTION TO TASK  PT REQURIED ASST FOR THOROUGHNESS BUT WAS ABLE TO INITAITE AND TERMIANTE TASK APPROPRAITELY  PT REMAINS AT INCREASED  RISK FOR FALLS, HAS  FAIR MINUS BALANCE AND FAIR ACTIVITY TOLERENCE  Plan   Treatment Interventions ADL retraining; Endurance training;Cognitive reorientation;UE strengthening/ROM; Functional transfer training;Patient/family training;Equipment evaluation/education; Compensatory technique education; Fine motor coordination activities; Activityengagement   Goal Expiration Date 02/09/18   OT Frequency 3-5x/wk   Recommendation   OT Discharge Recommendation Short Term Rehab   Barthel Index   Feeding 5   Bathing 0 Grooming Score 0   Dressing Score 0   Bladder Score 0   Bowels Score 5   Toilet Use Score 5   Transfers (Bed/Chair) Score 5   Mobility (Level Surface) Score 0   Stairs Score 0   Barthel Index Score 20   Modified [place] Scale   Modified [place] Scale 4   April A REBECCA Merritt

## 2018-01-28 NOTE — ASSESSMENT & PLAN NOTE
S/p left craniotomy and drainage of subdural 1/24  - drains removed  - remains intermittently confused  - GCS 14 this AM  - continue keppra

## 2018-01-28 NOTE — PROGRESS NOTES
Patient did not void through this shift  Patient bladder scanned for 576 ml  Trauma paged, no new orders; predicting patient will void on her own  Will continue to monitor

## 2018-01-29 ENCOUNTER — APPOINTMENT (INPATIENT)
Dept: RADIOLOGY | Facility: HOSPITAL | Age: 73
DRG: 026 | End: 2018-01-29
Payer: COMMERCIAL

## 2018-01-29 ENCOUNTER — APPOINTMENT (INPATIENT)
Dept: NEUROLOGY | Facility: AMBULATORY SURGERY CENTER | Age: 73
DRG: 026 | End: 2018-01-29
Payer: COMMERCIAL

## 2018-01-29 LAB
ALBUMIN SERPL BCP-MCNC: 2.8 G/DL (ref 3.5–5)
ALP SERPL-CCNC: 79 U/L (ref 46–116)
ALT SERPL W P-5'-P-CCNC: 16 U/L (ref 12–78)
AST SERPL W P-5'-P-CCNC: 16 U/L (ref 5–45)
BILIRUB DIRECT SERPL-MCNC: 0.1 MG/DL (ref 0–0.2)
BILIRUB SERPL-MCNC: 0.31 MG/DL (ref 0.2–1)
FOLATE SERPL-MCNC: 11.7 NG/ML (ref 3.1–17.5)
PROT SERPL-MCNC: 5.8 G/DL (ref 6.4–8.2)
TSH SERPL DL<=0.05 MIU/L-ACNC: 0.46 UIU/ML (ref 0.36–3.74)

## 2018-01-29 PROCEDURE — 99222 1ST HOSP IP/OBS MODERATE 55: CPT | Performed by: NURSE PRACTITIONER

## 2018-01-29 PROCEDURE — 95951 HB EEG MONITORING/VIDEORECORD: CPT

## 2018-01-29 PROCEDURE — 99232 SBSQ HOSP IP/OBS MODERATE 35: CPT | Performed by: SURGERY

## 2018-01-29 PROCEDURE — 95951 PR EEG MONITORING/VIDEORECORD: CPT | Performed by: PSYCHIATRY & NEUROLOGY

## 2018-01-29 PROCEDURE — 99232 SBSQ HOSP IP/OBS MODERATE 35: CPT | Performed by: EMERGENCY MEDICINE

## 2018-01-29 PROCEDURE — 70450 CT HEAD/BRAIN W/O DYE: CPT

## 2018-01-29 PROCEDURE — 97116 GAIT TRAINING THERAPY: CPT

## 2018-01-29 PROCEDURE — 97535 SELF CARE MNGMENT TRAINING: CPT

## 2018-01-29 PROCEDURE — 99222 1ST HOSP IP/OBS MODERATE 55: CPT | Performed by: PHYSICIAN ASSISTANT

## 2018-01-29 RX ORDER — OLANZAPINE 5 MG/1
5 TABLET ORAL
Status: DISCONTINUED | OUTPATIENT
Start: 2018-01-29 | End: 2018-01-29

## 2018-01-29 RX ADMIN — LEVOTHYROXINE SODIUM 25 MCG: 25 TABLET ORAL at 08:15

## 2018-01-29 RX ADMIN — OLANZAPINE 5 MG: 5 TABLET, FILM COATED ORAL at 01:09

## 2018-01-29 RX ADMIN — LEVETIRACETAM 500 MG: 500 TABLET ORAL at 20:32

## 2018-01-29 RX ADMIN — DOCUSATE SODIUM 100 MG: 100 CAPSULE, LIQUID FILLED ORAL at 20:32

## 2018-01-29 RX ADMIN — QUETIAPINE FUMARATE 50 MG: 25 TABLET ORAL at 21:26

## 2018-01-29 RX ADMIN — HALOPERIDOL LACTATE 2.5 MG: 5 INJECTION, SOLUTION INTRAMUSCULAR at 21:23

## 2018-01-29 RX ADMIN — LEVETIRACETAM 500 MG: 500 TABLET ORAL at 08:15

## 2018-01-29 RX ADMIN — HALOPERIDOL LACTATE 2.5 MG: 5 INJECTION, SOLUTION INTRAMUSCULAR at 02:29

## 2018-01-29 RX ADMIN — DOCUSATE SODIUM 100 MG: 100 CAPSULE, LIQUID FILLED ORAL at 08:15

## 2018-01-29 NOTE — OCCUPATIONAL THERAPY NOTE
Occupational Therapy Treatment Note:       01/29/18 1055   Restrictions/Precautions   Other Precautions Cognitive; Chair Alarm; Fall Risk   Pain Assessment   Pain Assessment No/denies pain   Pain Score No Pain   ADL   Grooming Assistance 2  Maximal Assistance   Grooming Comments max asst to comb hair and place in high ponytail   LB Dressing Assistance 5  Supervision/Setup   LB Dressing Comments seated with increaed time pt was able to pedro b socks  Functional Standing Tolerance   Time poor plus balance   Bed Mobility   Rolling R 5  Supervision   Additional items Assist x 1   Rolling L 5  Supervision   Additional items Assist x 1   Transfers   Sit to Stand 4  Minimal assistance   Stand to Sit 3  Moderate assistance  (pt isn oted to sit prematurely on armrest  if not assisted)   Stand pivot 3  Moderate assistance   Functional Mobility   Functional Mobility 3  Moderate assistance   Additional Comments ax1   Additional items Rolling walker   Coordination   Fine Motor fine and gross coordination deficits noetd during hair care  Cognition   Overall Cognitive Status Impaired   Arousal/Participation Cooperative   Activity Tolerance   Activity Tolerance Patient tolerated treatment well   Assessment   Assessment pt participated in am ot session and was seen focusing on groomnig of hair, bedm obility, functional moblty and trasnfers, lb drssing seated eob, orinetatino, cogntiive tasks  waist restraints and wrist restraints were removed and pt was left oob in bedside chair on chair alarm with call bell with nsg approval  pt requires mod asst x 1 to walk moderate distances with rw  pt with lue and  le weakness which limits independence  pt with poor awareness of same  pt requried max ast to place [facility] to decreased coordination l ue  pt was plesant and cooperative  pt is grossly orieted to month and fact that she is in [facility], spring for season, 2001 for year   poor recall of info 5 min later  pt was able to sequence playing cards in number order with min vc's  pt was cooperative and talaktive thsi session  difficlt to understand at times  Plan   Treatment Interventions ADL retraining;Functional transfer training;Visual perceptual retraining;UE strengthening/ROM; Endurance training;Cognitive reorientation;Patient/family training;Equipment evaluation/education; Activityengagement   Goal Expiration Date 02/09/18   Treatment Day 2   OT Frequency 3-5x/wk   Recommendation   OT Discharge Recommendation Short Term Rehab   Barthel Index   Feeding 5   Bathing 0   Grooming Score 0   Dressing Score 5   Bladder Score 5   Bowels Score 5   Toilet Use Score 5   Transfers (Bed/Chair) Score 5   Mobility (Level Surface) Score 0   Stairs Score 0   Barthel Index Score 30   Modified [place] Scale   Modified [place] Scale 4   April A REBECCA Merritt
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
## 2018-01-29 NOTE — PROGRESS NOTES
Accept Note - Critical Care   Alla Barger 67 y o  female MRN: [id number]  Unit/Bed#: [facility] 903-01 Encounter: [id number]    Assessment:   Principal Problem:    Subdural hematoma  Active Problems:    Delirium    Cognitive impairment    Ambulatory dysfunction    Fall    Physical deconditioning    Visual impairment    Urinary tract infection    Plan:   Neuro:   · cEEG x24 hours to evaluate for subclinical seizures  · Continue Keppra 500mg q12, Depakote load/dosing per neurology  · Neurology and neurosurgery following, appreciate recommendations  · Continue Seroquel 50mg qhs  · Q1 hour neuro checks  · Daily CAM-ICU, delirium precautions  · CT head improved    CV:   · Hemodynamic monitoring  · MAP goal >65  · Continue to hold home aspirin in setting of SDH    Lung:   · Encourage incentive spirometry, pulmonary toilet    GI:   · No indication for GI ppx  · Bowel regimen with colace    FEN:   · No maintenance fluids  · Replete electrolytes as needed, AM labs  · Dysphagia diet: mechanical soft with thin liquid    :   · Prior UTI, completed course of Ancef  · No alexis at this time  · Monitor I/O's    ID:   · No active issues  · Monitor fever curve/white count    Heme:   · SCDs only for ppx, holding all chemoprophylaxis in setting of SDH    Endo:   · No active issues    Msk/Skin:   · Frequent turns and repositioning  · PT/OT  · Placement upon discharge    Disposition:   · SD1    ______________________________________________________________________  Chief Complaint: "Why am I here"    HPI/24hr events: Concern for change in mental status on the floor, neurology consulted and patient for STAT head CT that was negative   Patient transferred to the ICU as SD1  ______________________________________________________________________  Temperature:   Temp (24hrs), Av 9 °F (36 6 °C), Min:97 7 °F (36 5 °C), Max:98 4 °F (36 9 °C)    Current Temperature: 97 7 °F (36 5 °C)    Vitals:    18 2305 18 0300 18 0787 01/29/18 1043   BP: 108/58 135/61 132/63 131/56   BP Location: Left arm Left arm Left arm Left arm   Pulse: 80 80 80 88   Resp: 19 18 18 18   Temp: 98 4 °F (36 9 °C) 97 7 °F (36 5 °C) 97 8 °F (36 6 °C) 97 7 °F (36 5 °C)   TempSrc: Oral Oral Oral Oral   SpO2: 96% 98% 97% 99%   Weight:       Height:         Arterial Line BP: 108/54  Arterial Line MAP (mmHg): 78 mmHg     Weights:   IBW: 61 6 kg    Body mass index is 20 44 kg/m²  Weight (last 2 days)     Date/Time   Weight    01/28/18 0535  59 2 (130 51)            Height: 5' 7" (170 2 cm) (per allscripts 8/4/17)    No results found for: PHART, RLP1OMK, PO2ART, BYC5PFJ, Z5NNPNUH, BEART, SOURCE  SpO2: SpO2: 99 %    ______________________________________________________________________  Physical Exam:  Jauregui Agitation Sedation Scale (RASS): Agitated  Physical Exam   Constitutional: Vital signs are normal  No distress  Stearns in place   HENT:   Head: Normocephalic  L craniotomy site C/D/I w/ staples intact   Eyes: Pupils are equal, round, and reactive to light  R eye w/ lateral gaze, chronic   Cardiovascular: Normal rate and regular rhythm  Pulses:       Radial pulses are 2+ on the right side, and 2+ on the left side  Dorsalis pedis pulses are 2+ on the right side, and 2+ on the left side  Pulmonary/Chest: No accessory muscle usage  No respiratory distress  She has no decreased breath sounds  She has no wheezes  She has no rhonchi  She has no rales  Abdominal: Soft  Normal appearance  She exhibits no distension  There is no tenderness  Musculoskeletal:   Moving all extremities x4, strength 5/5 throughout   Neurological: She is alert  GCS eye subscore is 4  GCS verbal subscore is 4  GCS motor subscore is 6  Patient non-participative in neuro exam secondary to agitation  Oriented to self only  States she is at [facility] and the year is 26  Skin: Skin is warm, dry and intact  She is not diaphoretic  ______________________________________________________________________  Intake and Outputs:  I/O       01/27 0701 - 01/28 0700 01/28 0701 - 01/29 0700 01/29 0701 - 01/30 0700    P  O  290 820 562    I V  (mL/kg)       IV Piggyback       Total Intake(mL/kg) 290 (4 9) 820 (13 9) 562 (9 5)    Urine (mL/kg/hr)  816 (0 6)     Drains       Stool       Total Output   816      Net +290 +4 +562           Unmeasured Urine Occurrence 3 x 2 x     Unmeasured Stool Occurrence 2 x          Nutrition:        Diet Orders            Start     Ordered    01/25/18 1317  Diet Dysphagia/Modified Consistency; Dysphagia 2-Mechanical Soft; Dysphagia 2-Mechanical Soft; Thin Liquid  Diet effective now     Question Answer Comment   Diet Type Dysphagia/Modified Consistency    Dysphagia/Modified Consistency Dysphagia 2-Mechanical Soft    Other Restriction(s): Dysphagia 2-Mechanical Soft    Liquid Modifier Thin Liquid    RD to adjust diet per protocol?  No        01/25/18 1316          Labs:     Results from last 7 days  Lab Units 01/28/18 0438 01/27/18 0630 01/26/18  0525 01/25/18  0427   WBC Thousand/uL 6 88 9 90 9 58 9 36   HEMOGLOBIN g/dL 11 0* 12 1 11 5 11 4*   HEMATOCRIT % 32 9* 35 6 34 4* 33 7*   PLATELETS Thousands/uL 410* 429* 392* 378   NEUTROS PCT %  --  77* 69 89*   MONOS PCT %  --  7 9 2*      Results from last 7 days  Lab Units 01/28/18 0438 01/27/18  0630 01/26/18  0525   SODIUM mmol/L 140 136 137   POTASSIUM mmol/L 4 2 3 4* 3 4*   CHLORIDE mmol/L 108 102 105   CO2 mmol/L 26 28 26   BUN mg/dL 13 10 6   CREATININE mg/dL 0 51* 0 53* 0 47*   CALCIUM mg/dL 8 8 8 9 8 5   GLUCOSE RANDOM mg/dL 85 90 84       Results from last 7 days  Lab Units 01/28/18 0438 01/26/18  0525   MAGNESIUM mg/dL 2 2 2 1       Results from last 7 days  Lab Units 01/28/18  0438 01/27/18  0525 01/26/18  0525   PHOSPHORUS mg/dL 4 1 1 6* 2 2*        Results from last 7 days  Lab Units 01/26/18  0525 01/25/18  0427 01/23/18  1653   INR   --  1 09 1 03   PTT seconds 27 28 24           0  Lab Value Date/Time   TROPONINI <0 02 12/14/2017 1954   TROPONINI 0 02 08/04/2017 1225   TROPONINI 0 16 (H) 08/02/2017 1456     Imaging: Status post subdural drain removal   Next density fluid and air collection identified left hemisphere compatible with residual subdural collection with 13 mm maximum transverse dimension which is similar to prior however, no midline shift identified  Local mass effect noted  I have personally reviewed pertinent reports  Allergies: Allergies   Allergen Reactions    Acetazolamide      Other reaction(s): Unknown Allergic Reaction    Azithromycin      Other reaction(s): GI Upset    Barium Sulfate      Other reaction(s): GI Upset    Cefazolin     Loperamide      Other reaction(s): Unknown Allergic Reaction    Penicillins      Other reaction(s): Fever    Procaine Other (See Comments)     fever    Sulfa Antibiotics      fever     Medications:   Scheduled Meds:  docusate sodium 100 mg Oral BID   levETIRAcetam 500 mg Oral Q12H [address]   levothyroxine 25 mcg Oral Early Morning   OLANZapine 5 mg Oral HS   QUEtiapine 50 mg Oral HS     Continuous Infusions:   PRN Meds:    acetaminophen 650 mg Q6H PRN   bisacodyl 10 mg Daily PRN   haloperidol lactate 2 5 mg Q6H PRN   ondansetron 4 mg Q6H PRN     VTE Pharmacologic Prophylaxis:   Pharmacologic: None in setting of SDH  Mechanical VTE Prophylaxis in Place: Yes    Invasive lines and devices: Invasive Devices     Peripheral Intravenous Line            Peripheral IV 01/29/18 Right;Upper Arm less than 1 day                   Counseling / Coordination of Care  Total Critical Care time spent 24 minutes excluding procedures, teaching and family updates        Code Status: Level 1 - Full Code      Mitul Barillas PA-C

## 2018-01-29 NOTE — DISCHARGE INSTRUCTIONS
 Monitor incision daily  Keep incision clean and dry   May shower and wash hair 72 hours after surgery   Avoid rubbing the incision, but gently massage hair   Do not use a hair dryer, and avoid hair products such as mousse, oils, and gels  Do not brush your hair away from the incision since this will put strain on the suture line   Do no dye or perm hair until cleared by MD Albert Arellano Sutures/Donna will be removed 10 days after surgery   Please remove dressing within 1-2 days after surgery   May walk as tolerated- a few short walks daily   Avoid heavy lifting, pushing or pulling over 10lbs for several weeks   No bending  No running  No athletic activities during this period   Do not drive until cleared by MD/PA   Coumadin, ASA, Plavix may be restarted once cleared by MD Albert Arellano Recovery takes time  Please allow yourself time to heal    Follow-up for 2 week incision check/suture removal with a repeat CT head without contrast prior to visit  Follow-up for a 6 week post-operative visit  Please complete a repeat CT head without contrast to be completed prior to visit   CT scans must be scheduled, call [phone]    PLEASE ARRIVE 15 MINUTES PRIOR TO APPOINTMENT  CONTACT OFFICE TO ASSIST IN SETTING UP CT HEAD  THIS MAY REQUIRE PRE-AUTHORIZATION WHICH MAY TAKE UP TO 5 BUSINESS DAYS  **Please notify the office if incision becomes red, swollen, tender, or has increased drainage, and temp>101    Return to the ER if you experience increased headache, drowsiness, weakness, nausea/vomiting, or seizures**

## 2018-01-29 NOTE — CASE MANAGEMENT
Continued Stay Review    Date: 01/28/18    Vital Signs: /56 (BP Location: Left arm)   Pulse 88   Temp 97 7 °F (36 5 °C) (Oral)   Resp 18   Ht 5' 7" (1 702 m) Comment: per allscripts 8/4/17  Wt 59 2 kg (130 lb 8 2 oz)   LMP  (LMP Unknown)   SpO2 99%   BMI 20 44 kg/m²     Medications:   Scheduled Meds:   docusate sodium 100 mg Oral BID   levETIRAcetam 500 mg Oral Q12H DEVONTE   levothyroxine 25 mcg Oral Early Morning   OLANZapine 5 mg Oral HS   QUEtiapine 50 mg Oral HS     Continuous Infusions:    PRN Meds:   acetaminophen    bisacodyl    haloperidol lactate    ondansetron    Abnormal Labs/Diagnostic Results:   Creatinine 0 51       RBC 3 66     Hemoglobin 11 0     Hematocrit 32 9     Platelets 101       Age/Sex: 67 y o  female     Assessment/Plan:   Urinary tract infection   Assessment & Plan     Day 5/5 macrobid course  - on void trial          Ambulatory dysfunction   Assessment & Plan     PT/OT evaluation          Delirium   Assessment & Plan     - frequent reorientation  - adjust sleep-wake cycle  - seroquel hs          * Subdural hematoma (HCC)   Assessment & Plan     S/p left craniotomy and drainage of subdural 1/24  - drains removed  - remains intermittently confused  - GCS 14 this AM  - continue kera     Discharge Plan: rehab      Thank you,  [address] [facility] in the [facility] by Rubén Teran for 2017  Network Utilization Review Department  Phone: [phone]; Fax [phone]  ATTENTION: The Network Utilization Review Department is now centralized for our 7 Facilities  Make a note that we have a new phone and fax numbers for our Department  Please call with any questions or concerns to [phone] and carefully follow the prompts so that you are directed to the right person  All voicemails are confidential  Fax any determinations, approvals, denials, and requests for initial or continue stay review clinical to [phone]   Due to HIGH CALL volume, it would be easier if you could please send faxed requests to expedite your requests and in part, help us provide discharge notifications faster

## 2018-01-29 NOTE — PROGRESS NOTES
Patient at this time we transfer to the unit for continuous monitoring with EEG  This was discussed with my attending as well as the Neurology team   Neurosurgery reviewed the examination with their attending and evaluated the CT scan which showed no new reaccumulation of brain bleed  The patient was increased to step-down level 1 for EEG monitoring  The patient has been already kept on Keppra  The change in exam was noted to be slurred speech as well as decrease in 2 point discrimination inability to recollect objects as well as not repeat sentences back  Will continue to monitor and till patient is successfully transferred to the unit  Discussed with ICU attending  Notified the ICU team   Nursing aware
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
## 2018-01-29 NOTE — PROGRESS NOTES
Progress Note - Nimo Coughlin 1945, 67 y o  female MRN: [id number]    [facility] Encounter: [id number]    Primary Care Provider: Thomas Stevens MD   Date and time admitted to hospital: 1/23/2018  4:26 PM        Urinary tract infection   Assessment & Plan    - Completed course of Abx  - on void trial  - monitor urinary output        Ambulatory dysfunction   Assessment & Plan    PT/OT evaluation  Likely rehab at [facility] today         Delirium   Assessment & Plan    - frequent reorientation  - adjust sleep-wake cycle  - seroquel hs        * Subdural hematoma ([address] )   Assessment & Plan    S/p left craniotomy and drainage of subdural 1/24  - drains removed  - remains intermittently confused  - GCS 14 this AM  - continue keppra  - Chronic right lateral gaze, unable to cross midline          DVT prophylaxis:  Continue to hold chemical prophylaxis, continue SCDs  PT and OT:  Eval and treat    Disposition:  Continue work with case management for placement  Subjective/Objective   Chief Complaint: "Finally got to eat "     Subjective:  Patient offers no new complaints today on presentation  Patient reports that she is excited to eat  Patient denies any new headaches  Patient reports no new pain  Patient denies any chest pain or shortness of breath  Patient denies any new visual changes or auditory changes      Objective:     Meds/Allergies   Prescriptions Prior to Admission   Medication Sig Dispense Refill Last Dose    acetaminophen (TYLENOL) 325 mg tablet Take 2 tablets every 6 hours as needed for pain 30 tablet 0     bisacodyl (DULCOLAX) 10 mg suppository Insert 1 suppository into the rectum daily as needed for constipation 12 suppository 0     cholecalciferol 1000 units tablet Take 1 tablet by mouth daily 30 tablet 0     docusate sodium (COLACE) 100 mg capsule Take 1 capsule by mouth 2 (two) times a day 10 capsule 0     levETIRAcetam (KEPPRA) 500 mg tablet Take 1 tablet by mouth every 12 (twelve) hours for 8 doses 8 tablet 0     levothyroxine 25 mcg tablet Take 1 tablet by mouth daily in the early morning 30 tablet 0     nicotine (NICODERM CQ) 14 mg/24hr TD 24 hr patch Place 1 patch on the skin daily 28 patch 0        Vitals: Blood pressure 132/63, pulse 80, temperature 97 8 °F (36 6 °C), temperature source Oral, resp  rate 18, height 5' 7" (1 702 m), weight 59 2 kg (130 lb 8 2 oz), SpO2 97 %, not currently breastfeeding  Body mass index is 20 44 kg/m²  SpO2: SpO2: 97 %, SpO2 Device: O2 Device: None (Room air)    ABG: No results found for: PHART, IHF5RGO, PO2ART, NKI6MXU, J2HUTEIL, BEART, SOURCE      Intake/Output Summary (Last 24 hours) at 01/29/18 7898  Last data filed at 01/29/18 0522   Gross per 24 hour   Intake              820 ml   Output              816 ml   Net                4 ml       Invasive Devices     Peripheral Intravenous Line            Peripheral IV 01/29/18 Right;Upper Arm less than 1 day                Nutrition/GI Proph/Bowel Reg: continue current diet    Physical Exam:   GENERAL APPEARANCE: NAD, AOx2 (person and place)  HEENT: Incision remains C/D/I, no evidence of infection, CN 3 palsy   CV: RRR, no split S1/2, no presence of S3/4  LUNGS: CTA b/l, no rales or rhonchi  ABD: bowel sounds x4, non-tender, no rebound or guarding  EXT: +2 pulses on extremities, no edema noted  NEURO: CN 2-12 grossly intact, no focal deficits, cranial nerve 3 palsy  SKIN: warm, dry, intact    Lab Results: Results: I have personally reviewed pertinent reports   , BMP/CMP: No results found for: NA, K, CL, CO2, ANIONGAP, BUN, CREATININE, GLUCOSE, CALCIUM, AST, ALT, ALKPHOS, PROT, ALBUMIN, BILITOT, EGFR and CBC: No results found for: WBC, HGB, HCT, MCV, PLT, ADJUSTEDWBC, MCH, MCHC, RDW, MPV, NRBC  Imaging/EKG Studies: Results: I have personally reviewed pertinent reports      Other Studies: no other studies  VTE Prophylaxis: SCDs and no chemical prophylaxis per Neurosurgery    Patient care rounds were completed with the patient's nurse today, Malorie Montes De Oca  We discussed the plan is to continue management and look for discharge  We reviewed all of the invasive devices/lines/telemetry orders  Pain Assessment / Plan:  Continue current management  Mobility Assessment / Plan:  Continue current management  Goals / Barriers for discharge:  Case management finding placement  Case management following  All questions and concerns were addressed  I spent greater than 29 minutes reviewing the plan with the patient and the nurse, and coordinating care for the day      Munira Stephens PA-C  1/29/2018 9:26 AM

## 2018-01-29 NOTE — SOCIAL WORK
CM spoke to [facility] who explain that pt is being recommended for TBI rehab   CM left a voicemail for pt's  to discuss potential options

## 2018-01-29 NOTE — POST OP PROGRESS NOTES
Progress Note - Neurosurgery   Jeanmarie Higginbotham 67 y o  female MRN: [id number]  Unit/Bed#: [facility] 903-01 Encounter: [id number]      Assessment:  1  POD#5 left craniotomy for evacuation of subdural hematoma  2  Mixed left convexity subdural hematoma with midline shift and effacement  3  Gait dysfunction   4  Seizure disorder  5  History of left frontotemporal craniotomy for subdural hematoma evacuation 12/2017  6  History of lacunar infarct     Plan:  · Exam: GCS 14-15 (-1 confusion)  AAO x self, disoriented to location and time  FREEMAN wo focal weakness  LT intact  Follows one step commands, needs multiple repetition  Speech is dysarthric and slow, unable to complete a sentence  · Subdural drain removed without complications  · Imaging: personally reviewed and reviewed by attending:  · 1/25 - CT head wo: postoperative changes with left sided craniotomy, subdural hematoma evacuation and subdural drain catheters placement  No acute intracranial abnormality  Cerebral atrophy  · 1/29 - CT head wo:  Mixed density fluid and air collection in left hemisphere with residual subdural  Improve compared to prior scan on 1/25  · STAT CT head if decline in GCS >2 pts/1 hr  · Discussed change in examination with Trauma and Neurology who recommend spot EEG to rule out seizure  · SBP goal <160 - patient has history of hypotension   · Patient hypotensive overnight 80's/50's  · PT/OT: from nsx standpoint may mobilize  · DVT ppx: SCDs  · Na 140  · K+ 4 2  · WBC 6   · Pain control: Deferred to primary team  · Plan for patient to transfer to ICU for EEG  Medical management by surgical critical care team while in the intensive care unit  · No additional neurosurgical intervention at this time  S/O  Follow up 2 weeks for incision check and repeat CT scan  Subjective/Objective   Chief Complaint: "Good"    Subjective: Patient states she's doing good, she denies headaches, dizziness, chest pain, sob, abdominal pain/N/V, weakness  When asked how patient thinks her speech is she states "not right", she is able to state she feels like her speech is slower  Objective: sitting up in chair, eating lunch  NAD    I/O       01/27 0701 - 01/28 0700 01/28 0701 - 01/29 0700 01/29 0701 - 01/30 0700    P  O  290 820 360    I V  (mL/kg)       IV Piggyback       Total Intake(mL/kg) 290 (4 9) 820 (13 9) 360 (6 1)    Urine (mL/kg/hr)  816 (0 6)     Drains       Stool       Total Output   816      Net +290 +4 +360           Unmeasured Urine Occurrence 3 x 2 x     Unmeasured Stool Occurrence 2 x            Invasive Devices     Peripheral Intravenous Line            Peripheral IV 01/29/18 Right;Upper Arm less than 1 day                Physical Exam:  Vitals: Blood pressure 131/56, pulse 88, temperature 97 7 °F (36 5 °C), temperature source Oral, resp  rate 18, height 5' 7" (1 702 m), weight 59 2 kg (130 lb 8 2 oz), SpO2 99 %, not currently breastfeeding  ,Body mass index is 20 44 kg/m²  General appearance: alert, appears stated age, cooperative and no distress  Head: Normocephalic, left parietal incision well approximated, nonerythematous, nonedematous  Eyes: baseline right CN3 palsy that cannot cross midline  Left EOMI, PERRL  Lungs: non labored breathing  Heart: regular heart rate  Neurologic:   Mental status: GCS 14 (-1 for confusion) Alert, oriented x2  Disoriented to time, Speech is garbled, hard to understand  Unable to fully complete sentence  3/3 immediate object recognition  Follows one-step commands with multiple repetition  Decreased attention  Cranial nerves: grossly intact (Cranial nerves II-XII) with exception to right CN3 palsy that is baseline  Facial symmetry with expression equal  Tongue is midline  Sensory: normal to LT in bilateral upper and lower extremities  Motor: moving all extremities without focal weakness   Strength in bilateral upper and lower extremities 5/5  Reflexes: 2+ and symmetric  negative hartmann, negative clonus  Coordination: right finger to nose normal; left finger to nose dysmetria, no drift bilaterally      Lab Results:    Results from last 7 days  Lab Units 01/28/18  0438 01/27/18  0630 01/26/18  0525 01/25/18  0427   WBC Thousand/uL 6 88 9 90 9 58 9 36   HEMOGLOBIN g/dL 11 0* 12 1 11 5 11 4*   HEMATOCRIT % 32 9* 35 6 34 4* 33 7*   PLATELETS Thousands/uL 410* 429* 392* 378   NEUTROS PCT %  --  77* 69 89*   MONOS PCT %  --  7 9 2*       Results from last 7 days  Lab Units 01/28/18  0438 01/27/18  0630 01/26/18  0525   SODIUM mmol/L 140 136 137   POTASSIUM mmol/L 4 2 3 4* 3 4*   CHLORIDE mmol/L 108 102 105   CO2 mmol/L 26 28 26   BUN mg/dL 13 10 6   CREATININE mg/dL 0 51* 0 53* 0 47*   CALCIUM mg/dL 8 8 8 9 8 5   GLUCOSE RANDOM mg/dL 85 90 84       Results from last 7 days  Lab Units 01/28/18  0438 01/26/18  0525   MAGNESIUM mg/dL 2 2 2 1       Results from last 7 days  Lab Units 01/28/18  0438 01/27/18  0525 01/26/18  0525   PHOSPHORUS mg/dL 4 1 1 6* 2 2*       Results from last 7 days  Lab Units 01/26/18  0525 01/25/18  0427 01/23/18  1653   INR   --  1 09 1 03   PTT seconds 27 28 24     No results found for: TROPONINT  ABG:No results found for: PHART, CQZ9DGO, PO2ART, BUE9RBS, F1SQXGIO, BEART, SOURCE    Imaging Studies: I have personally reviewed pertinent reports  and I have personally reviewed pertinent films in PACS  CT head wo contrast   Final Result   1  Postoperative changes with left-sided craniotomy, evacuation of subdural hemorrhage and subdural drainage catheter placement  No acute intracranial abnormality  2   Cerebral atrophy with chronic small vessel ischemic change  Findings are consistent with the preliminary report from [facility] which was provided shortly after completion of the exam                       Workstation performed: [id number]         CT head wo contrast    (Results Pending)       EKG, Pathology, and Other Studies: I have personally reviewed pertinent reports  VTE  Prophylaxis: Sequential compression device (Venodyne)

## 2018-01-29 NOTE — PROGRESS NOTES
Was called to bedside to evaluate patient for change in speech in 2 point discrimination  Discussed case with neurosurgery and we agreed that a stat head CT would be appropriate given patient's medical history  Discussed with Neurosurgery team as well as Neurology that a spot EEG would be appropriate at this time  We will wait results of test and then re-evaluate patient  Will increase neuro checks to Q 2 hours continue to monitor patient for worsening examination    Examine the patient at bedside with the Neurosurgery team

## 2018-01-29 NOTE — PLAN OF CARE
Problem: PHYSICAL THERAPY ADULT  Goal: Performs mobility at highest level of function for planned discharge setting  See evaluation for individualized goals  Treatment/Interventions: Functional transfer training, LE strengthening/ROM, Elevations, Therapeutic exercise, Endurance training, Cognitive reorientation, Patient/family training, Equipment eval/education, Bed mobility, Gait training, Spoke to nursing, Spoke to case management, OT          See flowsheet documentation for full assessment, interventions and recommendations  Prognosis: Fair  Problem List: Decreased strength, Decreased endurance, Impaired balance, Decreased mobility, Decreased coordination, Decreased cognition, Impaired judgement, Decreased safety awareness, Impaired hearing, Decreased skin integrity  Assessment: Pt seen for therapy session today  Pt upright in non-reclining chair upon PT arrival  Pt mod A to perform sit-stand transfer and min A to amb 40 ft using RW ; excessive VC , decreased foot clearance, improper weight shift, short stride, slow  Pt needs continuous VC throughout session in order to appropriately perform tasks  Upon returning to chair, pt impulsively attempted to sit in chair before she was in proper position prompting PT to catch pt and lower her into chair  Pt performed stand pivot transfers into recliner chair but again attempted to sit down in chair before she was in proper position which prompted therapist to assist  Pt left upright in recliner chair w/ legs elevated and call bell in reach upon PT departure  Skilled PT needed to increase mobility, strength, balance, coordination and maximize func independence  PT rec is IP rehab upon DC  Barriers to Discharge: Inaccessible home environment     Recommendation: Post acute IP rehab     PT - OK to Discharge: Yes    See flowsheet documentation for full assessment

## 2018-01-29 NOTE — ASSESSMENT & PLAN NOTE
S/p left craniotomy and drainage of subdural 1/24  - drains removed  - remains intermittently confused  - GCS 14 this AM  - continue keppra  - Chronic right lateral gaze, unable to cross midline

## 2018-01-29 NOTE — PHYSICAL THERAPY NOTE
PT PROGRESS NOTE       01/29/18 1110   Pain Assessment   Pain Assessment No/denies pain   Pain Score No Pain   Hospital Pain Intervention(s) Ambulation/increased activity;Repositioned   Response to Interventions unchanged   Restrictions/Precautions   Weight Bearing Precautions Per Order No   Other Precautions Impulsive;Cognitive; Chair Alarm; Fall Risk   General   Chart Reviewed Yes   Response to Previous Treatment Patient with no complaints from previous session  Family/Caregiver Present No   Cognition   Overall Cognitive Status Impaired   Arousal/Participation Cooperative   Attention Difficulty attending to directions   Orientation Level Oriented to person   Following Commands Follows one step commands with increased time or repetition   Subjective   Subjective pt states she is willing to get up and go for a walk   Bed Mobility   Additional Comments pt up OOB in chair upon PT arrival   Transfers   Sit to Stand 3  Moderate assistance   Additional items Assist x 1; Increased time required;Verbal cues   Stand to Sit 3  Moderate assistance   Additional items Assist x 1; Increased time required;Verbal cues   Stand pivot 3  Moderate assistance   Additional items Assist x 1; Increased time required;Verbal cues   Ambulation/Elevation   Gait pattern Narrow LUIS ANTONIO; Improper Weight shift;Decreased foot clearance;Decreased R stance; Short stride; Step to;Excessively slow   Gait Assistance 4  Minimal assist   Additional items Assist x 1;Verbal cues   Assistive Device Rolling walker   Distance 40 ft   Balance   Static Sitting Fair   Dynamic Sitting Poor +   Static Standing Poor +   Dynamic Standing Poor   Ambulatory Poor +   Endurance Deficit   Endurance Deficit Yes   Endurance Deficit Description fatigue, RLE weakness   Activity Tolerance   Activity Tolerance Patient limited by fatigue   Nurse [address], RN   Assessment   Prognosis Fair   Problem List Decreased strength;Decreased endurance; Impaired balance;Decreased mobility; Decreased coordination;Decreased cognition; Impaired judgement;Decreased safety awareness; Impaired hearing;Decreased skin integrity   Assessment Pt seen for therapy session today  Pt upright in non-reclining chair upon PT arrival  Pt mod A to perform sit-stand transfer and min A to amb 40 ft using RW ; excessive VC , decreased foot clearance, improper weight shift, short stride, slow  Pt needs continuous VC throughout session in order to appropriately perform tasks  Upon returning to chair, pt impulsively attempted to sit in chair before she was in proper position prompting PT to catch pt and lower her into chair  Pt performed stand pivot transfers into recliner chair but again attempted to sit down in chair before she was in proper position which prompted therapist to assist  Pt left upright in recliner chair w/ legs elevated, chair alarm activated, and call bell in reach upon PT departure; nsg made aware and will follow up  Skilled PT needed to increase mobility, strength, balance, coordination and maximize func independence for optimal safety mobility and caregiver ease w/ ADLs  PT rec is IP rehab upon DC   Barriers to Discharge Inaccessible home environment   Goals   Patient Goals none stated   STG Expiration Date 02/09/18   Plan   Treatment/Interventions Functional transfer training;LE strengthening/ROM; Therapeutic exercise; Endurance training;Patient/family training;Equipment eval/education; Bed mobility;Gait training; Compensatory technique education;Continued evaluation;Spoke to nursing   Progress Progressing toward goals   PT Frequency (6x/wk)   Recommendation   Recommendation Post acute IP rehab   Equipment Recommended Walker   PT - OK to Discharge Yes

## 2018-01-29 NOTE — PLAN OF CARE
DISCHARGE PLANNING     Discharge to home or other facility with appropriate resources Adequate for Discharge        INFECTION - ADULT     Absence or prevention of progression during hospitalization Adequate for Discharge     Absence of fever/infection during neutropenic period Adequate for Discharge        Knowledge Deficit     Patient/family/caregiver demonstrates understanding of disease process, treatment plan, medications, and discharge instructions Adequate for Discharge        Nutrition/Hydration-ADULT     Nutrient/Hydration intake appropriate for improving, restoring or maintaining nutritional needs Adequate for Discharge        PAIN - ADULT     Verbalizes/displays adequate comfort level or baseline comfort level Adequate for Discharge        Potential for Falls     Patient will remain free of falls Adequate for Discharge        Prexisting or High Potential for Compromised Skin Integrity     Skin integrity is maintained or improved Adequate for Discharge        SAFETY ADULT     Maintain or return to baseline ADL function Adequate for Discharge     Maintain or return mobility status to optimal level Adequate for Discharge        SAFETY,RESTRAINT: NV/NON-SELF DESTRUCTIVE BEHAVIOR     Remains free of harm/injury (restraint for non violent/non self-detsructive behavior) Adequate for Discharge     Returns to optimal restraint-free functioning Adequate for Discharge

## 2018-01-30 ENCOUNTER — APPOINTMENT (INPATIENT)
Dept: NEUROLOGY | Facility: AMBULATORY SURGERY CENTER | Age: 73
DRG: 026 | End: 2018-01-30
Payer: COMMERCIAL

## 2018-01-30 LAB
ANION GAP SERPL CALCULATED.3IONS-SCNC: 6 MMOL/L (ref 4–13)
ATRIAL RATE: 72 BPM
BASOPHILS # BLD AUTO: 0.03 THOUSANDS/ΜL (ref 0–0.1)
BASOPHILS NFR BLD AUTO: 0 % (ref 0–1)
BUN SERPL-MCNC: 14 MG/DL (ref 5–25)
CALCIUM SERPL-MCNC: 9 MG/DL (ref 8.3–10.1)
CHLORIDE SERPL-SCNC: 106 MMOL/L (ref 100–108)
CO2 SERPL-SCNC: 27 MMOL/L (ref 21–32)
CREAT SERPL-MCNC: 0.48 MG/DL (ref 0.6–1.3)
EOSINOPHIL # BLD AUTO: 0.24 THOUSAND/ΜL (ref 0–0.61)
EOSINOPHIL NFR BLD AUTO: 3 % (ref 0–6)
ERYTHROCYTE [DISTWIDTH] IN BLOOD BY AUTOMATED COUNT: 13.7 % (ref 11.6–15.1)
GFR SERPL CREATININE-BSD FRML MDRD: 98 ML/MIN/1.73SQ M
GLUCOSE SERPL-MCNC: 92 MG/DL (ref 65–140)
HCT VFR BLD AUTO: 35.4 % (ref 34.8–46.1)
HGB BLD-MCNC: 12 G/DL (ref 11.5–15.4)
LYMPHOCYTES # BLD AUTO: 1.64 THOUSANDS/ΜL (ref 0.6–4.47)
LYMPHOCYTES NFR BLD AUTO: 17 % (ref 14–44)
MCH RBC QN AUTO: 30.4 PG (ref 26.8–34.3)
MCHC RBC AUTO-ENTMCNC: 33.9 G/DL (ref 31.4–37.4)
MCV RBC AUTO: 90 FL (ref 82–98)
MONOCYTES # BLD AUTO: 0.73 THOUSAND/ΜL (ref 0.17–1.22)
MONOCYTES NFR BLD AUTO: 8 % (ref 4–12)
NEUTROPHILS # BLD AUTO: 6.77 THOUSANDS/ΜL (ref 1.85–7.62)
NEUTS SEG NFR BLD AUTO: 72 % (ref 43–75)
NRBC BLD AUTO-RTO: 0 /100 WBCS
P AXIS: 79 DEGREES
PLATELET # BLD AUTO: 451 THOUSANDS/UL (ref 149–390)
PMV BLD AUTO: 9 FL (ref 8.9–12.7)
POTASSIUM SERPL-SCNC: 4 MMOL/L (ref 3.5–5.3)
PR INTERVAL: 158 MS
QRS AXIS: -30 DEGREES
QRSD INTERVAL: 63 MS
QT INTERVAL: 392 MS
QTC INTERVAL: 429 MS
RBC # BLD AUTO: 3.95 MILLION/UL (ref 3.81–5.12)
SODIUM SERPL-SCNC: 139 MMOL/L (ref 136–145)
T WAVE AXIS: 79 DEGREES
VALPROATE SERPL-MCNC: 4 UG/ML (ref 50–100)
VALPROATE SERPL-MCNC: 46 UG/ML (ref 50–100)
VENTRICULAR RATE: 72 BPM
VIT B12 SERPL-MCNC: 1848 PG/ML (ref 100–900)
WBC # BLD AUTO: 9.44 THOUSAND/UL (ref 4.31–10.16)

## 2018-01-30 PROCEDURE — 99232 SBSQ HOSP IP/OBS MODERATE 35: CPT | Performed by: EMERGENCY MEDICINE

## 2018-01-30 PROCEDURE — 82607 VITAMIN B-12: CPT | Performed by: PHYSICIAN ASSISTANT

## 2018-01-30 PROCEDURE — 80048 BASIC METABOLIC PNL TOTAL CA: CPT | Performed by: PHYSICIAN ASSISTANT

## 2018-01-30 PROCEDURE — 93010 ELECTROCARDIOGRAM REPORT: CPT | Performed by: INTERNAL MEDICINE

## 2018-01-30 PROCEDURE — 95951 HB EEG MONITORING/VIDEORECORD: CPT

## 2018-01-30 PROCEDURE — 99291 CRITICAL CARE FIRST HOUR: CPT | Performed by: PHYSICIAN ASSISTANT

## 2018-01-30 PROCEDURE — 85025 COMPLETE CBC W/AUTO DIFF WBC: CPT | Performed by: PHYSICIAN ASSISTANT

## 2018-01-30 PROCEDURE — 80164 ASSAY DIPROPYLACETIC ACD TOT: CPT | Performed by: PHYSICIAN ASSISTANT

## 2018-01-30 PROCEDURE — 95951 PR EEG MONITORING/VIDEORECORD: CPT | Performed by: PSYCHIATRY & NEUROLOGY

## 2018-01-30 PROCEDURE — 93005 ELECTROCARDIOGRAM TRACING: CPT

## 2018-01-30 PROCEDURE — 80164 ASSAY DIPROPYLACETIC ACD TOT: CPT | Performed by: PSYCHIATRY & NEUROLOGY

## 2018-01-30 RX ORDER — QUETIAPINE FUMARATE 25 MG/1
75 TABLET, FILM COATED ORAL
Status: DISCONTINUED | OUTPATIENT
Start: 2018-01-30 | End: 2018-01-31 | Stop reason: HOSPADM

## 2018-01-30 RX ORDER — DIVALPROEX SODIUM 500 MG/1
500 TABLET, DELAYED RELEASE ORAL EVERY 12 HOURS SCHEDULED
Status: DISCONTINUED | OUTPATIENT
Start: 2018-01-30 | End: 2018-01-31

## 2018-01-30 RX ADMIN — LEVOTHYROXINE SODIUM 25 MCG: 25 TABLET ORAL at 05:08

## 2018-01-30 RX ADMIN — HALOPERIDOL LACTATE 2.5 MG: 5 INJECTION, SOLUTION INTRAMUSCULAR at 10:50

## 2018-01-30 RX ADMIN — SODIUM CHLORIDE 1500 MG: 9 INJECTION, SOLUTION INTRAVENOUS at 09:47

## 2018-01-30 RX ADMIN — LEVETIRACETAM 500 MG: 500 TABLET ORAL at 09:10

## 2018-01-30 RX ADMIN — QUETIAPINE FUMARATE 75 MG: 25 TABLET ORAL at 22:01

## 2018-01-30 RX ADMIN — DIVALPROEX SODIUM 500 MG: 500 TABLET, DELAYED RELEASE ORAL at 22:00

## 2018-01-30 RX ADMIN — DOCUSATE SODIUM 100 MG: 100 CAPSULE, LIQUID FILLED ORAL at 09:10

## 2018-01-30 NOTE — SPEECH THERAPY NOTE
Speech Language/Pathology    Speech/Language Pathology Screen    Patient Name: Sanjuanita SANTANA'S Date: 1/30/2018     Chart reviewed; noted pt transferred to ICU yesterday due to change in [address]  Attempted to see pt for on-going dysphagia tx  Pt sleeping and already done with lunch  Per nsg, pt tolerating dysphagia level 2 diet with thin liquids without overt difficulty or s/s aspiration  Takes solids slowly, but effectively  No concerns verbalized  ST will continue to follow for speech and swallowing tx

## 2018-01-30 NOTE — SOCIAL WORK
CM spoke to Lissette Astorga from [facility]  Pt is approved for their acute rehab center  Pt's insurance states that pt has 72 hours to d/c to [facility] as per the auth  If pt can't d/c there before that time, a new auth is needed   Medical Team aware

## 2018-01-30 NOTE — PROGRESS NOTES
Progress Note - ICU Transfer to SD/MS sally Brooks 67 y o  female MRN: [id number]  [address] [facility]   Unit/Bed#: ICU 04 Encounter: [id number]    Code Status: Level 1 - Full Code    Reason for ICU admission: cEEG monitoring    Active problems:   Principal Problem:    Subdural hematoma  Active Problems:    Delirium    Cognitive impairment    Ambulatory dysfunction    Fall    Physical deconditioning    Visual impairment    Urinary tract infection  Resolved Problems:    * No resolved hospital problems  *    Consultants: Neurology, neurosurgery    History of Present Illness: Per Dr Danis Burden "67 y o  female who presents as a transfer from [facility]  She suffered multiple falls this past weekend and was noted to have slurred speech and instability today  She has had multiple falls with subdurals, and was recently discharged home from rehab  Not on any blood thinners or antiplatelets  Unknown LOC "    Summary of clinical course: Patient was transferred out of the ICU initially on 1/27  On 1/29, there was concern amongst primary team and consultants that patient had a change in mental status, namely slurred speech and difficulty with two point discrimination  Neurology was consulted and patient was transferred to the ICU for cEEG monitoring  She was given a Depakote load and started on BID dosing  Patient was on cEEG for 24 hours, and was found to have no seizure activity  She was deemed stable for transfer to the trauma floor on 1/31 after continuous EEG yielded no seizure episodes  Recent or scheduled procedures: cEEG    Outstanding/pending diagnostics: Valproic acid level 1/31 at 1030pm    Cultures: None       Mobilization Plan: PT/OT    Nutrition Plan: Dysphagia mechanical soft, thin liquid    Discharge Plan:   Patient should be ready for discharge to [place] [id number] on 1/31 or 2/1  Spoke with trauma regarding transfer  Please call [phone] with any questions or concerns  Ronnell Severs, MD

## 2018-01-30 NOTE — PROGRESS NOTES
Progress Note - Neurology   Emeli Lino 67 y o  female MRN: [id number]  Unit/Bed#: ICU 04 Encounter: [id number]    Assessment/Plan:   3 67year old female with waxing and waning dysarthria    - Likely secondary to #2, but currently being monitored on vEEG to rule out seizures  - Consider MRI brain pending clinical progression     - Continue with transition to Depakote  - Monitor exam and notify with changes  2  Mixed density SDH s/p craniotomy, with history of L frontotemporal craniotomy in December 2017  - Postoperative management as per neurosurgery team      3  History of lacunar infarction R lentiform nucleus   - Previously on ASA 81 mg QD but currently on hold in setting of SDH  4  Possible seizure disorder   - Currently being monitored on vEEG, no seizures noted overnight     - Depacon to be started today  - Will d/c Keppra today as this may be worsening irritability  5  Recent UTI   - Completed course of Macrobid  Subjective:   Mary Ann Baig is a 67year old female with PMH of possible seizure disorder, 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, R MCA infarction, SAH, SDH who presented to the hospital with slurred speech and ambulatory dysfunction  Per chart review, she was hospitalized in December 2017 after a fall and subsequent SDH which required craniotomy for evacuation and was discharged to rehab but left AMA  She was re-admitted to the hospital with multiple falls and found to have mixed density L SDH  She underwent craniotomy for evacuation of SDH on 1/24/18  Neurology was consulted yesterday after she was noted to have worsening of baseline dysarthria and was agitated and confused  She was placed on vEEG monitoring  Per epileptology, no seizures noted overnight  Per nursing, speech continues to be dysarthric, patient remains agitated  Difficult to assess mental status at this point secondary to agitation   Today, she notes abdominal and breast pain but states it is mostly breast pain  ROS:  Unable to reliably assess secondary to mental status     Medications  Scheduled Meds:  Current Facility-Administered Medications:  acetaminophen 650 mg Oral Q6H PRN Cindy Reyes PA-C   bisacodyl 10 mg Rectal Daily PRN Ziyad Hayes MD   docusate sodium 100 mg Oral BID Renuka Ortiz,    haloperidol lactate 2 5 mg Intravenous Q6H PRN Cindy Reyes PA-C   levETIRAcetam 500 mg Oral Q12H Delia Evans MD   levothyroxine 25 mcg Oral Early Morning Renuka Ortiz,    ondansetron 4 mg Intravenous Q6H PRN Ziyad Hayes MD   QUEtiapine 50 mg Oral HS Cindy Reyes PA-C   valproate sodium 1,500 mg Intravenous Once Bethanie Devarinti, DO   valproate sodium 500 mg Intravenous BID Bethanie Devarinti, DO     Continuous Infusions:   PRN Meds:   acetaminophen    bisacodyl    haloperidol lactate    ondansetron    Vitals: Blood pressure 129/60, pulse 78, temperature 98 2 °F (36 8 °C), resp  rate 15, height 5' 7" (1 702 m), weight 59 2 kg (130 lb 8 2 oz), SpO2 96 %, not currently breastfeeding  ,Body mass index is 20 44 kg/m²  Physical Exam: /60 (BP Location: Left arm)   Pulse 78   Temp 98 2 °F (36 8 °C)   Resp 15   Ht 5' 7" (1 702 m) Comment: per allscripts 8/4/17  Wt 59 2 kg (130 lb 8 2 oz)   LMP  (LMP Unknown)   SpO2 96%   BMI 20 44 kg/m²   General appearance: Alert, oriented to person but unable to state place or time  She has difficulty maintaining attention and concentration  Constantly biting/licking her hands in mitts  Head: EEG leads in place     Eyes: negative findings: lids and lashes normal and conjunctivae and sclerae normal, R cranial nerve III palsy  Lungs: clear to auscultation bilaterally  Heart: regular rate and rhythm  Abdomen: Soft, not distended  Extremities: extremities normal, warm and well-perfused; no cyanosis, clubbing, or edema  Skin: Skin color, texture, turgor normal  No rashes or lesions  Neurologic: Mental status: Alert, oriented to person, unable to state correct location or time  She remains agitated and difficult to examine  Decreased attention and concentration, frequently licking/biting her hands  Cranial nerves: II: pupils R cranial nerve III palsy  , III,VII: ptosis R ptosis noted, VII: upper facial muscle function normal bilaterally, VII: lower facial muscle function normal bilaterally, XII: tongue strength normal  Sensory: Unable to reliably assess secondary to mental status   Motor: Moves all extremities well spontaneously, no focal deficit noted, follows commands with B/L LE, but not B/L UE      Labs  Recent Results (from the past 24 hour(s))   CBC and differential    Collection Time: 01/30/18  5:05 AM   Result Value Ref Range    WBC 9 44 4 31 - 10 16 Thousand/uL    RBC 3 95 3 81 - 5 12 Million/uL    Hemoglobin 12 0 11 5 - 15 4 g/dL    Hematocrit 35 4 34 8 - 46 1 %    MCV 90 82 - 98 fL    MCH 30 4 26 8 - 34 3 pg    MCHC 33 9 31 4 - 37 4 g/dL    RDW 13 7 11 6 - 15 1 %    MPV 9 0 8 9 - 12 7 fL    Platelets 936 (H) 134 - 390 Thousands/uL    nRBC 0 /100 WBCs    Neutrophils Relative 72 43 - 75 %    Lymphocytes Relative 17 14 - 44 %    Monocytes Relative 8 4 - 12 %    Eosinophils Relative 3 0 - 6 %    Basophils Relative 0 0 - 1 %    Neutrophils Absolute 6 77 1 85 - 7 62 Thousands/µL    Lymphocytes Absolute 1 64 0 60 - 4 47 Thousands/µL    Monocytes Absolute 0 73 0 17 - 1 22 Thousand/µL    Eosinophils Absolute 0 24 0 00 - 0 61 Thousand/µL    Basophils Absolute 0 03 0 00 - 0 10 Thousands/µL   Basic metabolic panel    Collection Time: 01/30/18  5:39 AM   Result Value Ref Range    Sodium 139 136 - 145 mmol/L    Potassium 4 0 3 5 - 5 3 mmol/L    Chloride 106 100 - 108 mmol/L    CO2 27 21 - 32 mmol/L    Anion Gap 6 4 - 13 mmol/L    BUN 14 5 - 25 mg/dL    Creatinine 0 48 (L) 0 60 - 1 30 mg/dL    Glucose 92 65 - 140 mg/dL    Calcium 9 0 8 3 - 10 1 mg/dL    eGFR 98 ml/min/1 73sq m   Valproic acid level, total Collection Time: 01/30/18  5:39 AM   Result Value Ref Range    Valproic Acid, Total 4 (L) 50 - 100 ug/mL   Vitamin B12    Collection Time: 01/30/18  6:15 AM   Result Value Ref Range    Vitamin B-12 1,848 (H) 100 - 900 pg/mL     Imaging   Personally reviewed images and reports in PACs  CTH- S/p subdural drain removal  Mixed density fluid and air collection identified left hemisphere compatible with residual subdural collection with 13 mm maximum transverse dimension which is similar to prior, however, no midline shift identified  Local mass effect noted  VTE Prophylaxis: Reason for no pharmacologic prophylaxis SDH    Counseling / Coordination of Care  Total Critical Care time spent 23 minutes excluding procedures, teaching and family updates

## 2018-01-30 NOTE — PROGRESS NOTES
Pt seen by surgical critical care team  VSS  Pt remains agitated, combative and confused  Will follow commands appropriately at times  Remains on EMU  Given Valproic acid loading dose as ordered  Will be transferred out of ICU today

## 2018-01-30 NOTE — PROGRESS NOTES
Progress Note - Critical Care   Homar Gutierrez 67 y o  female MRN: [id number]  Unit/Bed#: ICU 04 Encounter: [id number]    Attending Physician: Emi Dubin, MD      ______________________________________________________________________  Assessment and Plan:   Principal Problem:    Subdural hematoma  Active Problems:    Delirium    Cognitive impairment    Ambulatory dysfunction    Fall    Physical deconditioning    Visual impairment    Urinary tract infection  Resolved Problems:    * No resolved hospital problems  *        Neuro: Possible subclinical seizures - cEEG monitoring X 24hrs  Keppra 500mg q12, depacon dosing per neurology  Q1h neurochecks  Appreciate neurology and nsx recs  -  Agitation - Seroquel 50mg qhs  Haldol 2 5mg q6h PRN (1 doses)  -  [facility] ICU  -  AOX1, GCS 13-14  CV: No active issues  Maintain MAPs > 65  Pulm: Encourage IS   OOB as able  GI: Bowel regimen - colace scheduled  Dulcolax PRN  : UTI - on admission  Complete 5 day course of macrobid  -  No alexis at this time  Monitor I/O's     F/E/N: No IVF  Replete lytes PRN  Continue dysphagia diet  ID: No active issues  Afebrile, no leukocytosis  Heme: No active issues  Endo: Hypothyroidism - Cont  Home synthroid  Msk/Skin: Frequent turns/offloading  OOB as able  -  DVT proph - SCDs, hold chemical proph in setting of SDH  Disposition: Transfer out of ICU  Code Status: Level 1 - Full Code    Counseling / Coordination of Care  Total Critical Care time spent 45 minutes excluding procedures, teaching and family updates  ______________________________________________________________________    Chief Complaint: Confused  "Help me"    24 Hour Events: No events over night       ______________________________________________________________________    Physical Exam   Constitutional: Vital signs are normal  She appears cachectic  She is uncooperative  She appears ill     HENT:   Mouth/Throat: Uvula is midline and oropharynx is clear and moist  Mucous membranes are dry  EEG lead in place  Eyes: Conjunctivae and lids are normal  Pupils are equal, round, and reactive to light  R eye chronically deviates to right  Cardiovascular: Normal rate, regular rhythm, intact distal pulses and normal pulses  Pulses:       Radial pulses are 2+ on the right side, and 2+ on the left side  Dorsalis pedis pulses are 2+ on the right side, and 2+ on the left side  Pulmonary/Chest: Effort normal and breath sounds normal    Abdominal: Soft  Normal appearance and bowel sounds are normal  There is no tenderness  There is no rigidity, no rebound and no guarding  Neurological: She is alert  GCS eye subscore is 4  GCS verbal subscore is 4  GCS motor subscore is 5  Moves all extremities briskly  AOX1          ______________________________________________________________________  Vitals:    18 0300 18 0400 18 0500 18 0600   BP: 120/67 121/60     Pulse: 100 84 86 80   Resp: (!) 26 19 (!) 24 19   Temp:  (!) 97 4 °F (36 3 °C)     TempSrc:  Tympanic     SpO2: 95% 96% 96% 96%   Weight:       Height:           Temperature:   Temp (24hrs), Av °F (36 7 °C), Min:97 4 °F (36 3 °C), Max:98 9 °F (37 2 °C)    Current Temperature: (!) 97 4 °F (36 3 °C)  Weights:   IBW: 61 6 kg    Body mass index is 20 44 kg/m²  Weight (last 2 days)     Date/Time   Weight    18 0535  59 2 (130 51)            Hemodynamic Monitoring:  N/A     Non-Invasive/Invasive Ventilation Settings:  Respiratory    Lab Data (Last 4 hours)    None         O2/Vent Data (Last 4 hours)    None              No results found for: PHART, MGA7WTW, PO2ART, NND4HVV, H5XHADON, BEART, SOURCE  SpO2: SpO2: 96 %  Intake and Outputs:  I/O        07 -  0700  07 -  07    P  O  820 982    Total Intake(mL/kg) 820 (13 9) 982 (16 6)    Urine (mL/kg/hr) 816 (0 6) 325 (0 2)    Total Output 816 325    Net +4 +657 Unmeasured Urine Occurrence 2 x           Nutrition:        Diet Orders            Start     Ordered    01/25/18 1317  Diet Dysphagia/Modified Consistency; Dysphagia 2-Mechanical Soft; Dysphagia 2-Mechanical Soft; Thin Liquid  Diet effective now     Question Answer Comment   Diet Type Dysphagia/Modified Consistency    Dysphagia/Modified Consistency Dysphagia 2-Mechanical Soft    Other Restriction(s): Dysphagia 2-Mechanical Soft    Liquid Modifier Thin Liquid    RD to adjust diet per protocol? No        01/25/18 1316          Labs:     Results from last 7 days  Lab Units 01/30/18  0505 01/28/18  0438 01/27/18  0630 01/26/18  0525   WBC Thousand/uL 9 44 6 88 9 90 9 58   HEMOGLOBIN g/dL 12 0 11 0* 12 1 11 5   HEMATOCRIT % 35 4 32 9* 35 6 34 4*   PLATELETS Thousands/uL 451* 410* 429* 392*   NEUTROS PCT % 72  --  77* 69   MONOS PCT % 8  --  7 9       Results from last 7 days  Lab Units 01/30/18  0539 01/28/18  0438 01/27/18  0630   SODIUM mmol/L 139 140 136   POTASSIUM mmol/L 4 0 4 2 3 4*   CHLORIDE mmol/L 106 108 102   CO2 mmol/L 27 26 28   BUN mg/dL 14 13 10   CREATININE mg/dL 0 48* 0 51* 0 53*   CALCIUM mg/dL 9 0 8 8 8 9   TOTAL PROTEIN g/dL  --  5 8*  --    BILIRUBIN TOTAL mg/dL  --  0 31  --    ALK PHOS U/L  --  79  --    ALT U/L  --  16  --    AST U/L  --  16  --    GLUCOSE RANDOM mg/dL 92 85 90       Results from last 7 days  Lab Units 01/28/18  0438 01/26/18  0525   MAGNESIUM mg/dL 2 2 2 1     Lab Results   Component Value Date    PHOS 4 1 01/28/2018    PHOS 1 6 (L) 01/27/2018    PHOS 2 2 (L) 01/26/2018        Results from last 7 days  Lab Units 01/26/18  0525 01/25/18  0427 01/23/18  1653   INR   --  1 09 1 03   PTT seconds 27 28 24       0  Lab Value Date/Time   TROPONINI <0 02 12/14/2017 1954   TROPONINI 0 02 08/04/2017 1225   TROPONINI 0 16 (H) 08/02/2017 1456         ABG:No results found for: PHART, LFN9PDD, PO2ART, GDB7QPB, O4RLTBYY, BEART, SOURCE  Imaging:  I have personally reviewed pertinent reports  Micro: No results found for: Kishoer Goltz, SPUTUMCULTGERRY  Allergies: Allergies   Allergen Reactions    Acetazolamide      Other reaction(s): Unknown Allergic Reaction    Azithromycin      Other reaction(s): GI Upset    Barium Sulfate      Other reaction(s): GI Upset    Cefazolin     Loperamide      Other reaction(s): Unknown Allergic Reaction    Penicillins      Other reaction(s): Fever    Procaine Other (See Comments)     fever    Sulfa Antibiotics      fever     Medications:   Scheduled Meds:  Current Facility-Administered Medications:  acetaminophen 650 mg Oral Q6H PRN Clare Sweeney PA-C   bisacodyl 10 mg Rectal Daily PRN Aden Antonio MD   docusate sodium 100 mg Oral BID Renuka Ortiz,    haloperidol lactate 2 5 mg Intravenous Q6H PRN Clare Sweeney PA-C   levETIRAcetam 500 mg Oral Q12H Mary Jensen MD   levothyroxine 25 mcg Oral Early Morning Renuka Ortiz,    ondansetron 4 mg Intravenous Q6H PRN Aden Antonio MD   QUEtiapine 50 mg Oral HS Clare Sweeney PA-C   valproate sodium 1,500 mg Intravenous Once Bethanie Devarinti, DO   valproate sodium 500 mg Intravenous BID Bethanie Devarinti, DO     Continuous Infusions:   PRN Meds:    acetaminophen 650 mg Q6H PRN   bisacodyl 10 mg Daily PRN   haloperidol lactate 2 5 mg Q6H PRN   ondansetron 4 mg Q6H PRN     VTE Pharmacologic Prophylaxis: Sequential compression device (Venodyne)   VTE Mechanical Prophylaxis: sequential compression device  Invasive lines and devices: Invasive Devices     Peripheral Intravenous Line            Peripheral IV 01/29/18 Right;Upper Arm 1 day                     Portions of the record may have been created with voice recognition software  Occasional wrong word or "sound a like" substitutions may have occurred due to the inherent limitations of voice recognition software  Read the chart carefully and recognize, using context, where substitutions have occurred      Samra Mulligan MD

## 2018-01-30 NOTE — PHYSICAL THERAPY NOTE
Physical Therapy Cancellation Note- pt very agitated, needing EEG  Will try to see pt later if time allows    Genia Sheffield, PT

## 2018-01-30 NOTE — PROGRESS NOTES
01/30/18 1000   Spiritual Beliefs/Perceptions   Support Systems Spouse/significant other   Coping Responses   Patient Coping Accepting   Plan of Care   Comments Pt  visited nurse said she can be  combative, and confused at times  Provided pastoral presence for pt     Assessment Completed by: Unit visit

## 2018-01-30 NOTE — OCCUPATIONAL THERAPY NOTE
Occupational Therapy Cancellation    Pt transferred to ICU from P9  Currently on EEG  Will continue to follow pt on caseload to see for treatment pending further medical stability      Anna Ffoana MS, OTR/L

## 2018-01-31 ENCOUNTER — APPOINTMENT (INPATIENT)
Dept: RADIOLOGY | Facility: HOSPITAL | Age: 73
DRG: 026 | End: 2018-01-31
Payer: COMMERCIAL

## 2018-01-31 ENCOUNTER — TRANSITIONAL CARE MANAGEMENT (OUTPATIENT)
Dept: FAMILY MEDICINE CLINIC | Facility: HOSPITAL | Age: 73
End: 2018-01-31

## 2018-01-31 VITALS
HEIGHT: 67 IN | RESPIRATION RATE: 18 BRPM | HEART RATE: 95 BPM | BODY MASS INDEX: 20.48 KG/M2 | WEIGHT: 130.51 LBS | DIASTOLIC BLOOD PRESSURE: 57 MMHG | SYSTOLIC BLOOD PRESSURE: 103 MMHG | OXYGEN SATURATION: 100 % | TEMPERATURE: 97.6 F

## 2018-01-31 LAB
ANION GAP SERPL CALCULATED.3IONS-SCNC: 6 MMOL/L (ref 4–13)
BASOPHILS # BLD AUTO: 0.06 THOUSANDS/ΜL (ref 0–0.1)
BASOPHILS NFR BLD AUTO: 1 % (ref 0–1)
BUN SERPL-MCNC: 19 MG/DL (ref 5–25)
CALCIUM SERPL-MCNC: 8.9 MG/DL (ref 8.3–10.1)
CHLORIDE SERPL-SCNC: 107 MMOL/L (ref 100–108)
CO2 SERPL-SCNC: 27 MMOL/L (ref 21–32)
CREAT SERPL-MCNC: 0.52 MG/DL (ref 0.6–1.3)
EOSINOPHIL # BLD AUTO: 0.22 THOUSAND/ΜL (ref 0–0.61)
EOSINOPHIL NFR BLD AUTO: 3 % (ref 0–6)
ERYTHROCYTE [DISTWIDTH] IN BLOOD BY AUTOMATED COUNT: 14.1 % (ref 11.6–15.1)
GFR SERPL CREATININE-BSD FRML MDRD: 95 ML/MIN/1.73SQ M
GLUCOSE SERPL-MCNC: 82 MG/DL (ref 65–140)
HCT VFR BLD AUTO: 35.3 % (ref 34.8–46.1)
HGB BLD-MCNC: 11.8 G/DL (ref 11.5–15.4)
LYMPHOCYTES # BLD AUTO: 1.88 THOUSANDS/ΜL (ref 0.6–4.47)
LYMPHOCYTES NFR BLD AUTO: 26 % (ref 14–44)
MCH RBC QN AUTO: 30.5 PG (ref 26.8–34.3)
MCHC RBC AUTO-ENTMCNC: 33.4 G/DL (ref 31.4–37.4)
MCV RBC AUTO: 91 FL (ref 82–98)
MONOCYTES # BLD AUTO: 0.68 THOUSAND/ΜL (ref 0.17–1.22)
MONOCYTES NFR BLD AUTO: 9 % (ref 4–12)
NEUTROPHILS # BLD AUTO: 4.45 THOUSANDS/ΜL (ref 1.85–7.62)
NEUTS SEG NFR BLD AUTO: 61 % (ref 43–75)
NRBC BLD AUTO-RTO: 0 /100 WBCS
PLATELET # BLD AUTO: 425 THOUSANDS/UL (ref 149–390)
PMV BLD AUTO: 8.9 FL (ref 8.9–12.7)
POTASSIUM SERPL-SCNC: 4.3 MMOL/L (ref 3.5–5.3)
RBC # BLD AUTO: 3.87 MILLION/UL (ref 3.81–5.12)
SODIUM SERPL-SCNC: 140 MMOL/L (ref 136–145)
WBC # BLD AUTO: 7.32 THOUSAND/UL (ref 4.31–10.16)

## 2018-01-31 PROCEDURE — 80048 BASIC METABOLIC PNL TOTAL CA: CPT | Performed by: PHYSICIAN ASSISTANT

## 2018-01-31 PROCEDURE — 85025 COMPLETE CBC W/AUTO DIFF WBC: CPT | Performed by: PHYSICIAN ASSISTANT

## 2018-01-31 PROCEDURE — 99238 HOSP IP/OBS DSCHRG MGMT 30/<: CPT | Performed by: ORTHOPAEDIC SURGERY

## 2018-01-31 PROCEDURE — 99291 CRITICAL CARE FIRST HOUR: CPT | Performed by: PHYSICIAN ASSISTANT

## 2018-01-31 PROCEDURE — 70450 CT HEAD/BRAIN W/O DYE: CPT

## 2018-01-31 RX ORDER — DIVALPROEX SODIUM 250 MG/1
750 TABLET, DELAYED RELEASE ORAL EVERY 12 HOURS SCHEDULED
Qty: 60 TABLET | Refills: 0 | Status: SHIPPED | OUTPATIENT
Start: 2018-01-31

## 2018-01-31 RX ADMIN — LEVOTHYROXINE SODIUM 25 MCG: 25 TABLET ORAL at 04:50

## 2018-01-31 RX ADMIN — DIVALPROEX SODIUM 500 MG: 500 TABLET, DELAYED RELEASE ORAL at 09:07

## 2018-01-31 RX ADMIN — DOCUSATE SODIUM 100 MG: 100 CAPSULE, LIQUID FILLED ORAL at 09:07

## 2018-01-31 NOTE — PROGRESS NOTES
Progress Note - Neurology   Selena Zavala 67 y o  female MRN: [id number]  Unit/Bed#: ICU 04 Encounter: [id number]    Assessment/Plan:  3 67year old female with waxing and waning dysarthria, encephalopathy   - Likely secondary to #2, monitored on vEEG and no seizures noted  Noted to have significant improvement today in terms of dysarthria and encephalopathy     - Continue with Depakote, level noted to be low so will increase to 750 mg BID and check trough level in AM     - Do not feel MRI brain warranted at this time   - Would continue on Seroquel 75 mg BID   - Monitor exam and notify with changes  2  Mixed density SDH s/p craniotomy, with history of L frontotemporal craniotomy in December 2017   - Postoperative management as per neurosurgery team     - Feel patient will benefit from inpatient TBI rehab  Per review of records, [facility] has accepted patient  3  History of lacunar infarction R lentiform nucleus   - Previously on ASA 81 mg QD but currently on hold in setting of SDH  4  Possible seizure disorder   - Was monitored on vEEG and no seizures noted  - Continue on Depakote 750 mg BID  5  Recent UTI   - Completed course of Macrobid  Subjective:   Louisa Ganser is a 67year old female with PMH of possible seizure disorder, 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, R MCA infarction, SAH, SDH who presented to the hospitla with slurred speech and ambulatory dysfunction  Per chart review, she was hospitalized in December 2017 after a fall and subsequent SDH which required craniotomy for evacuation and was discharged to rehab but left AMA  She was re-admitted to the hospital with multiple falls and found to have mixed density L SDH  She underwent craniotomy for evacuation of SDH on 1/24/18  Neurology was consulted after she was noted to have worsening of baseline dysarthria and was agitated and confused  She was placed on vEEG monitoring   Per staff, patient much improved today, more cooperative and alert  Patient complains of feeling cold but otherwise denies complaints  ROS:  History obtained from the patient  General ROS: negative for - fatigue or fever  Ophthalmic ROS: negative for - blurry vision, decreased vision or double vision  Respiratory ROS: negative for - shortness of breath  Cardiovascular ROS: negative for - chest pain  Gastrointestinal ROS: negative for - abdominal pain or nausea/vomiting  Musculoskeletal ROS: positive for - muscular weakness  Neurological ROS: negative for - dizziness, headaches, numbness/tingling, speech problems or visual changes    Medications  Scheduled Meds:  Current Facility-Administered Medications:  acetaminophen 650 mg Oral Q6H PRN Ema Broderick PA-C   bisacodyl 10 mg Rectal Daily PRN John Merrill MD   divalproex sodium 500 mg Oral Q12H [address] Mary Nicole PA-C   docusate sodium 100 mg Oral BID Renuka Ortiz, DO   haloperidol lactate 2 5 mg Intravenous Q6H PRN Ema Broderick PA-C   levothyroxine 25 mcg Oral Early Morning Renuka Ortiz, DO   ondansetron 4 mg Intravenous Q6H PRN John Merrill MD   QUEtiapine 75 mg Oral HS JAYLA [place]     Continuous Infusions:   PRN Meds:   acetaminophen    bisacodyl    haloperidol lactate    ondansetron    Vitals: Blood pressure 115/61, pulse 90, temperature 98 1 °F (36 7 °C), temperature source Oral, resp  rate 21, height 5' 7" (1 702 m), weight 59 2 kg (130 lb 8 2 oz), SpO2 (!) 89 %, not currently breastfeeding  ,Body mass index is 20 44 kg/m²  Physical Exam: /61   Pulse 90   Temp 98 1 °F (36 7 °C) (Oral)   Resp 21   Ht 5' 7" (1 702 m) Comment: per allscripts 8/4/17  Wt 59 2 kg (130 lb 8 2 oz)   LMP  (LMP Unknown)   SpO2 (!) 89%   BMI 20 44 kg/m²   General appearance: Alert, cooperative, oriented to person, place, city  Not able to state correct year     Head: Normocephalic, without obvious abnormality, L sided incision c/d/i with no erythema or drainage  Eyes: negative findings: lids and lashes normal and conjunctivae and sclerae normal, R cranial nerve III palsy  Lungs: clear to auscultation bilaterally  Heart: regular rate and rhythm  Abdomen: soft, non-tender; bowel sounds normal; no masses,  no organomegaly  Extremities: extremities normal, warm and well-perfused; no cyanosis, clubbing, or edema  Skin: Skin color, texture, turgor normal  No rashes or lesions  Neurologic: Mental status: Alert, oriented to person and place, not time     Cranial nerves: II: pupils R cranial nerve III palsy, L pupil 4 mm and briskly reactive  , III,VII: ptosis no ptosis noted, VII: upper facial muscle function normal bilaterally, VII: lower facial muscle function normal bilaterally, XII: tongue strength normal  Sensory: Grossly intact to crude touch  Motor: Motor strength RUE 4/5, RLE 4-/5, LUE and LLE 5/5  Coordination: finger to nose normal bilaterally    Labs  Recent Results (from the past 24 hour(s))   ECG 12 lead    Collection Time: 01/30/18  3:43 PM   Result Value Ref Range    Ventricular Rate 72 BPM    Atrial Rate 72 BPM    IA Interval 158 ms    QRSD Interval 63 ms    QT Interval 392 ms    QTC Interval 429 ms    P [place] 79 degrees    QRS Axis -30 degrees    T Wave Axis 79 degrees   Valproic acid level, total    Collection Time: 01/30/18  8:32 PM   Result Value Ref Range    Valproic Acid, Total 46 (L) 50 - 100 ug/mL   Basic metabolic panel    Collection Time: 01/31/18  5:04 AM   Result Value Ref Range    Sodium 140 136 - 145 mmol/L    Potassium 4 3 3 5 - 5 3 mmol/L    Chloride 107 100 - 108 mmol/L    CO2 27 21 - 32 mmol/L    Anion Gap 6 4 - 13 mmol/L    BUN 19 5 - 25 mg/dL    Creatinine 0 52 (L) 0 60 - 1 30 mg/dL    Glucose 82 65 - 140 mg/dL    Calcium 8 9 8 3 - 10 1 mg/dL    eGFR 95 ml/min/1 73sq m   CBC and differential    Collection Time: 01/31/18  5:04 AM   Result Value Ref Range    WBC 7 32 4 31 - 10 16 Thousand/uL    RBC 3 87 3 81 - 5 12 Million/uL    Hemoglobin 11 8 11 5 - 15 4 g/dL    Hematocrit 35 3 34 8 - 46 1 %    MCV 91 82 - 98 fL    MCH 30 5 26 8 - 34 3 pg    MCHC 33 4 31 4 - 37 4 g/dL    RDW 14 1 11 6 - 15 1 %    MPV 8 9 8 9 - 12 7 fL    Platelets 465 (H) 663 - 390 Thousands/uL    nRBC 0 /100 WBCs    Neutrophils Relative 61 43 - 75 %    Lymphocytes Relative 26 14 - 44 %    Monocytes Relative 9 4 - 12 %    Eosinophils Relative 3 0 - 6 %    Basophils Relative 1 0 - 1 %    Neutrophils Absolute 4 45 1 85 - 7 62 Thousands/µL    Lymphocytes Absolute 1 88 0 60 - 4 47 Thousands/µL    Monocytes Absolute 0 68 0 17 - 1 22 Thousand/µL    Eosinophils Absolute 0 22 0 00 - 0 61 Thousand/µL    Basophils Absolute 0 06 0 00 - 0 10 Thousands/µL     Imaging   No new neuro imaging available for review  VTE Prophylaxis: Reason for no pharmacologic prophylaxis SDH    Counseling / Coordination of Care  Total Critical Care time spent 35 minutes excluding procedures, teaching and family updates

## 2018-01-31 NOTE — SOCIAL WORK
Pt will d/c to [facility] via [facility]  Cm is waiting on final confirmation from [facility] prior to d/c   CM will inform the pt and her

## 2018-01-31 NOTE — PHYSICAL THERAPY NOTE
Physical Therapy Cancellation Note      PT CONSULT RECEIVED  PATIENT TRANSFERRED TO ICU WHERE SHE IS CURRENTLY UNDERGOING EEG MONITORING  PT WILL HOLD SERVICES AT THIS TIME AND CONTINUE TO FOLLOW AS APPROPRIATE      Dania Mae PT

## 2018-01-31 NOTE — DISCHARGE SUMMARY
Discharge Summary -  Trauma   Scout Gutierrez 67 y o  female MRN: [id number]  [facility] Encounter: [id number]    Admission Date: 1/23/2018     Discharge Date: 1/31/2018    Admitting Diagnosis: Subdural hematoma ([facility] [address] ) [I62 00]  Unspecified multiple injuries, initial encounter [T07  XXXA]    Discharge Diagnosis:   Patient Active Problem List   Diagnosis    CVA (cerebral vascular accident) ([facility] [address] )    Weakness of extremity    Hyponatremia    SAH (subarachnoid hemorrhage) ([facility] [address] )    SAH (subarachnoid hemorrhage) ([facility] [address] )    Lacunar infarct, acute (HCC)    Left-sided weakness    Dysarthria    Debility    Subdural hematoma    GERD (gastroesophageal reflux disease)    Delirium    Cognitive impairment    Ambulatory dysfunction    Fall    Physical deconditioning    Visual impairment    Dysphagia    Urinary tract infection         Attending:   Dr Judy Medrano Physician(s):   Neurology (Gerson Vogel)  Neurosurgery Arnel Palacios  Medical- Oncology Erick Akins)   PMR (Tele)    Procedures Performed: No orders of the defined types were placed in this encounter  1/24 OR w/ NSx (left craniotomy for evacuation of subdural hematoma)  1/26 Dc alexis  1/29: 24 hour video EEG    1/23 CTH - Enlarging mixed attenuation L SDH containing chronic, subacute, and acute blood  Increasing effacement of left cerebral sulci and increasing rightward subfalcine shift  Neurosurgical consultation advised  1/23 CTH - L acute on chronic subdural hematoma with 7 mm midline shift  1/25 CT head: Postop changes w/ L craniotomy, evacuation of subdural hemorrhage + subdural drainage catheter placement  No acute intracranial abnormality  EEG--> no seizures noted     Hospital Course:   HPI: Mauricio Bragg is a 67 y o  female who presents as a transfer from [facility]  She suffered multiple falls this past weekend and was noted to have slurred speech and instability today   She has had multiple falls with subdurals, and was recently discharged home from rehab  Not on any blood thinners or antiplatelets  Unknown LOC  Mechanism:Fall    Patient presented himself who is a transfer quicker town  She recently had a subdural hematoma which underwent evacuation in 2017 she subsequently returned to the hospital also having multiple falls  The patient was evaluated and placed in the ICU for evaluation neurosurgery was then consulted as well the patient was subsequently taken to the operating room on January 24th for evacuation of a left subdural hematoma via left craniotomy  Neurology continue to follow the patient as well as Neurosurgery  The patient was also evaluated by Hematology and Oncology for possible bleeding disorder  Hematology-Oncology felt that the patient did not have a bleeding disorder no further workup was indicated she was cleared to undergo surgery at that time  The patient was subsequently observed in the ICU for drain management monitoring  Once the drain was removed however it was noted that the patient did have worsening dysarthria  The patient then underwent an EEG monitoring for 2 days to evaluate for any seizure activity  The patient was on Keppra however this was switched to Depakote by Neurology during her hospital stay  The dose of Depakote was increased prior to her discharge to 750 b i d  secondary to a low trough level  At the time of discharge the patient was still noted to have some dysarthria  She was noted to have some left lower extremity weakness and she also had a right lateral deviation of her eyes  The patient was agreeable to discharge to [facility] TBI rehab  Condition at Discharge: good     Discharge instructions/Information to patient and family:   See after visit summary for information provided to patient and family  Provisions for Follow-Up Care:  See after visit summary for information related to follow-up care and any pertinent home health orders        Disposition: See After Visit Summary for discharge disposition information  Planned Readmission: No    Discharge Statement   I spent 30 minutes discharging the patient  This time was spent on the day of discharge  I had direct contact with the patient on the day of discharge  Additional documentation is required if more than 30 minutes were spent on discharge  Discharge Medications:  See after visit summary for reconciled discharge medications provided to patient and family

## 2018-01-31 NOTE — PROGRESS NOTES
Progress Note - Critical Care   Crystal Kessler 67 y o  female MRN: [id number]  Unit/Bed#: ICU 04 Encounter: [id number]    Assessment: 68 yo female POD 7 s/p left craniotomy with drain palcement for acute on chronic Left SDH  Plan:          Neuro:   GCS x 11 (E 3 V 2 M 6)  Pain: Tylenol 650 mg PO Q6 PRN (x 0 overnight)  Continue Depakote 500 mg PO BID  Will talk with Neurology and adjust dose due to low Valproic Acid level at 46  Continue Seroquel 75 mg PO HS, as it seemed to control her agitation much better  Continue Haldol 2 5 mg Q6 PRN IV (x1 in 24 hours)  Daily CAM-ICU, Q1 neuro checks, Elevate HOB < 30 degrees  Avoid benzos for agitation  Mitts and waist belt for restraint, Take off as soon as alert and re-oriented                 CV:   BP currently 91/47 and pulse 62  MAPs ranging from   If MAPs decrease under 65, will start fluids  Currently no pressor needs  Maintain MAPs > 65 but SBP < 160  Lung:   Currently 95% oxygen saturation on room air  Supplemental oxygen as necessary  Incentive spirometry                 GI:   Continue bowel regimen with Colace and Ducolax PRN                 FEN:   Fluids: +4300 since admission  Electrolytes: Normal  Recheck BMP in AM and replete as necessary  Nutrition: Dysphagia diet ordered  Probably under-nourished and will need to start more supplements  :   3 unmeasured urine occurances overnight  BUN 19 and Creatinine 0 52  No alexis currently                 ID:   Afebrile overnight with max temperature 98 1 degrees  WBC 7 32 from 9 44  Heme:   Hemoglobin 11 8 from 12  Monitor for signs of advanced hemorrhage with neuro checks                 Endo:   Glucose 82 from 92  Levothyroxine 25 mcg PO daily (home dose)                            Msk/Skin:   Monitor for skin breakdown  Pressure off-loading                 Disposition: Can leave today after EEG monitoring finished   24 hours was finished yesterday but Neurology wished to keep her in the unit overnight for extended monitoring  Chief Complaint: Confused and not comprehensible    HPI/24hr events: Patient had a quiet night  She was much less agitated than previous nights and slept most of the night    Physical Exam:   General: Resting comfortably, NAD  Neuro: Sudued behavior, will follow commands and make incomprehensible sounds  Heart: RRR  Lungs: CTA BL  Abdomen: Soft, NTND    Vitals:    18 0300 18 0400 18 0500 18 0600   BP: 116/57 103/57 123/62 (!) 91/47   Pulse: 72 64 70 62   Resp: 16 14 13 14   Temp:  98 1 °F (36 7 °C)     TempSrc:  Oral     SpO2: 93% 94% 95% 95%   Weight:       Height:         Arterial Line BP: 108/54  Arterial Line MAP (mmHg): 78 mmHg    Temperature:   Temp (24hrs), Av 2 °F (36 8 °C), Min:97 4 °F (36 3 °C), Max:98 8 °F (37 1 °C)    Current: Temperature: 98 1 °F (36 7 °C)    Weights:   IBW: 61 6 kg    Body mass index is 20 44 kg/m²  Weight (last 2 days)     None           Non-Invasive/Invasive Ventilation Settings:  Respiratory    Lab Data (Last 4 hours)    None         O2/Vent Data (Last 4 hours)    None              No results found for: PHART, GGF7RXI, PO2ART, NZD9VKD, U6KNRMXW, BEART, SOURCE  SpO2: SpO2: 95 %    Intake and Outputs:  I/O       701 -  0700  07 -  07 07 -  0700    I V  (mL/kg)  1092 1 (17 4)     Total Intake(mL/kg)  1092 1 (17 4)     Urine (mL/kg/hr)  382     Total Output   382      Net   +710 1             Unmeasured Urine Occurrence  4 x         UOP: 3 occurances  Nutrition:        Diet Orders            Start     Ordered    18 1317  Diet Dysphagia/Modified Consistency; Dysphagia 2-Mechanical Soft; Dysphagia 2-Mechanical Soft;  Thin Liquid  Diet effective now     Question Answer Comment   Diet Type Dysphagia/Modified Consistency    Dysphagia/Modified Consistency Dysphagia 2-Mechanical Soft    Other Restriction(s): Dysphagia 2-Mechanical Soft Liquid Modifier Thin Liquid    RD to adjust diet per protocol? No        01/25/18 1316          Labs:     Results from last 7 days  Lab Units 01/31/18  0504 01/30/18  0505 01/28/18  0438 01/27/18  0630   WBC Thousand/uL 7 32 9 44 6 88 9 90   HEMOGLOBIN g/dL 11 8 12 0 11 0* 12 1   HEMATOCRIT % 35 3 35 4 32 9* 35 6   PLATELETS Thousands/uL 425* 451* 410* 429*   NEUTROS PCT % 61 72  --  77*   MONOS PCT % 9 8  --  7        Results from last 7 days  Lab Units 01/31/18  0504 01/30/18  0539 01/28/18  0438   SODIUM mmol/L 140 139 140   POTASSIUM mmol/L 4 3 4 0 4 2   CHLORIDE mmol/L 107 106 108   CO2 mmol/L 27 27 26   BUN mg/dL 19 14 13   CREATININE mg/dL 0 52* 0 48* 0 51*   CALCIUM mg/dL 8 9 9 0 8 8   TOTAL PROTEIN g/dL  --   --  5 8*   BILIRUBIN TOTAL mg/dL  --   --  0 31   ALK PHOS U/L  --   --  79   ALT U/L  --   --  16   AST U/L  --   --  16   GLUCOSE RANDOM mg/dL 82 92 85       Results from last 7 days  Lab Units 01/28/18  0438 01/26/18  0525   MAGNESIUM mg/dL 2 2 2 1       Results from last 7 days  Lab Units 01/28/18  0438 01/27/18  0525 01/26/18  0525   PHOSPHORUS mg/dL 4 1 1 6* 2 2*        Results from last 7 days  Lab Units 01/26/18  0525 01/25/18  0427   INR   --  1 09   PTT seconds 27 28           0  Lab Value Date/Time   TROPONINI <0 02 12/14/2017 1954   TROPONINI 0 02 08/04/2017 1225   TROPONINI 0 16 (H) 08/02/2017 1456       Imaging: No new  EKG: No new    Micro:  No results found for: Shakila Mcbride, WOUNDCULT, SPUTUMCULTUR    Allergies:    Allergies   Allergen Reactions    Acetazolamide      Other reaction(s): Unknown Allergic Reaction    Azithromycin      Other reaction(s): GI Upset    Barium Sulfate      Other reaction(s): GI Upset    Cefazolin     Loperamide      Other reaction(s): Unknown Allergic Reaction    Penicillins      Other reaction(s): Fever    Procaine Other (See Comments)     fever    Sulfa Antibiotics      fever       Medications:   Scheduled Meds:    Current Facility-Administered Medications:  acetaminophen 650 mg Oral Q6H PRN Ricardo Monzon PA-C   bisacodyl 10 mg Rectal Daily PRN Zohra Romero MD   divalproex sodium 500 mg Oral Q12H [address] Mary Nicole PA-C   docusate sodium 100 mg Oral BID Renuka Ortiz, DO   haloperidol lactate 2 5 mg Intravenous Q6H PRN Ricardo Monzon PA-C   levothyroxine 25 mcg Oral Early Morning Renuka Ortiz, DO   ondansetron 4 mg Intravenous Q6H PRN Zohra Romero MD   QUEtiapine 75 mg Oral HS JAYLA Orona     Continuous Infusions:     PRN Meds:    acetaminophen 650 mg Q6H PRN   bisacodyl 10 mg Daily PRN   haloperidol lactate 2 5 mg Q6H PRN   ondansetron 4 mg Q6H PRN       VTE Pharmacologic Prophylaxis: Reason for no pharmacologic prophylaxis SDH  Only mechanical DVT Ppx  VTE Mechanical Prophylaxis: sequential compression device    Invasive lines and devices: Invasive Devices     Peripheral Intravenous Line            Peripheral IV 01/29/18 Right;Upper Arm 2 days                   Code Status: Level 1 - Full Code     Portions of the record may have been created with voice recognition software  Occasional wrong word or "sound a like" substitutions may have occurred due to the inherent limitations of voice recognition software  Read the chart carefully and recognize, using context, where substitutions have occurred       Rey Pop MD

## 2018-02-05 ENCOUNTER — DOCUMENTATION (OUTPATIENT)
Dept: NEUROSURGERY | Facility: CLINIC | Age: 73
End: 2018-02-05

## 2018-02-05 NOTE — Clinical Note
S/p left craniotomy for SDH evac with Dr Sal Parham on 1/24   Follow up for 2 week incision check and 6 week POV

## 2018-02-08 ENCOUNTER — OFFICE VISIT (OUTPATIENT)
Dept: NEUROSURGERY | Facility: CLINIC | Age: 73
End: 2018-02-08

## 2018-02-08 ENCOUNTER — TELEPHONE (OUTPATIENT)
Dept: NEUROLOGY | Facility: CLINIC | Age: 73
End: 2018-02-08

## 2018-02-08 VITALS
TEMPERATURE: 98.2 F | SYSTOLIC BLOOD PRESSURE: 130 MMHG | RESPIRATION RATE: 14 BRPM | DIASTOLIC BLOOD PRESSURE: 70 MMHG | HEIGHT: 67 IN

## 2018-02-08 DIAGNOSIS — S06.5X9A SUBDURAL HEMATOMA (HCC): ICD-10-CM

## 2018-02-08 DIAGNOSIS — Z48.89 AFTERCARE FOLLOWING SURGERY: ICD-10-CM

## 2018-02-08 DIAGNOSIS — Z48.02 VISIT FOR SUTURE REMOVAL: Primary | ICD-10-CM

## 2018-02-08 PROBLEM — R53.1 LEFT-SIDED WEAKNESS: Status: RESOLVED | Noted: 2017-08-05 | Resolved: 2018-02-08

## 2018-02-08 PROCEDURE — 99024 POSTOP FOLLOW-UP VISIT: CPT | Performed by: PHYSICIAN ASSISTANT

## 2018-02-08 RX ORDER — QUETIAPINE FUMARATE 25 MG/1
25 TABLET, FILM COATED ORAL
COMMUNITY
End: 2018-04-19

## 2018-02-08 RX ORDER — SENNA PLUS 8.6 MG/1
1 TABLET ORAL DAILY
COMMUNITY

## 2018-02-08 RX ORDER — LANOLIN ALCOHOL/MO/W.PET/CERES
3 CREAM (GRAM) TOPICAL
COMMUNITY

## 2018-02-08 RX ORDER — METHYLPHENIDATE HYDROCHLORIDE 2.5 MG/1
2.5 TABLET, CHEWABLE ORAL
COMMUNITY
End: 2018-04-19

## 2018-02-08 NOTE — PATIENT INSTRUCTIONS
May shower with gentle shampoo and soap / water  Pat incision dry after showering  Refrain from using hair dryer  No submerging incision until re-evaluated at post-op visit with neurosurgeon  Patient is to refrain from placing oils, ointments, lotions on incision  Refrain from strenuous activity and limit lifting / pulling / pushing to no more then  10 lbs  Encourage participation with PT, OT, and Speech therapy  Recommend patient take fall precautions and refrain from activity that increases chance of fall or injury to head  Do not take any blood thinning medication (no Aspirin, No NSAIDs, No anticoagulants, No antiplatelets)    Follow up as scheduled on  3/8/18 with Dr Nelly Cuello  for continued post-op follow up  Have CT head completed a few days prior to post-op visit with Dr Nelly Cuello  Call our office or present to Emergency room if experience redness, swelling, drainage, gapping/opening of incision or if develop fevers/chills,  increased pain / headache, mental status change, seizure,  nausea, vomiting, speech / vision change, motor or sensory change, or other neurological changes
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
## 2018-02-08 NOTE — PROGRESS NOTES
02/08/2018-PT SEEN TODAY (02/08/2018) FOR 2WK POV APPT AND CONFIRMED 6WK POV (3/08/2018) APPT  CT SCHEDULED AT [facility] ON 3/05/2018  [employer] INSURANCE   NEED TO CHECK FOR [address]
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
## 2018-02-12 ENCOUNTER — TELEPHONE (OUTPATIENT)
Dept: NEUROLOGY | Facility: CLINIC | Age: 73
End: 2018-02-12

## 2018-02-13 ENCOUNTER — TELEPHONE (OUTPATIENT)
Dept: NEUROLOGY | Facility: CLINIC | Age: 73
End: 2018-02-13

## 2018-02-14 NOTE — CASE MANAGEMENT
Notification of Discharge  This is a Notification of Discharge from our facility [address]  Please be advised that this patient has been discharge from our facility  Below you will find the admission and discharge date and time including the patients disposition  PRESENTATION DATE: 1/23/2018  4:26 PM  IP ADMISSION DATE: 1/23/18 1715  DISCHARGE DATE: 1/31/2018  3:21 PM  DISPOSITION: Non [facility]    [address] [facility] in the [facility] by Rubén Teran for 2017  Network Utilization Review Department  Phone: [phone]; Fax [phone]  ATTENTION: The Network Utilization Review Department is now centralized for our 7 Facilities  Make a note that we have a new phone and fax numbers for our Department  Please call with any questions or concerns to [phone] and carefully follow the prompts so that you are directed to the right person  All voicemails are confidential  Fax any determinations, approvals, denials, and requests for initial or continue stay review clinical to [phone]  Due to HIGH CALL volume, it would be easier if you could please send faxed requests to expedite your requests and in part, help us provide discharge notifications faster

## 2018-02-26 NOTE — MISCELLANEOUS
Message   Recorded as Task   Date: 01/09/2018 03:49 PM, Created By: Joy Guzman   Task Name: Call Back   Assigned To: [address] [place] [address]   Regarding Patient: Lesli Villalobos, Status: Active   Comment:    Mei Escobedo - 09 Jan 2018 3:49 PM     TASK CREATED  patient's  Sixto Lyon called, they would like to get skis for her walker so that she can get around easier  He called [employer] who is the supplier of the walker and they are requesting a script from the family physician for the skis  Please fax script to [employer], [phone]  Sixto Lyon asked for a call to let him know this was done  [address] Jan 2018 4:08 PM     TASK REPLIED TO: Previously Assigned To Ishan Sykes - 09 Jan 2018 9:12 PM     TASK REPLIED TO: Previously Assigned To Swapna Rodgers                      I have never written for this for a walker - please clarify I just need to write     Skies for walker  disp 1   use as directed   Thuy Barragan - 10 James 2018 11:14 AM     TASK REPLIED TO: Previously Assigned To [address] [facility]  Correct   Swapna Rodgers - 11 Jan 2018 7:18 AM     TASK REPLIED TO: Previously Assigned To Swapna Rodgers                      order written and put in fax bin   Kimberly Laird - 11 Jan 2018 9:44 AM     TASK REPLIED TO: Previously Assigned To [address] was faxed & LM on [employer] vm stating the order was faxed  Kimberly Laird - 11 Jan 2018 9:44 AM     TASK REASSIGNED: Previously Assigned To Swapna Rodgers        Active Problems    1  Basal ganglia infarction (434 91) (I63 9)   2  History of Brain neoplasm (239 6) (D49 6)   3  Colon cancer screening (V76 51) (Z12 11)   4  COPD (chronic obstructive pulmonary disease) (496) (J44 9)   5  Encounter for screening mammogram for malignant neoplasm of breast (V76 12)   (Z12 31)   6  Esophageal dysmotility (530 5) (K22 4)   7   Exotropia of right eye (378 10) (H50 10)   8  GERD (gastroesophageal reflux disease) (530 81) (K21 9)   9  Medicare annual wellness visit, subsequent (V70 0) (Z00 00)   10  Osteoporosis (733 00) (M81 0)   11  Screening for lipoid disorders (V77 91) (Z13 220)   12  Screening for thyroid disorder (V77 0) (Z13 29)   13  Solitary pulmonary nodule (793 11) (R91 1)   14  Subarachnoid hemorrhage (430) (I60 9)   15  Vitamin D deficiency (268 9) (E55 9)    Current Meds   1  Atorvastatin Calcium 40 MG Oral Tablet; Take 1 tablet daily; Therapy: 11CNO2402 to (Evaluate:49Ttq1419) Recorded   2  Calcium Carbonate-Vitamin D 600-400 MG-UNIT Oral Tablet; TAKE AS DIRECTED; Therapy: 84DWI4219 to Recorded   3  Coenzyme Q10-Levocarnitine  MG Oral Capsule; 1 tab po q day; Therapy: 98YRI8880 to Recorded   4  Lycopene Oral Capsule; 1 tab po q day; Therapy: 13YLK5931 to Recorded   5  Multivitamins Oral Capsule; TAKE 1 CAPSULE DAILY; Therapy: 60LHJ0097 to (Evaluate:54Eej4701) Recorded   6  Phosphatidylserine 100 MG Oral Capsule; 3 TAB DAILY; Therapy: 16EJI9293 to Recorded    Allergies    1  barium sulfate   2  loperamide   3  Penicillins   4  Sulfa Drugs   5  Zithromax TABS   6  Ancef    Signatures   Electronically signed by :  Wiliam Lin, ; Jan 11 2018  9:45AM EST                       (Author)

## 2018-02-26 NOTE — PROGRESS NOTES
02/26/2018-CALLED CHERIE AT PHONE#[phone], SPOKE TO BILL FOR PRECERT ON CT HEAD  IN CLINICAL REVIEW     CHYXAOVQ#[id number]  CLINICALS FAXED TO [facility] TRACKING#[id number] TO FAX#[phone]

## 2018-02-27 NOTE — PROGRESS NOTES
02/27/2018-CALLED CHERIE, SPOKE TO JOHANN, WHO STATES CT APPROVED    GRXP#[id number]  JIDEG 03/05/2018-04/04/2018

## 2018-04-18 ENCOUNTER — TELEPHONE (OUTPATIENT)
Dept: NEUROSURGERY | Facility: CLINIC | Age: 73
End: 2018-04-18

## 2018-04-19 ENCOUNTER — OFFICE VISIT (OUTPATIENT)
Dept: NEUROSURGERY | Facility: CLINIC | Age: 73
End: 2018-04-19

## 2018-04-19 ENCOUNTER — OFFICE VISIT (OUTPATIENT)
Dept: NEUROLOGY | Facility: CLINIC | Age: 73
End: 2018-04-19
Payer: COMMERCIAL

## 2018-04-19 VITALS
WEIGHT: 130 LBS | BODY MASS INDEX: 20.4 KG/M2 | HEIGHT: 67 IN | SYSTOLIC BLOOD PRESSURE: 128 MMHG | TEMPERATURE: 98.7 F | DIASTOLIC BLOOD PRESSURE: 90 MMHG | RESPIRATION RATE: 16 BRPM | HEART RATE: 73 BPM

## 2018-04-19 VITALS — SYSTOLIC BLOOD PRESSURE: 122 MMHG | HEART RATE: 68 BPM | DIASTOLIC BLOOD PRESSURE: 60 MMHG

## 2018-04-19 DIAGNOSIS — Z09 POSTOPERATIVE EXAMINATION: Primary | ICD-10-CM

## 2018-04-19 DIAGNOSIS — I63.81 LACUNAR INFARCTION (HCC): Primary | ICD-10-CM

## 2018-04-19 DIAGNOSIS — S06.5X9A SDH (SUBDURAL HEMATOMA) (HCC): ICD-10-CM

## 2018-04-19 PROCEDURE — 99214 OFFICE O/P EST MOD 30 MIN: CPT | Performed by: PHYSICIAN ASSISTANT

## 2018-04-19 PROCEDURE — 99024 POSTOP FOLLOW-UP VISIT: CPT | Performed by: NEUROLOGICAL SURGERY

## 2018-04-19 RX ORDER — POLYVINYL ALCOHOL 14 MG/ML
1 SOLUTION/ DROPS OPHTHALMIC AS NEEDED
COMMUNITY

## 2018-04-19 RX ORDER — DONEPEZIL HYDROCHLORIDE 10 MG/1
20 TABLET, FILM COATED ORAL
COMMUNITY

## 2018-04-19 RX ORDER — TRAZODONE HYDROCHLORIDE 100 MG/1
50 TABLET ORAL
COMMUNITY

## 2018-04-19 RX ORDER — LIDOCAINE 40 MG/G
CREAM TOPICAL DAILY
COMMUNITY
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
## 2018-04-19 NOTE — ASSESSMENT & PLAN NOTE
Pt is not on Aspirin due to the history of subdural hematoma  A repeat head CT will be done at this time  It is recommended that a statin be started for secondary stroke prevention  Therapy will be continued  She will follow-up in 4 months

## 2018-04-30 ENCOUNTER — HOSPITAL ENCOUNTER (OUTPATIENT)
Dept: CT IMAGING | Facility: HOSPITAL | Age: 73
Discharge: HOME | End: 2018-04-30
Attending: NEUROLOGICAL SURGERY
Payer: COMMERCIAL

## 2018-04-30 DIAGNOSIS — Z09 POSTOPERATIVE EXAMINATION: ICD-10-CM

## 2018-04-30 PROCEDURE — 70450 CT HEAD/BRAIN W/O DYE: CPT

## 2018-05-08 ENCOUNTER — OFFICE VISIT (OUTPATIENT)
Dept: NEUROSURGERY | Facility: CLINIC | Age: 73
End: 2018-05-08

## 2018-05-08 VITALS
DIASTOLIC BLOOD PRESSURE: 62 MMHG | HEIGHT: 67 IN | RESPIRATION RATE: 16 BRPM | BODY MASS INDEX: 20.4 KG/M2 | WEIGHT: 130 LBS | SYSTOLIC BLOOD PRESSURE: 104 MMHG | TEMPERATURE: 97.4 F | HEART RATE: 65 BPM

## 2018-05-08 DIAGNOSIS — Z09 POSTOPERATIVE EXAMINATION: Primary | ICD-10-CM

## 2018-05-08 PROCEDURE — 99024 POSTOP FOLLOW-UP VISIT: CPT | Performed by: NEUROLOGICAL SURGERY

## 2018-05-08 RX ORDER — BIOTIN 1 MG
1000 TABLET ORAL DAILY
COMMUNITY

## 2018-05-08 RX ORDER — SODIUM PHOSPHATE, DIBASIC AND SODIUM PHOSPHATE, MONOBASIC 7; 19 G/133ML; G/133ML
1 ENEMA RECTAL ONCE AS NEEDED
COMMUNITY

## 2018-05-08 RX ORDER — ATORVASTATIN CALCIUM 10 MG/1
10 TABLET, FILM COATED ORAL DAILY
COMMUNITY
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
## 2018-08-06 ENCOUNTER — OFFICE VISIT (OUTPATIENT)
Dept: NEUROLOGY | Facility: CLINIC | Age: 73
End: 2018-08-06
Payer: COMMERCIAL

## 2018-08-06 VITALS — SYSTOLIC BLOOD PRESSURE: 116 MMHG | HEART RATE: 77 BPM | DIASTOLIC BLOOD PRESSURE: 53 MMHG

## 2018-08-06 DIAGNOSIS — R29.898 WEAKNESS OF EXTREMITY: ICD-10-CM

## 2018-08-06 DIAGNOSIS — I63.81 LACUNAR INFARCT, ACUTE (HCC): ICD-10-CM

## 2018-08-06 DIAGNOSIS — S06.5X9A SUBDURAL HEMATOMA (HCC): ICD-10-CM

## 2018-08-06 DIAGNOSIS — I63.9 CEREBROVASCULAR ACCIDENT (CVA), UNSPECIFIED MECHANISM (HCC): Primary | ICD-10-CM

## 2018-08-06 PROCEDURE — 99214 OFFICE O/P EST MOD 30 MIN: CPT | Performed by: PSYCHIATRY & NEUROLOGY
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
## 2019-01-08 ENCOUNTER — TELEPHONE (OUTPATIENT)
Dept: FAMILY MEDICINE CLINIC | Facility: HOSPITAL | Age: 74
End: 2019-01-08

## 2019-01-08 NOTE — TELEPHONE ENCOUNTER
marisol   Rep[facility] is [facility]

## 2019-01-08 NOTE — TELEPHONE ENCOUNTER
Please fax copy of CT lumbar spine from Aug 2017 over to them so they can follow up    Luis Rodriguez

## 2019-05-03 ENCOUNTER — TELEPHONE (OUTPATIENT)
Dept: FAMILY MEDICINE CLINIC | Facility: HOSPITAL | Age: 74
End: 2019-05-03

## 2020-09-05 NOTE — CONSULTS
Consultation - Neurosurgery   Naty Mars 67 y o  female MRN: [id number]  Unit/Bed#: ICU 09 Encounter: [id number]  Consult completed on 1/24/18 at [facility]      Inpatient consult to Neurosurgery  Consult performed by: Emily Swan ordered by: Sy Parry    Inpatient consult to Neurosurgery  Consult performed by: Emily Swan ordered by: [facility]          Assessment/Plan     Assessment:  1  Mixed left convexity subdural hemorrhage with midline shift and effacement  2  Gait dysfunction  3  Seizure disorder  4  Hx of left frontotemporal craniotomy for subdural hematoma evacuation 12/2017  5  Hx of lacunar infarct    Plan:  · Exam: Resting comfortably, easily aroused by voice, oriented x self, location, month  Disoriented to day and year  Chronic right facial weakness with right eye lateralized, does not cross midline  +Right inward and downward drift  Dysmetria right finger to nose  3/3 immediate object recognition  Speech is fluent, slightly slurred but understandable  · Imaging: personally reviewed and reviewed with attending  · CT head wo: enlarging mixed attenuation subdural hemorrhage on the left containing chronic, subacute and acute blood  Increasing effacement of left cerebral sulci and increasing rightward subfalcine shift  · STAT CT head if decline in GCS >2 pts  · Hgb 12 1  · WBC 7 5, Platelets 043  · INR 1 03  · UA showed +small Leukocytes, innumerable bacteria  She was started on Macrobid for UTI  · DVT ppx: SCDs, hold AC/AP  · Pain control: patient denies any pain at this time  · Started on home dose of Keppra 500mg BID  · Continue Q1 Neuro checks  · Will discuss with neurosurgical team in regards to surgical intervention today vs tomorrow   Call with questions or concerns      History of Present Illness   HPI: Naty Mars is a 67y o  year old female with PMH including left subdural hematoma, seizure disorder, chronic sinusitis, gait dysfunction, lacunar infarct  Patient was evaluated by the Neurosurgery back in December, 2017 for an acute on chronic subdural hematoma she underwent a craniotomy for evacuation  She presented as transfer from [facility]  It is reported she has suffered from multiple falls within the past weekend and noted having slurred speech and instability  Patient mentions falling backwards but unable to state when  She denies change in gait or speech  She states "why is this happening to me"      Review of Systems   Constitutional: Negative for activity change  HENT: Negative for tinnitus and trouble swallowing  Eyes: Negative for photophobia and visual disturbance  Respiratory: Negative for shortness of breath  Cardiovascular: Negative for chest pain  Gastrointestinal: Negative for abdominal pain, constipation, diarrhea, nausea and vomiting  Genitourinary: Negative for dysuria, frequency and urgency  Musculoskeletal: Negative for back pain and neck pain  Skin: Negative for rash and wound  Allergic/Immunologic: Negative for environmental allergies and food allergies  Neurological: Positive for weakness  Negative for dizziness, numbness and headaches  Hematological: Does not bruise/bleed easily  Psychiatric/Behavioral: The patient is not nervous/anxious          Historical Information   Past Medical History:   Diagnosis Date    Chronic sinusitis     GERD (gastroesophageal reflux disease)     Hypotension     Seizures (HCC)      Past Surgical History:   Procedure Laterality Date    ABDOMINAL SURGERY      BRAIN SURGERY      CLOSED REDUCTION DISTAL FEMUR FRACTURE      CRANIOTOMY      CRANIOTOMY Left 12/15/2017    Procedure: Left fronto-temporal craniotomy for evacuation of subdural hematoma and membrane stripping;  Surgeon: Ludy Kaur MD;  Location: [facility];  Service: Neurosurgery    HYSTERECTOMY      HYSTERECTOMY      LUNG LOBECTOMY Right     OOPHORECTOMY      SPLENECTOMY       History   Alcohol Use No     History   Drug Use No     History   Smoking Status    Current Every Day Smoker    Packs/day: 1 00    Types: Cigarettes   Smokeless Tobacco    Never Used     Family History   Problem Relation Age of Onset    Heart disease Mother     Diverticulitis Father        Meds/Allergies   all current active meds have been reviewed and current meds:   Current Facility-Administered Medications   Medication Dose Route Frequency    acetaminophen (TYLENOL) tablet 650 mg  650 mg Oral Q6H PRN    bisacodyl (DULCOLAX) rectal suppository 10 mg  10 mg Rectal Daily PRN    chlorhexidine (PERIDEX) 0 12 % oral rinse 15 mL  15 mL Swish & Spit Q12H [address]    docusate sodium (COLACE) capsule 100 mg  100 mg Oral BID    levothyroxine tablet 25 mcg  25 mcg Oral Early Morning    nitrofurantoin (MACROBID) extended-release capsule 100 mg  100 mg Oral BID    sodium chloride 0 9 % infusion  100 mL/hr Intravenous Continuous     Allergies   Allergen Reactions    Acetazolamide      Other reaction(s): Unknown Allergic Reaction    Azithromycin      Other reaction(s): GI Upset    Barium Sulfate      Other reaction(s): GI Upset    Cefazolin     Loperamide      Other reaction(s): Unknown Allergic Reaction    Penicillins      Other reaction(s): Fever    Procaine Other (See Comments)     fever    Sulfa Antibiotics      fever       Objective   I/O       01/22 0701 - 01/23 0700 01/23 0701 - 01/24 0700 01/24 0701 - 01/25 0700    I V  (mL/kg)  1092 1 (17 4)     Total Intake(mL/kg)  1092 1 (17 4)     Urine (mL/kg/hr)  382     Total Output   382      Net   +710 1             Unmeasured Urine Occurrence  4 x           Physical Exam   Constitutional: She is oriented to person, place, and time  She appears well-developed and well-nourished  She is sleeping  Patient easily aroused with voice   HENT:   Head: Normocephalic and atraumatic  Eyes: Pupils are equal, round, and reactive to light    +dysconjugate gaze   Right eye with chronic lateral gaze, unable to cross midline   Cardiovascular: Normal rate  Pulmonary/Chest: Effort normal  No respiratory distress  Abdominal: Soft  There is no tenderness  There is no rebound  Neurological: She is oriented to person, place, and time  She has an abnormal Finger-Nose-Finger Test (right dysmetria)  Reflex Scores:       Bicep reflexes are 1+ on the right side and 1+ on the left side  Brachioradialis reflexes are 1+ on the right side and 1+ on the left side  Patellar reflexes are 2+ on the right side and 2+ on the left side  Achilles reflexes are 2+ on the right side and 2+ on the left side  Skin: Skin is warm  Psychiatric: Thought content normal      Neurologic Exam     Mental Status   Oriented to person, place, and time  Registration: recalls 3 of 3 objects  Recall of objects at 5 minutes: recalls 0 of 3 objects  Attention: decreased  Concentration: decreased  Speech: (Speech is slightly slurred but fluent and understandable)  Level of consciousness: drowsy  Able to name object  Able to repeat  Normal comprehension  Cranial Nerves     CN III, IV, VI   Pupils are equal, round, and reactive to light  Right pupil: Size: 4 mm  Shape: regular  Reactivity: brisk  Left pupil: Size: 4 mm  Shape: regular  Reactivity: brisk  Nystagmus: none   Ophthalmoparesis: right eye chronic lateral gaze, unable to cross midline  CN V   Facial sensation intact  CN VII   Right facial weakness: baseline right facial weakness      CN XII   Tongue deviation: none    Motor Exam   Muscle bulk: decreased  Right arm pronator drift: present (inward/downward rotation)  Left arm pronator drift: absent  RUE:  Shoulder shru/5  Deltoid: 2/5  Biceps: 3+-4-/5  Triceps: 3/5  : 3-4/5    LUE:  Shoulder shru/5  Deltoid: 4/5  Biceps: 4/5  Triceps: 3+-4/5  : 4/5    BLE:  HF/HE: 4/5  KF/KE: 5/5  DF/PF 5/5     Sensory Exam   Light touch normal    Proprioception abnormal, incorrect JSP    DSS intact      Gait, Coordination, and Reflexes     Coordination   Finger to nose coordination: abnormal (right dysmetria)    Tremor   Resting tremor: absent    Reflexes   Right brachioradialis: 1+  Left brachioradialis: 1+  Right biceps: 1+  Left biceps: 1+  Right patellar: 2+  Left patellar: 2+  Right achilles: 2+  Left achilles: 2+  Right Rudd: absent  Left Rudd: absent  Right ankle clonus: absent  Left ankle clonus: absent      Vitals:Blood pressure 124/53, pulse 60, temperature 98 1 °F (36 7 °C), temperature source Oral, resp  rate 12, weight 62 6 kg (137 lb 15 8 oz), SpO2 97 %, not currently breastfeeding  ,Body mass index is 21 61 kg/m²  Lab Results:     Results from last 7 days  Lab Units 01/24/18  0541 01/23/18  1326   WBC Thousand/uL 7 56 7 37   HEMOGLOBIN g/dL 12 1 12 8   HEMATOCRIT % 34 7* 38 7   PLATELETS Thousands/uL 363 426*   NEUTROS PCT % 61 73   MONOS PCT % 8 7       Results from last 7 days  Lab Units 01/24/18  0541 01/23/18  1326   SODIUM mmol/L 137 132*   POTASSIUM mmol/L 3 5 4 1   CHLORIDE mmol/L 106 99*   CO2 mmol/L 25 25   BUN mg/dL 7 11   CREATININE mg/dL 0 43* 0 64   CALCIUM mg/dL 8 5 8 9   GLUCOSE RANDOM mg/dL 79 100               Results from last 7 days  Lab Units 01/23/18  1653   INR  1 03   PTT seconds 24     No results found for: TROPONINT  ABG:No results found for: PHART, VLU3YQV, PO2ART, KDZ6IDD, R4QKFCVK, BEART, SOURCE    Imaging Studies: I have personally reviewed pertinent reports  and I have personally reviewed pertinent films in PACS    Ct Head Without Contrast    Result Date: 1/23/2018  Narrative: CT BRAIN - WITHOUT CONTRAST INDICATION:  Slurred speech  Weakness  History of subarachnoid hemorrhage and subdural hygroma  COMPARISON:  December 17, 2017 TECHNIQUE:  CT examination of the brain was performed  In addition to axial images, coronal reformatted images were created and submitted for interpretation    Radiation dose length product (DLP) for this visit:  0431 19 97 94 mGy-cm   This examination, like all CT scans performed in the [facility], was performed utilizing techniques to minimize radiation dose exposure, including the use of iterative reconstruction and automated exposure control  IMAGE QUALITY:  Diagnostic  FINDINGS:  PARENCHYMA:  Pre-existing central atrophy and chronic microvascular white matter ischemic changes  Old lacunar infarcts in the basal ganglia bilaterally  Similar cerebellar atrophy  Enlarging mixed attenuation subdural hemorrhage on the left contributing to rightward subfalcine shift of up to 7 mm, previously 6 mm  Increased effacement of cerebral sulci in the left hemisphere, most pronounced at the vertex  Increased effacement of left lateral ventricle, especially atrium of left lateral ventricle with symmetric appearing left temporal horn, currently there is no ventricular entrapment  No obvious uncal herniation or tonsillar herniation  VENTRICLES AND EXTRA-AXIAL SPACES:  There continues to be a large left-sided subdural collection, currently up to 2 2 cm in depth, previously 1 8 cm  Attenuation measurements range from simple fluid to mildly complex fluid to high attenuation blood, especially at the caudal aspect of the collection/posterior parietal/occipital subdural space  There are also high attenuation areas adjacent to the left frontal lobe on image 2/19, adjacent to left occipital lobe image 2/19, and layering along the left  tentorial leaflet as seen previously  VISUALIZED ORBITS AND PARANASAL SINUSES:  Mild sphenoid sinus disease, similar prior exam   Partial right mastoiditis CALVARIUM AND EXTRACRANIAL SOFT TISSUES:  Postop changes left calvarium  Impression: Enlarging mixed attenuation subdural hemorrhage on the left containing chronic, subacute, and acute blood  Increasing effacement of left cerebral sulci (most pronounced at the vertex) and increasing rightward subfalcine shift    Neurosurgical consultation advised  I personally discussed this study with Arash Cassidy on 1/23/2018 2:28 PM  Workstation performed: [id number]       EKG, Pathology, and Other Studies: I have personally reviewed pertinent reports  and I have personally reviewed pertinent films in PACS    VTE Prophylaxis: Sequential compression device (Venodyne)  and Reason for no pharmacologic prophylaxis large left subdural hematoma    Code Status: Level 1 - Full Code  Advance Directive and Living Will:      Power of :    POLST:      Counseling / Coordination of Care  I spent 30 minutes with the patient  No

## 2021-01-17 NOTE — ANESTHESIA POSTPROCEDURE EVALUATION
Post-Op Assessment Note      CV Status:  Stable    Mental Status:  Awake    Hydration Status:  Stable    PONV Controlled:  None    Airway Patency:  Patent    Post Op Vitals Reviewed: Yes          Staff: Anesthesiologist, CRNA           BP (!) 102/47 (yennifer 148/61) (01/24/18 1946)    Temp (!) 97 °F (36 1 °C) (01/24/18 1946)    Pulse 73 (01/24/18 1946)   Resp 17 (01/24/18 1946)    SpO2 100 % (01/24/18 1946) Face Mask

## 2024-09-11 NOTE — PLAN OF CARE
Problem: OCCUPATIONAL THERAPY ADULT  Goal: Performs self-care activities at highest level of function for planned discharge setting  See evaluation for individualized goals  Treatment Interventions: ADL retraining, Visual perceptual retraining, Functional transfer training, UE strengthening/ROM, Endurance training, Cognitive reorientation, Patient/family training, Equipment evaluation/education, Neuromuscular reeducation, Fine motor coordination activities, Compensatory technique education, Continued evaluation, Energy conservation, Activityengagement          See flowsheet documentation for full assessment, interventions and recommendations  Outcome: Progressing  Limitation: Decreased ADL status, Decreased UE strength, Decreased Safe judgement during ADL, Decreased cognition, Decreased endurance, Decreased high-level ADLs, Decreased fine motor control, Decreased self-care trans, Visual deficit (BALANCE, MEDICAL STATUS)  Prognosis: Fair  Assessment: pt participated in am ot session and was seen focusing on groomnig of hair, bedm obility, functional moblty and trasnfers, lb drssing seated eob, orinetatino, cogntiive tasks  waist restraints and wrist restraints were removed and pt was left oob in bedside chair on chair alarm with call bell with nsg approval  pt requires mod asst x 1 to walk moderate distances with rw  pt with lue and  le weakness which limits independence  pt with poor awareness of same  pt requried max ast to place [facility] to decreased coordination l ue  pt was plesant and cooperative  pt is grossly orieted to month and fact that she is in [facility], spring for season, 2001 for year  poor recall of info 5 min later  pt was able to sequence playing cards in number order with min vc's  pt was cooperative and talaktive thsi session  difficlt to understand at times      [address] [place], [address]  OT Discharge Recommendation: Short Term Rehab  OT - OK to Discharge:  (TO STR AT THIS TIME) Detail Level: Zone Render In Strict Bullet Format?: No Continue Regimen: Current treatment (prescribed by PCP)

## (undated) DEVICE — JACKSON-PRATT 100CC BULB RESERVOIR: Brand: CARDINAL HEALTH

## (undated) DEVICE — ADHESIVE SKN CLSR HISTOACRYL FLEX 0.5ML LF

## (undated) DEVICE — TIBURON SPLIT SHEET: Brand: CONVERTORS

## (undated) DEVICE — TRAY FOLEY 16FR URIMETER SURESTEP

## (undated) DEVICE — INTENDED FOR TISSUE SEPARATION, AND OTHER PROCEDURES THAT REQUIRE A SHARP SURGICAL BLADE TO PUNCTURE OR CUT.: Brand: BARD-PARKER ® CARBON RIB-BACK BLADES

## (undated) DEVICE — REM POLYHESIVE ADULT PATIENT RETURN ELECTRODE: Brand: VALLEYLAB

## (undated) DEVICE — SPONGE PVP SCRUB WING STERILE

## (undated) DEVICE — PREP SURGICAL PURPREP 26ML

## (undated) DEVICE — PACK CRANIOTOMY PBDS RF

## (undated) DEVICE — RANEY SCALP CLIP STERILE: Brand: AESCULAP

## (undated) DEVICE — GLOVE SRG BIOGEL 8

## (undated) DEVICE — Device: Brand: IQ SYSTEM

## (undated) DEVICE — TOOL 9MH30 LEGEND 9CM 3MM MH: Brand: MIDAS REX

## (undated) DEVICE — LIGHT HANDLE COVER SLEEVE DISP BLUE STELLAR

## (undated) DEVICE — MONITORR™ ICP EXTERNAL CSF DRAINAGE AND MONITORING SYSTEM: Brand: MONITORR™

## (undated) DEVICE — 3M™ STERI-STRIP™ REINFORCED ADHESIVE SKIN CLOSURES, R1547, 1/2 IN X 4 IN (12 MM X 100 MM), 6 STRIPS/ENVELOPE: Brand: 3M™ STERI-STRIP™

## (undated) DEVICE — NEURO PATTIES 1/4 X 1/4

## (undated) DEVICE — 3M™ IOBAN™ 2 ANTIMICROBIAL INCISE DRAPE 6650EZ: Brand: IOBAN™ 2

## (undated) DEVICE — SUT MONOCRYL 4-0 PS-2 27 IN Y426H

## (undated) DEVICE — SUT NUROLON 4-0 TF CR/8 18 IN C584D

## (undated) DEVICE — FLOSEAL MATRIX IS INDICATED IN SURGICAL PROCEDURES (OTHER THAN IN OPHTHALMIC) AS AN ADJUNCT TO HEMOSTASIS WHEN CONTROL OF BLEEDING BY LIGATURE OR CONVENTIONAL PROCEDURES IS INEFFECTIVE OR IMPRACTICAL.: Brand: FLOSEAL HEMOSTATIC MATRIX

## (undated) DEVICE — SURGIFOAM 8.5 X 12.5

## (undated) DEVICE — CATH 46118 EDM VENT 35CM W/TROCAR

## (undated) DEVICE — TOOL F2/8TA23 LEGEND 8CM 2.3MM TAPER: Brand: MIDAS REX™

## (undated) DEVICE — IV CATH INTROCAN 18G X 1 1/4 SAFETY

## (undated) DEVICE — DRAPE INTESTINAL ISOLATION BAG

## (undated) DEVICE — DRAPE EQUIPMENT RF WAND

## (undated) DEVICE — INTENDED FOR TISSUE SEPARATION, AND OTHER PROCEDURES THAT REQUIRE A SHARP SURGICAL BLADE TO PUNCTURE OR CUT.: Brand: BARD-PARKER SAFETY BLADES SIZE 15, STERILE

## (undated) DEVICE — DRAIN JACKSON PRATT 10FR 1/8END: Brand: CARDINAL HEALTH

## (undated) DEVICE — ANTIBACTERIAL VIOLET BRAIDED (POLYGLACTIN 910), SYNTHETIC ABSORBABLE SUTURE: Brand: COATED VICRYL

## (undated) DEVICE — SUT MONOCRYL 2-0 SH 27 IN Y417H

## (undated) DEVICE — SURGICEL 4 X 8

## (undated) DEVICE — BIPOLAR IRRIGATOR INTEGRATED TUBING AND BIPOLAR CORD SET, DISPOSABLE

## (undated) DEVICE — PROXIMATE PLUS MD MULTI-DIRECTIONAL RELEASE SKIN STAPLERS CONTAINS 35 STAINLESS STEEL STAPLES APPROXIMATE CLOSED DIMENSIONS: 6.9MM X 3.9MM WIDE: Brand: PROXIMATE

## (undated) DEVICE — SUT MONOCRYL 4-0 PS-2 18 IN Y496G

## (undated) DEVICE — GLOVE INDICATOR PI UNDERGLOVE SZ 8.5 BLUE

## (undated) DEVICE — GELFOAM 100